# Patient Record
Sex: MALE | Race: WHITE | NOT HISPANIC OR LATINO
[De-identification: names, ages, dates, MRNs, and addresses within clinical notes are randomized per-mention and may not be internally consistent; named-entity substitution may affect disease eponyms.]

---

## 2017-06-02 ENCOUNTER — APPOINTMENT (OUTPATIENT)
Dept: GASTROENTEROLOGY | Facility: HOSPITAL | Age: 67
End: 2017-06-02

## 2017-06-02 ENCOUNTER — OUTPATIENT (OUTPATIENT)
Dept: OUTPATIENT SERVICES | Facility: HOSPITAL | Age: 67
LOS: 1 days | Discharge: HOME | End: 2017-06-02
Payer: MEDICARE

## 2017-06-02 ENCOUNTER — CHART COPY (OUTPATIENT)
Age: 67
End: 2017-06-02

## 2017-06-02 DIAGNOSIS — K31.89 OTHER DISEASES OF STOMACH AND DUODENUM: ICD-10-CM

## 2017-06-02 PROCEDURE — 43253 EGD US TRANSMURAL INJXN/MARK: CPT | Mod: 59,GC

## 2017-06-02 PROCEDURE — 43259 EGD US EXAM DUODENUM/JEJUNUM: CPT | Mod: GC

## 2017-06-05 ENCOUNTER — APPOINTMENT (OUTPATIENT)
Dept: SURGICAL ONCOLOGY | Facility: CLINIC | Age: 67
End: 2017-06-05

## 2017-06-05 VITALS
DIASTOLIC BLOOD PRESSURE: 82 MMHG | WEIGHT: 200 LBS | OXYGEN SATURATION: 99 % | HEIGHT: 66 IN | BODY MASS INDEX: 32.14 KG/M2 | SYSTOLIC BLOOD PRESSURE: 148 MMHG | HEART RATE: 117 BPM

## 2017-06-05 DIAGNOSIS — Z87.19 PERSONAL HISTORY OF OTHER DISEASES OF THE DIGESTIVE SYSTEM: ICD-10-CM

## 2017-06-05 DIAGNOSIS — Z86.79 PERSONAL HISTORY OF OTHER DISEASES OF THE CIRCULATORY SYSTEM: ICD-10-CM

## 2017-06-05 DIAGNOSIS — Z87.891 PERSONAL HISTORY OF NICOTINE DEPENDENCE: ICD-10-CM

## 2017-06-05 DIAGNOSIS — Z86.39 PERSONAL HISTORY OF OTHER ENDOCRINE, NUTRITIONAL AND METABOLIC DISEASE: ICD-10-CM

## 2017-06-05 DIAGNOSIS — Z87.09 PERSONAL HISTORY OF OTHER DISEASES OF THE RESPIRATORY SYSTEM: ICD-10-CM

## 2017-06-05 RX ORDER — WARFARIN SODIUM 5 MG/1
5 TABLET ORAL
Refills: 0 | Status: ACTIVE | COMMUNITY

## 2017-06-05 RX ORDER — REPAGLINIDE 1 MG/1
1 TABLET ORAL
Refills: 0 | Status: ACTIVE | COMMUNITY

## 2017-06-05 RX ORDER — METFORMIN ER 500 MG 500 MG/1
500 TABLET ORAL
Refills: 0 | Status: ACTIVE | COMMUNITY

## 2017-06-05 RX ORDER — PANCRELIPASE 120000; 24000; 76000 [USP'U]/1; [USP'U]/1; [USP'U]/1
24000-76000 CAPSULE, DELAYED RELEASE PELLETS ORAL
Refills: 0 | Status: ACTIVE | COMMUNITY

## 2017-06-05 RX ORDER — TIOTROPIUM BROMIDE 18 UG/1
18 CAPSULE ORAL; RESPIRATORY (INHALATION)
Refills: 0 | Status: ACTIVE | COMMUNITY

## 2017-06-05 RX ORDER — BUDESONIDE AND FORMOTEROL FUMARATE DIHYDRATE 160; 4.5 UG/1; UG/1
160-4.5 AEROSOL RESPIRATORY (INHALATION)
Refills: 0 | Status: ACTIVE | COMMUNITY

## 2017-06-05 RX ORDER — METOPROLOL SUCCINATE 100 MG/1
100 TABLET, EXTENDED RELEASE ORAL
Refills: 0 | Status: ACTIVE | COMMUNITY

## 2017-06-05 RX ORDER — BENAZEPRIL HYDROCHLORIDE 10 MG/1
10 TABLET, FILM COATED ORAL
Refills: 0 | Status: ACTIVE | COMMUNITY

## 2017-06-05 RX ORDER — DEXLANSOPRAZOLE 60 MG/1
60 CAPSULE, DELAYED RELEASE ORAL
Refills: 0 | Status: ACTIVE | COMMUNITY

## 2017-06-05 RX ORDER — SITAGLIPTIN 100 MG/1
100 TABLET, FILM COATED ORAL
Refills: 0 | Status: ACTIVE | COMMUNITY

## 2017-06-05 RX ORDER — ATORVASTATIN CALCIUM 20 MG/1
20 TABLET, FILM COATED ORAL
Refills: 0 | Status: ACTIVE | COMMUNITY

## 2017-06-05 RX ORDER — DILTIAZEM HYDROCHLORIDE 120 MG/1
120 TABLET ORAL
Refills: 0 | Status: ACTIVE | COMMUNITY

## 2017-06-05 RX ORDER — MEMANTINE HYDROCHLORIDE 10 MG/1
10 TABLET ORAL
Refills: 0 | Status: ACTIVE | COMMUNITY

## 2017-06-14 ENCOUNTER — OUTPATIENT (OUTPATIENT)
Dept: OUTPATIENT SERVICES | Facility: HOSPITAL | Age: 67
LOS: 1 days | Discharge: HOME | End: 2017-06-14

## 2017-06-14 DIAGNOSIS — C16.9 MALIGNANT NEOPLASM OF STOMACH, UNSPECIFIED: ICD-10-CM

## 2017-06-15 ENCOUNTER — APPOINTMENT (OUTPATIENT)
Dept: HEMATOLOGY ONCOLOGY | Facility: CLINIC | Age: 67
End: 2017-06-15

## 2017-06-15 VITALS
DIASTOLIC BLOOD PRESSURE: 71 MMHG | BODY MASS INDEX: 30.44 KG/M2 | RESPIRATION RATE: 16 BRPM | HEIGHT: 67.95 IN | HEART RATE: 92 BPM | OXYGEN SATURATION: 100 % | TEMPERATURE: 98.7 F | WEIGHT: 200.84 LBS | SYSTOLIC BLOOD PRESSURE: 106 MMHG

## 2017-06-21 ENCOUNTER — APPOINTMENT (OUTPATIENT)
Dept: INFUSION THERAPY | Facility: HOSPITAL | Age: 67
End: 2017-06-21

## 2017-06-28 ENCOUNTER — RESULT REVIEW (OUTPATIENT)
Age: 67
End: 2017-06-28

## 2017-06-28 ENCOUNTER — OUTPATIENT (OUTPATIENT)
Dept: OUTPATIENT SERVICES | Facility: HOSPITAL | Age: 67
LOS: 1 days | End: 2017-06-28
Payer: SELF-PAY

## 2017-06-28 ENCOUNTER — APPOINTMENT (OUTPATIENT)
Dept: INFUSION THERAPY | Facility: HOSPITAL | Age: 67
End: 2017-06-28

## 2017-06-28 DIAGNOSIS — K63.5 POLYP OF COLON: ICD-10-CM

## 2017-06-28 PROCEDURE — 88321 CONSLTJ&REPRT SLD PREP ELSWR: CPT

## 2017-06-30 ENCOUNTER — APPOINTMENT (OUTPATIENT)
Dept: INFUSION THERAPY | Facility: HOSPITAL | Age: 67
End: 2017-06-30

## 2017-06-30 LAB — SURGICAL PATHOLOGY STUDY: SIGNIFICANT CHANGE UP

## 2017-07-01 ENCOUNTER — EMERGENCY (EMERGENCY)
Facility: HOSPITAL | Age: 67
LOS: 1 days | Discharge: HOME | End: 2017-07-01
Attending: EMERGENCY MEDICINE | Admitting: EMERGENCY MEDICINE
Payer: MEDICARE

## 2017-07-01 VITALS
TEMPERATURE: 98 F | SYSTOLIC BLOOD PRESSURE: 104 MMHG | RESPIRATION RATE: 20 BRPM | DIASTOLIC BLOOD PRESSURE: 73 MMHG | HEART RATE: 114 BPM | OXYGEN SATURATION: 100 %

## 2017-07-01 VITALS
SYSTOLIC BLOOD PRESSURE: 101 MMHG | DIASTOLIC BLOOD PRESSURE: 55 MMHG | HEART RATE: 82 BPM | RESPIRATION RATE: 20 BRPM | TEMPERATURE: 98 F | OXYGEN SATURATION: 100 %

## 2017-07-01 LAB
ALBUMIN SERPL ELPH-MCNC: 3.7 G/DL — SIGNIFICANT CHANGE UP (ref 3.3–5)
ALP SERPL-CCNC: 107 U/L — SIGNIFICANT CHANGE UP (ref 40–120)
ALT FLD-CCNC: 16 U/L — SIGNIFICANT CHANGE UP (ref 4–41)
APPEARANCE UR: CLEAR — SIGNIFICANT CHANGE UP
AST SERPL-CCNC: 19 U/L — SIGNIFICANT CHANGE UP (ref 4–40)
BASE EXCESS BLDV CALC-SCNC: 0.9 MMOL/L — SIGNIFICANT CHANGE UP
BASOPHILS # BLD AUTO: 0.05 K/UL — SIGNIFICANT CHANGE UP (ref 0–0.2)
BASOPHILS NFR BLD AUTO: 0.4 % — SIGNIFICANT CHANGE UP (ref 0–2)
BASOPHILS NFR SPEC: 0 % — SIGNIFICANT CHANGE UP (ref 0–2)
BILIRUB SERPL-MCNC: 0.2 MG/DL — SIGNIFICANT CHANGE UP (ref 0.2–1.2)
BILIRUB UR-MCNC: NEGATIVE — SIGNIFICANT CHANGE UP
BLOOD GAS VENOUS - CREATININE: 1.2 MG/DL — SIGNIFICANT CHANGE UP (ref 0.5–1.3)
BLOOD UR QL VISUAL: NEGATIVE — SIGNIFICANT CHANGE UP
BUN SERPL-MCNC: 18 MG/DL — SIGNIFICANT CHANGE UP (ref 7–23)
CALCIUM SERPL-MCNC: 9 MG/DL — SIGNIFICANT CHANGE UP (ref 8.4–10.5)
CHLORIDE BLDV-SCNC: 108 MMOL/L — SIGNIFICANT CHANGE UP (ref 96–108)
CHLORIDE SERPL-SCNC: 103 MMOL/L — SIGNIFICANT CHANGE UP (ref 98–107)
CO2 SERPL-SCNC: 24 MMOL/L — SIGNIFICANT CHANGE UP (ref 22–31)
COLOR SPEC: SIGNIFICANT CHANGE UP
CREAT SERPL-MCNC: 1.13 MG/DL — SIGNIFICANT CHANGE UP (ref 0.5–1.3)
EOSINOPHIL # BLD AUTO: 0 K/UL — SIGNIFICANT CHANGE UP (ref 0–0.5)
EOSINOPHIL NFR BLD AUTO: 0 % — SIGNIFICANT CHANGE UP (ref 0–6)
EOSINOPHIL NFR FLD: 0 % — SIGNIFICANT CHANGE UP (ref 0–6)
GAS PNL BLDV: 136 MMOL/L — SIGNIFICANT CHANGE UP (ref 136–146)
GLUCOSE BLDV-MCNC: 118 — HIGH (ref 70–99)
GLUCOSE SERPL-MCNC: 122 MG/DL — HIGH (ref 70–99)
GLUCOSE UR-MCNC: NEGATIVE — SIGNIFICANT CHANGE UP
HCO3 BLDV-SCNC: 24 MMOL/L — SIGNIFICANT CHANGE UP (ref 20–27)
HCT VFR BLD CALC: 34 % — LOW (ref 39–50)
HCT VFR BLDV CALC: 33.4 % — LOW (ref 39–51)
HGB BLD-MCNC: 10.6 G/DL — LOW (ref 13–17)
HGB BLDV-MCNC: 10.8 G/DL — LOW (ref 13–17)
IMM GRANULOCYTES # BLD AUTO: 0.66 # — SIGNIFICANT CHANGE UP
IMM GRANULOCYTES NFR BLD AUTO: 5 % — HIGH (ref 0–1.5)
KETONES UR-MCNC: NEGATIVE — SIGNIFICANT CHANGE UP
LACTATE BLDV-MCNC: 2.7 MMOL/L — HIGH (ref 0.5–2)
LEUKOCYTE ESTERASE UR-ACNC: NEGATIVE — SIGNIFICANT CHANGE UP
LIDOCAIN IGE QN: 34.7 U/L — SIGNIFICANT CHANGE UP (ref 7–60)
LYMPHOCYTES # BLD AUTO: 0.65 K/UL — LOW (ref 1–3.3)
LYMPHOCYTES # BLD AUTO: 4.9 % — LOW (ref 13–44)
LYMPHOCYTES NFR SPEC AUTO: 3.5 % — LOW (ref 13–44)
MAGNESIUM SERPL-MCNC: 1.8 MG/DL — SIGNIFICANT CHANGE UP (ref 1.6–2.6)
MCHC RBC-ENTMCNC: 26.4 PG — LOW (ref 27–34)
MCHC RBC-ENTMCNC: 31.2 % — LOW (ref 32–36)
MCV RBC AUTO: 84.8 FL — SIGNIFICANT CHANGE UP (ref 80–100)
MONOCYTES # BLD AUTO: 0.66 K/UL — SIGNIFICANT CHANGE UP (ref 0–0.9)
MONOCYTES NFR BLD AUTO: 5 % — SIGNIFICANT CHANGE UP (ref 2–14)
MONOCYTES NFR BLD: 0.9 % — LOW (ref 2–9)
MUCOUS THREADS # UR AUTO: SIGNIFICANT CHANGE UP
MYELOCYTES NFR BLD: 1.7 % — HIGH (ref 0–0)
NEUTROPHIL AB SER-ACNC: 93.9 % — HIGH (ref 43–77)
NEUTROPHILS # BLD AUTO: 11.25 K/UL — HIGH (ref 1.8–7.4)
NEUTROPHILS NFR BLD AUTO: 84.7 % — HIGH (ref 43–77)
NITRITE UR-MCNC: NEGATIVE — SIGNIFICANT CHANGE UP
NRBC # FLD: 0 — SIGNIFICANT CHANGE UP
OVALOCYTES BLD QL SMEAR: SLIGHT — SIGNIFICANT CHANGE UP
PCO2 BLDV: 48 MMHG — SIGNIFICANT CHANGE UP (ref 41–51)
PH BLDV: 7.35 PH — SIGNIFICANT CHANGE UP (ref 7.32–7.43)
PH UR: 5.5 — SIGNIFICANT CHANGE UP (ref 4.6–8)
PHOSPHATE SERPL-MCNC: 3.7 MG/DL — SIGNIFICANT CHANGE UP (ref 2.5–4.5)
PLATELET # BLD AUTO: 165 K/UL — SIGNIFICANT CHANGE UP (ref 150–400)
PLATELET COUNT - ESTIMATE: NORMAL — SIGNIFICANT CHANGE UP
PMV BLD: 11.7 FL — SIGNIFICANT CHANGE UP (ref 7–13)
PO2 BLDV: 33 MMHG — LOW (ref 35–40)
POIKILOCYTOSIS BLD QL AUTO: SLIGHT — SIGNIFICANT CHANGE UP
POLYCHROMASIA BLD QL SMEAR: SLIGHT — SIGNIFICANT CHANGE UP
POTASSIUM BLDV-SCNC: 4.9 MMOL/L — HIGH (ref 3.4–4.5)
POTASSIUM SERPL-MCNC: 5.2 MMOL/L — SIGNIFICANT CHANGE UP (ref 3.5–5.3)
POTASSIUM SERPL-SCNC: 5.2 MMOL/L — SIGNIFICANT CHANGE UP (ref 3.5–5.3)
PROT SERPL-MCNC: 6.1 G/DL — SIGNIFICANT CHANGE UP (ref 6–8.3)
PROT UR-MCNC: NEGATIVE — SIGNIFICANT CHANGE UP
RBC # BLD: 4.01 M/UL — LOW (ref 4.2–5.8)
RBC # FLD: 17.7 % — HIGH (ref 10.3–14.5)
RBC CASTS # UR COMP ASSIST: SIGNIFICANT CHANGE UP (ref 0–?)
REVIEW TO FOLLOW: YES — SIGNIFICANT CHANGE UP
SAO2 % BLDV: 53.5 % — LOW (ref 60–85)
SODIUM SERPL-SCNC: 140 MMOL/L — SIGNIFICANT CHANGE UP (ref 135–145)
SP GR SPEC: 1.01 — SIGNIFICANT CHANGE UP (ref 1–1.03)
SQUAMOUS # UR AUTO: SIGNIFICANT CHANGE UP
UROBILINOGEN FLD QL: NORMAL E.U. — SIGNIFICANT CHANGE UP (ref 0.1–0.2)
WBC # BLD: 13.27 K/UL — HIGH (ref 3.8–10.5)
WBC # FLD AUTO: 13.27 K/UL — HIGH (ref 3.8–10.5)
WBC UR QL: SIGNIFICANT CHANGE UP (ref 0–?)

## 2017-07-01 PROCEDURE — 99285 EMERGENCY DEPT VISIT HI MDM: CPT | Mod: 25

## 2017-07-01 PROCEDURE — 74177 CT ABD & PELVIS W/CONTRAST: CPT | Mod: 26

## 2017-07-01 PROCEDURE — 93010 ELECTROCARDIOGRAM REPORT: CPT

## 2017-07-01 RX ORDER — ONDANSETRON 8 MG/1
4 TABLET, FILM COATED ORAL ONCE
Qty: 0 | Refills: 0 | Status: COMPLETED | OUTPATIENT
Start: 2017-07-01 | End: 2017-07-01

## 2017-07-01 RX ORDER — SODIUM CHLORIDE 9 MG/ML
2000 INJECTION INTRAMUSCULAR; INTRAVENOUS; SUBCUTANEOUS ONCE
Qty: 0 | Refills: 0 | Status: COMPLETED | OUTPATIENT
Start: 2017-07-01 | End: 2017-07-01

## 2017-07-01 RX ORDER — ONDANSETRON 8 MG/1
1 TABLET, FILM COATED ORAL
Qty: 15 | Refills: 0 | OUTPATIENT
Start: 2017-07-01 | End: 2017-07-06

## 2017-07-01 RX ADMIN — SODIUM CHLORIDE 2000 MILLILITER(S): 9 INJECTION INTRAMUSCULAR; INTRAVENOUS; SUBCUTANEOUS at 14:46

## 2017-07-01 RX ADMIN — ONDANSETRON 4 MILLIGRAM(S): 8 TABLET, FILM COATED ORAL at 14:46

## 2017-07-01 NOTE — ED PROVIDER NOTE - MEDICAL DECISION MAKING DETAILS
pt with hx gastric CA with vomiting, concern for obstruction, will give antiemetics + CTAP, IVF, reassess.

## 2017-07-01 NOTE — ED ADULT NURSE NOTE - OBJECTIVE STATEMENT
Pt presents to main ED room 20 with reports of diarrhea x1 week and vomiting since today, pt denies blood in stool or vomit. Pt primarily Ghanaian speaking, refusing  services, family at bedside to translate. Pt with stomach CA diagnosed last month per family at bedside, last IV chemo treatment was 2 weeks ago. R chest wall port visualized, dressing intact, port not accessed in main ED, 20g PIV placed in R AC. Pt received awake, A&Ox4, pt denies lightheadedness, dizziness, reporting slight headache, pt states 'because I haven't eaten anything all day'. Respirations appear even, unlabored, pt denies SOB or chest pain. Pt reporting nausea, no active vomiting observed at this time. Skin warm, dry, intact, pale for race. Pt reporting he is ambulatory at baseline, denies recent falls. VS documented per flow. Labs drawn and sent per orders, pt medicated per orders, safety maintained, family remains at bedside, will continue to monitor.

## 2017-07-01 NOTE — ED PROVIDER NOTE - PROGRESS NOTE DETAILS
tolerating PO, would like to f/u outpt with his oncologist. Patient informed of ED visit findings, understands plan.  Patient instructed to follow up with their primary care physician in 2-3 days and return for new, worsened, or persistent symptoms.

## 2017-07-01 NOTE — ED PROVIDER NOTE - OBJECTIVE STATEMENT
68yo m pmh HTN, Gastric CA (on chemo ) p/w vomiting. Several episodes non bloody emesis today.  + nausea. Last chemo two weeks ago with diarrhea following chemo.  No chest pain, abdominal pain, urinary symptoms. 66yo m pmh HTN, Gastric CA (on chemo ) p/w vomiting. Several episodes non bloody emesis today.  + nausea. Last chemo two weeks ago with diarrhea following chemo.  No chest pain, abdominal pain, urinary symptoms.  Attending - Agree with above.  I evaluated patient myself.  Mozambican speaking.  68 y/o M with daughter and niece at bedside who are assisting with history and translation.  Offered  phone but prefers family.  Noted hx DM, HTN, Afib s/p Watchman procedure now off coumadin.  Dx'ed with gastric adenoca on 5/21.  First dose chemo (unknown meds) at [facility] in [place] office, on 6/20.  right upper chest portacath in place.  Was doing well until 1 week ago when developed diarrhea that has persisted.  Today developed nausea and vomiting.  Denies abd pain.  No fever.  No cough, shortness of breathe or chest pain.  Spoke to Oncologist who referred to ED for eval.

## 2017-07-01 NOTE — ED ADULT TRIAGE NOTE - CHIEF COMPLAINT QUOTE
hx of stomach CA, on chemo last of which was 2 weeks ago. patient has been having diarrhea x 1 week, vomting today. sent by PMD to r/o SBO.

## 2017-07-01 NOTE — ED PROVIDER NOTE - PHYSICAL EXAMINATION
ATTENDING PHYSICAL EXAM DR. Reno ***GEN - Appears uncomfortable; A+O x3 ***HEAD - NC/AT ***EYES/NOSE - PERRL, EOMI, mucous membranes moist, no discharge ***THROAT: Oral cavity and pharynx normal. No inflammation, swelling, exudate, or lesions.  ***NECK: Neck supple, non-tender without lymphadenopathy, no masses, no JVD.   ***PULMONARY - CTA b/l, symmetric breath sounds. ***CARDIAC -irreg irreg, 2/6 sys murmur, noted right upper chest catheter SQ  ***ABDOMEN - +BS, mild distention with diffuse tenderness to palpation, soft, no guarding, no rebound, no masses   ***BACK - no CVA tenderness, Normal  spine ***EXTREMITIES - symmetric pulses, 2+ dp, capillary refill < 2 seconds, no clubbing, no cyanosis, no edema ***SKIN - no rash or bruising   ***NEUROLOGIC - alert, CN 2-12 intact

## 2017-07-03 LAB
BACTERIA UR CULT: SIGNIFICANT CHANGE UP
SPECIMEN SOURCE: SIGNIFICANT CHANGE UP

## 2017-07-23 ENCOUNTER — INPATIENT (INPATIENT)
Facility: HOSPITAL | Age: 67
LOS: 2 days | Discharge: HOME | End: 2017-07-26
Attending: HOSPITALIST | Admitting: HOSPITALIST
Payer: MEDICARE

## 2017-07-23 VITALS
OXYGEN SATURATION: 99 % | TEMPERATURE: 98 F | RESPIRATION RATE: 18 BRPM | DIASTOLIC BLOOD PRESSURE: 68 MMHG | SYSTOLIC BLOOD PRESSURE: 115 MMHG | HEART RATE: 140 BPM

## 2017-07-23 DIAGNOSIS — J18.9 PNEUMONIA, UNSPECIFIED ORGANISM: ICD-10-CM

## 2017-07-23 LAB
ALBUMIN SERPL ELPH-MCNC: 3.4 G/DL — SIGNIFICANT CHANGE UP (ref 3.3–5)
ALP SERPL-CCNC: 134 U/L — HIGH (ref 40–120)
ALT FLD-CCNC: 37 U/L — SIGNIFICANT CHANGE UP (ref 4–41)
ANISOCYTOSIS BLD QL: SLIGHT — SIGNIFICANT CHANGE UP
APPEARANCE UR: CLEAR — SIGNIFICANT CHANGE UP
APTT BLD: 27.3 SEC — LOW (ref 27.5–37.4)
AST SERPL-CCNC: 24 U/L — SIGNIFICANT CHANGE UP (ref 4–40)
B PERT DNA SPEC QL NAA+PROBE: SIGNIFICANT CHANGE UP
BASE EXCESS BLDV CALC-SCNC: 6.1 MMOL/L — SIGNIFICANT CHANGE UP
BASOPHILS # BLD AUTO: 0.04 K/UL — SIGNIFICANT CHANGE UP (ref 0–0.2)
BASOPHILS NFR BLD AUTO: 0.2 % — SIGNIFICANT CHANGE UP (ref 0–2)
BASOPHILS NFR SPEC: 0 % — SIGNIFICANT CHANGE UP (ref 0–2)
BILIRUB SERPL-MCNC: 0.3 MG/DL — SIGNIFICANT CHANGE UP (ref 0.2–1.2)
BILIRUB UR-MCNC: NEGATIVE — SIGNIFICANT CHANGE UP
BLOOD GAS VENOUS - CREATININE: 1.04 MG/DL — SIGNIFICANT CHANGE UP (ref 0.5–1.3)
BLOOD UR QL VISUAL: NEGATIVE — SIGNIFICANT CHANGE UP
BUN SERPL-MCNC: 22 MG/DL — SIGNIFICANT CHANGE UP (ref 7–23)
C PNEUM DNA SPEC QL NAA+PROBE: NOT DETECTED — SIGNIFICANT CHANGE UP
CALCIUM SERPL-MCNC: 8.2 MG/DL — LOW (ref 8.4–10.5)
CHLORIDE BLDV-SCNC: 102 MMOL/L — SIGNIFICANT CHANGE UP (ref 96–108)
CHLORIDE SERPL-SCNC: 101 MMOL/L — SIGNIFICANT CHANGE UP (ref 98–107)
CK MB BLD-MCNC: 1.71 NG/ML — SIGNIFICANT CHANGE UP (ref 1–6.6)
CK MB BLD-MCNC: SIGNIFICANT CHANGE UP (ref 0–2.5)
CK SERPL-CCNC: 58 U/L — SIGNIFICANT CHANGE UP (ref 30–200)
CO2 SERPL-SCNC: 28 MMOL/L — SIGNIFICANT CHANGE UP (ref 22–31)
COLOR SPEC: SIGNIFICANT CHANGE UP
CREAT SERPL-MCNC: 1.01 MG/DL — SIGNIFICANT CHANGE UP (ref 0.5–1.3)
EOSINOPHIL # BLD AUTO: 0.05 K/UL — SIGNIFICANT CHANGE UP (ref 0–0.5)
EOSINOPHIL NFR BLD AUTO: 0.2 % — SIGNIFICANT CHANGE UP (ref 0–6)
EOSINOPHIL NFR FLD: 0 % — SIGNIFICANT CHANGE UP (ref 0–6)
FLUAV H1 2009 PAND RNA SPEC QL NAA+PROBE: NOT DETECTED — SIGNIFICANT CHANGE UP
FLUAV H1 RNA SPEC QL NAA+PROBE: NOT DETECTED — SIGNIFICANT CHANGE UP
FLUAV H3 RNA SPEC QL NAA+PROBE: NOT DETECTED — SIGNIFICANT CHANGE UP
FLUAV SUBTYP SPEC NAA+PROBE: SIGNIFICANT CHANGE UP
FLUBV RNA SPEC QL NAA+PROBE: NOT DETECTED — SIGNIFICANT CHANGE UP
GAS PNL BLDV: 135 MMOL/L — LOW (ref 136–146)
GLUCOSE BLDV-MCNC: 127 — HIGH (ref 70–99)
GLUCOSE SERPL-MCNC: 125 MG/DL — HIGH (ref 70–99)
GLUCOSE UR-MCNC: 300 — SIGNIFICANT CHANGE UP
HADV DNA SPEC QL NAA+PROBE: NOT DETECTED — SIGNIFICANT CHANGE UP
HCO3 BLDV-SCNC: 29 MMOL/L — HIGH (ref 20–27)
HCOV 229E RNA SPEC QL NAA+PROBE: NOT DETECTED — SIGNIFICANT CHANGE UP
HCOV HKU1 RNA SPEC QL NAA+PROBE: NOT DETECTED — SIGNIFICANT CHANGE UP
HCOV NL63 RNA SPEC QL NAA+PROBE: NOT DETECTED — SIGNIFICANT CHANGE UP
HCOV OC43 RNA SPEC QL NAA+PROBE: NOT DETECTED — SIGNIFICANT CHANGE UP
HCT VFR BLD CALC: 32.6 % — LOW (ref 39–50)
HCT VFR BLDV CALC: 33.2 % — LOW (ref 39–51)
HGB BLD-MCNC: 10.5 G/DL — LOW (ref 13–17)
HGB BLDV-MCNC: 10.8 G/DL — LOW (ref 13–17)
HMPV RNA SPEC QL NAA+PROBE: NOT DETECTED — SIGNIFICANT CHANGE UP
HPIV1 RNA SPEC QL NAA+PROBE: NOT DETECTED — SIGNIFICANT CHANGE UP
HPIV2 RNA SPEC QL NAA+PROBE: NOT DETECTED — SIGNIFICANT CHANGE UP
HPIV3 RNA SPEC QL NAA+PROBE: NOT DETECTED — SIGNIFICANT CHANGE UP
HPIV4 RNA SPEC QL NAA+PROBE: NOT DETECTED — SIGNIFICANT CHANGE UP
IMM GRANULOCYTES # BLD AUTO: 7.17 # — SIGNIFICANT CHANGE UP
IMM GRANULOCYTES NFR BLD AUTO: 28.9 % — HIGH (ref 0–1.5)
INR BLD: 1.31 — HIGH (ref 0.88–1.17)
KETONES UR-MCNC: NEGATIVE — SIGNIFICANT CHANGE UP
LACTATE BLDV-MCNC: 1.6 MMOL/L — SIGNIFICANT CHANGE UP (ref 0.5–2)
LEUKOCYTE ESTERASE UR-ACNC: NEGATIVE — SIGNIFICANT CHANGE UP
LG PLATELETS BLD QL AUTO: SLIGHT — SIGNIFICANT CHANGE UP
LYMPHOCYTES # BLD AUTO: 0.8 K/UL — LOW (ref 1–3.3)
LYMPHOCYTES # BLD AUTO: 3.2 % — LOW (ref 13–44)
LYMPHOCYTES NFR SPEC AUTO: 1 % — LOW (ref 13–44)
M PNEUMO DNA SPEC QL NAA+PROBE: NOT DETECTED — SIGNIFICANT CHANGE UP
MANUAL SMEAR VERIFICATION: SIGNIFICANT CHANGE UP
MCHC RBC-ENTMCNC: 26.9 PG — LOW (ref 27–34)
MCHC RBC-ENTMCNC: 32.2 % — SIGNIFICANT CHANGE UP (ref 32–36)
MCV RBC AUTO: 83.6 FL — SIGNIFICANT CHANGE UP (ref 80–100)
MICROCYTES BLD QL: SLIGHT — SIGNIFICANT CHANGE UP
MONOCYTES # BLD AUTO: 0.13 K/UL — SIGNIFICANT CHANGE UP (ref 0–0.9)
MONOCYTES NFR BLD AUTO: 0.5 % — LOW (ref 2–14)
MONOCYTES NFR BLD: 0 % — LOW (ref 2–9)
MUCOUS THREADS # UR AUTO: SIGNIFICANT CHANGE UP
NEUTROPHIL AB SER-ACNC: 97 % — HIGH (ref 43–77)
NEUTROPHILS # BLD AUTO: 16.6 K/UL — HIGH (ref 1.8–7.4)
NEUTROPHILS NFR BLD AUTO: 67 % — SIGNIFICANT CHANGE UP (ref 43–77)
NEUTS BAND # BLD: 2 % — SIGNIFICANT CHANGE UP (ref 0–6)
NITRITE UR-MCNC: NEGATIVE — SIGNIFICANT CHANGE UP
NRBC # FLD: 0.02 — SIGNIFICANT CHANGE UP
PCO2 BLDV: 50 MMHG — SIGNIFICANT CHANGE UP (ref 41–51)
PH BLDV: 7.4 PH — SIGNIFICANT CHANGE UP (ref 7.32–7.43)
PH UR: 7.5 — SIGNIFICANT CHANGE UP (ref 4.6–8)
PLATELET # BLD AUTO: 99 K/UL — LOW (ref 150–400)
PLATELET COUNT - ESTIMATE: SIGNIFICANT CHANGE UP
PMV BLD: SIGNIFICANT CHANGE UP FL (ref 7–13)
PO2 BLDV: 32 MMHG — LOW (ref 35–40)
POTASSIUM BLDV-SCNC: 3.6 MMOL/L — SIGNIFICANT CHANGE UP (ref 3.4–4.5)
POTASSIUM SERPL-MCNC: 3.8 MMOL/L — SIGNIFICANT CHANGE UP (ref 3.5–5.3)
POTASSIUM SERPL-SCNC: 3.8 MMOL/L — SIGNIFICANT CHANGE UP (ref 3.5–5.3)
PROT SERPL-MCNC: 5.5 G/DL — LOW (ref 6–8.3)
PROT UR-MCNC: 20 — SIGNIFICANT CHANGE UP
PROTHROM AB SERPL-ACNC: 14.7 SEC — HIGH (ref 9.8–13.1)
RBC # BLD: 3.9 M/UL — LOW (ref 4.2–5.8)
RBC # FLD: 19.5 % — HIGH (ref 10.3–14.5)
RBC CASTS # UR COMP ASSIST: SIGNIFICANT CHANGE UP (ref 0–?)
RSV RNA SPEC QL NAA+PROBE: NOT DETECTED — SIGNIFICANT CHANGE UP
RV+EV RNA SPEC QL NAA+PROBE: POSITIVE — HIGH
SAO2 % BLDV: 53.2 % — LOW (ref 60–85)
SODIUM SERPL-SCNC: 139 MMOL/L — SIGNIFICANT CHANGE UP (ref 135–145)
SP GR SPEC: 1.01 — SIGNIFICANT CHANGE UP (ref 1–1.03)
TROPONIN T SERPL-MCNC: < 0.06 NG/ML — SIGNIFICANT CHANGE UP (ref 0–0.06)
UROBILINOGEN FLD QL: NORMAL E.U. — SIGNIFICANT CHANGE UP (ref 0.1–0.2)
WBC # BLD: 24.79 K/UL — HIGH (ref 3.8–10.5)
WBC # FLD AUTO: 24.79 K/UL — HIGH (ref 3.8–10.5)
WBC UR QL: SIGNIFICANT CHANGE UP (ref 0–?)

## 2017-07-23 PROCEDURE — 71020: CPT | Mod: 26

## 2017-07-23 PROCEDURE — 71275 CT ANGIOGRAPHY CHEST: CPT | Mod: 26

## 2017-07-23 RX ORDER — SODIUM CHLORIDE 9 MG/ML
500 INJECTION INTRAMUSCULAR; INTRAVENOUS; SUBCUTANEOUS ONCE
Qty: 0 | Refills: 0 | Status: COMPLETED | OUTPATIENT
Start: 2017-07-23 | End: 2017-07-23

## 2017-07-23 RX ORDER — ACETAMINOPHEN 500 MG
650 TABLET ORAL ONCE
Qty: 0 | Refills: 0 | Status: COMPLETED | OUTPATIENT
Start: 2017-07-23 | End: 2017-07-23

## 2017-07-23 RX ORDER — SERTRALINE 25 MG/1
25 TABLET, FILM COATED ORAL ONCE
Qty: 0 | Refills: 0 | Status: COMPLETED | OUTPATIENT
Start: 2017-07-23 | End: 2017-07-23

## 2017-07-23 RX ORDER — CEFEPIME 1 G/1
1000 INJECTION, POWDER, FOR SOLUTION INTRAMUSCULAR; INTRAVENOUS ONCE
Qty: 0 | Refills: 0 | Status: COMPLETED | OUTPATIENT
Start: 2017-07-23 | End: 2017-07-23

## 2017-07-23 RX ORDER — CIPROFLOXACIN LACTATE 400MG/40ML
400 VIAL (ML) INTRAVENOUS ONCE
Qty: 0 | Refills: 0 | Status: COMPLETED | OUTPATIENT
Start: 2017-07-23 | End: 2017-07-23

## 2017-07-23 RX ORDER — SOTALOL HCL 120 MG
80 TABLET ORAL ONCE
Qty: 0 | Refills: 0 | Status: COMPLETED | OUTPATIENT
Start: 2017-07-23 | End: 2017-07-23

## 2017-07-23 RX ORDER — VANCOMYCIN HCL 1 G
1000 VIAL (EA) INTRAVENOUS ONCE
Qty: 0 | Refills: 0 | Status: COMPLETED | OUTPATIENT
Start: 2017-07-23 | End: 2017-07-23

## 2017-07-23 RX ADMIN — CEFEPIME 100 MILLIGRAM(S): 1 INJECTION, POWDER, FOR SOLUTION INTRAMUSCULAR; INTRAVENOUS at 17:47

## 2017-07-23 RX ADMIN — Medication 650 MILLIGRAM(S): at 17:20

## 2017-07-23 RX ADMIN — SODIUM CHLORIDE 500 MILLILITER(S): 9 INJECTION INTRAMUSCULAR; INTRAVENOUS; SUBCUTANEOUS at 21:12

## 2017-07-23 RX ADMIN — Medication 200 MILLIGRAM(S): at 18:24

## 2017-07-23 RX ADMIN — SERTRALINE 25 MILLIGRAM(S): 25 TABLET, FILM COATED ORAL at 23:23

## 2017-07-23 RX ADMIN — Medication 250 MILLIGRAM(S): at 19:30

## 2017-07-23 RX ADMIN — SODIUM CHLORIDE 500 MILLILITER(S): 9 INJECTION INTRAMUSCULAR; INTRAVENOUS; SUBCUTANEOUS at 17:51

## 2017-07-23 RX ADMIN — SODIUM CHLORIDE 500 MILLILITER(S): 9 INJECTION INTRAMUSCULAR; INTRAVENOUS; SUBCUTANEOUS at 16:49

## 2017-07-23 RX ADMIN — Medication 80 MILLIGRAM(S): at 21:12

## 2017-07-23 NOTE — ED PROVIDER NOTE - OBJECTIVE STATEMENT
67M PMH gastric adenocarcinoma (just finished chemo 2 days ago and had port removed), HTN, DM, p/w cough, sharp chest pain a/w cough that radiates to back, nasal congestion x 2 days. Also notes diarrhea, but states this is normal for him after chemo. No abd pain, NV. No fevers. No SOB or GARRISON.     Pt speaks some English, primarily Panamanian speaking, prefers daughter and niece at bedside to translate.

## 2017-07-23 NOTE — ED ADULT NURSE NOTE - CHIEF COMPLAINT QUOTE
pt c/o chest pain radiating to back, with coughing, congestion, runny nose/bleeding since Friday after 3rd chemotherapy treatment.  pt reports taking aspirin. denies any other symptoms.  PMHX: adenocarcinoma with active chemo, dm, afib

## 2017-07-23 NOTE — ED PROVIDER NOTE - PROGRESS NOTE DETAILS
Delay in obtaining CT result; negative for PE, shows b/l patchy opacities consistent with pneumonia. Already given HCAP coverage. Accepted for admission to hospitalist. MAR textpage sent. Pt and family aware and amenable to plan. Admitted on tele for persistent tachycardia with Afib.

## 2017-07-23 NOTE — ED PROVIDER NOTE - ATTENDING CONTRIBUTION TO CARE
Attending Statement: I have personally seen and examined this patient. I have fully participated in the care of this patient. I have reviewed all pertinent clinical information, including history physical exam, plan and the Resident's note and agree except as noted   66yo M hx of gastric adenocarcinoma completed last round of chemo 2 days ago, HTN, DM hx of DVT off coumadin since June 13, 2017 from home w family co chest pain, productive cough x 2 days. Chest pain sharp radiate to the back. no sob. +productive cough, blood streak in sputum.  no nausea/vomit/abdominal pain. no leg swelling or calf tenderness. loose BM "normally happens after chemo"  Vital signs noted. Temp 100.2 rectal tachycardic, irregular, coarse BS bl. soft nt abdomen. +port ACW right  no pedal edema. no calf tenderness. normal pulses bilateral feet.   plan labs, ekg, ua, ct angio, IVF, IV abx, admission

## 2017-07-23 NOTE — ED ADULT NURSE NOTE - OBJECTIVE STATEMENT
pt presents to ED R#2 Aox4, in NAD, c/o midsternal chest pressure radiating to back with cough, runny nose, and intermittent epistaxis since Friday. States chest pressure worse when coughing. Denies fevers/ chills/ SOB/ urinary symptoms/ other acute complaints. Tachycardic; compliant with Afib meds, as per family pt is baseline tachy -140 after chemo treatments (last treatment Friday.) Denies palpitations/ dizziness. IV inserted, BW collected and sent to lab. RVP sent; droplet precautions in place. Awaiting test results. WIll continue to monitor.

## 2017-07-23 NOTE — ED PROVIDER NOTE - MEDICAL DECISION MAKING DETAILS
67M with chest pain, cough, infectious etiology vs PE as pt has hx of dvt and is not on any anticoagulation, recent chemo. Eval for neutropenia. Gentle hydration given tachycardia, eval for fever prior to treating tachycardia. Empiric abx if febrile, CTA for r/o PE. 67M with chest pain, cough, infectious etiology vs PE as pt has hx of dvt and is not on any anticoagulation, recent chemo. Eval for neutropenia. Gentle hydration given tachycardia, eval for fever prior to treating tachycardia. Empiric abx if febrile, CTA for r/o PE. CTA showing b/l pneumonia, elevated WBC. Admit.

## 2017-07-24 DIAGNOSIS — K21.9 GASTRO-ESOPHAGEAL REFLUX DISEASE WITHOUT ESOPHAGITIS: ICD-10-CM

## 2017-07-24 DIAGNOSIS — Z29.9 ENCOUNTER FOR PROPHYLACTIC MEASURES, UNSPECIFIED: ICD-10-CM

## 2017-07-24 DIAGNOSIS — B19.10 UNSPECIFIED VIRAL HEPATITIS B WITHOUT HEPATIC COMA: ICD-10-CM

## 2017-07-24 DIAGNOSIS — J18.9 PNEUMONIA, UNSPECIFIED ORGANISM: ICD-10-CM

## 2017-07-24 DIAGNOSIS — D69.6 THROMBOCYTOPENIA, UNSPECIFIED: ICD-10-CM

## 2017-07-24 DIAGNOSIS — C16.9 MALIGNANT NEOPLASM OF STOMACH, UNSPECIFIED: ICD-10-CM

## 2017-07-24 DIAGNOSIS — E11.9 TYPE 2 DIABETES MELLITUS WITHOUT COMPLICATIONS: ICD-10-CM

## 2017-07-24 DIAGNOSIS — J44.9 CHRONIC OBSTRUCTIVE PULMONARY DISEASE, UNSPECIFIED: ICD-10-CM

## 2017-07-24 DIAGNOSIS — I48.91 UNSPECIFIED ATRIAL FIBRILLATION: ICD-10-CM

## 2017-07-24 LAB
APTT BLD: 27.5 SEC — SIGNIFICANT CHANGE UP (ref 27.5–37.4)
BASOPHILS # BLD AUTO: 0.04 K/UL — SIGNIFICANT CHANGE UP (ref 0–0.2)
BASOPHILS NFR BLD AUTO: 0.3 % — SIGNIFICANT CHANGE UP (ref 0–2)
BUN SERPL-MCNC: 18 MG/DL — SIGNIFICANT CHANGE UP (ref 7–23)
CALCIUM SERPL-MCNC: 8.4 MG/DL — SIGNIFICANT CHANGE UP (ref 8.4–10.5)
CHLORIDE SERPL-SCNC: 105 MMOL/L — SIGNIFICANT CHANGE UP (ref 98–107)
CK MB BLD-MCNC: 1.82 NG/ML — SIGNIFICANT CHANGE UP (ref 1–6.6)
CK MB BLD-MCNC: 1.86 NG/ML — SIGNIFICANT CHANGE UP (ref 1–6.6)
CK MB BLD-MCNC: SIGNIFICANT CHANGE UP (ref 0–2.5)
CK SERPL-CCNC: 45 U/L — SIGNIFICANT CHANGE UP (ref 30–200)
CK SERPL-CCNC: 53 U/L — SIGNIFICANT CHANGE UP (ref 30–200)
CO2 SERPL-SCNC: 27 MMOL/L — SIGNIFICANT CHANGE UP (ref 22–31)
CREAT SERPL-MCNC: 0.89 MG/DL — SIGNIFICANT CHANGE UP (ref 0.5–1.3)
EOSINOPHIL # BLD AUTO: 0.02 K/UL — SIGNIFICANT CHANGE UP (ref 0–0.5)
EOSINOPHIL NFR BLD AUTO: 0.1 % — SIGNIFICANT CHANGE UP (ref 0–6)
GLUCOSE SERPL-MCNC: 99 MG/DL — SIGNIFICANT CHANGE UP (ref 70–99)
HBA1C BLD-MCNC: 6.2 % — HIGH (ref 4–5.6)
HCT VFR BLD CALC: 35 % — LOW (ref 39–50)
HGB BLD-MCNC: 11 G/DL — LOW (ref 13–17)
IMM GRANULOCYTES # BLD AUTO: 1.35 # — SIGNIFICANT CHANGE UP
IMM GRANULOCYTES NFR BLD AUTO: 8.6 % — HIGH (ref 0–1.5)
L PNEUMO AG UR QL: NEGATIVE — SIGNIFICANT CHANGE UP
LYMPHOCYTES # BLD AUTO: 0.71 K/UL — LOW (ref 1–3.3)
LYMPHOCYTES # BLD AUTO: 4.5 % — LOW (ref 13–44)
MAGNESIUM SERPL-MCNC: 2 MG/DL — SIGNIFICANT CHANGE UP (ref 1.6–2.6)
MANUAL SMEAR VERIFICATION: SIGNIFICANT CHANGE UP
MCHC RBC-ENTMCNC: 27.1 PG — SIGNIFICANT CHANGE UP (ref 27–34)
MCHC RBC-ENTMCNC: 31.4 % — LOW (ref 32–36)
MCV RBC AUTO: 86.2 FL — SIGNIFICANT CHANGE UP (ref 80–100)
MONOCYTES # BLD AUTO: 0.13 K/UL — SIGNIFICANT CHANGE UP (ref 0–0.9)
MONOCYTES NFR BLD AUTO: 0.8 % — LOW (ref 2–14)
NEUTROPHILS # BLD AUTO: 13.47 K/UL — HIGH (ref 1.8–7.4)
NEUTROPHILS NFR BLD AUTO: 85.7 % — HIGH (ref 43–77)
NRBC # FLD: 0 — SIGNIFICANT CHANGE UP
PHOSPHATE SERPL-MCNC: 2.4 MG/DL — LOW (ref 2.5–4.5)
PLATELET # BLD AUTO: 102 K/UL — LOW (ref 150–400)
PMV BLD: 13.6 FL — HIGH (ref 7–13)
POTASSIUM SERPL-MCNC: 4.5 MMOL/L — SIGNIFICANT CHANGE UP (ref 3.5–5.3)
POTASSIUM SERPL-SCNC: 4.5 MMOL/L — SIGNIFICANT CHANGE UP (ref 3.5–5.3)
RBC # BLD: 4.06 M/UL — LOW (ref 4.2–5.8)
RBC # FLD: 19.3 % — HIGH (ref 10.3–14.5)
REVIEW TO FOLLOW: YES — SIGNIFICANT CHANGE UP
SODIUM SERPL-SCNC: 142 MMOL/L — SIGNIFICANT CHANGE UP (ref 135–145)
SPECIMEN SOURCE: SIGNIFICANT CHANGE UP
SPECIMEN SOURCE: SIGNIFICANT CHANGE UP
TROPONIN T SERPL-MCNC: < 0.06 NG/ML — SIGNIFICANT CHANGE UP (ref 0–0.06)
TROPONIN T SERPL-MCNC: < 0.06 NG/ML — SIGNIFICANT CHANGE UP (ref 0–0.06)
WBC # BLD: 15.72 K/UL — HIGH (ref 3.8–10.5)
WBC # FLD AUTO: 15.72 K/UL — HIGH (ref 3.8–10.5)

## 2017-07-24 PROCEDURE — 99223 1ST HOSP IP/OBS HIGH 75: CPT | Mod: GC

## 2017-07-24 RX ORDER — ASPIRIN/CALCIUM CARB/MAGNESIUM 324 MG
81 TABLET ORAL DAILY
Qty: 0 | Refills: 0 | Status: DISCONTINUED | OUTPATIENT
Start: 2017-07-24 | End: 2017-07-26

## 2017-07-24 RX ORDER — TAMSULOSIN HYDROCHLORIDE 0.4 MG/1
0.4 CAPSULE ORAL AT BEDTIME
Qty: 0 | Refills: 0 | Status: DISCONTINUED | OUTPATIENT
Start: 2017-07-24 | End: 2017-07-26

## 2017-07-24 RX ORDER — DEXTROSE 50 % IN WATER 50 %
12.5 SYRINGE (ML) INTRAVENOUS ONCE
Qty: 0 | Refills: 0 | Status: DISCONTINUED | OUTPATIENT
Start: 2017-07-24 | End: 2017-07-26

## 2017-07-24 RX ORDER — LIPASE/PROTEASE/AMYLASE 16-48-48K
1 CAPSULE,DELAYED RELEASE (ENTERIC COATED) ORAL
Qty: 0 | Refills: 0 | Status: DISCONTINUED | OUTPATIENT
Start: 2017-07-24 | End: 2017-07-26

## 2017-07-24 RX ORDER — INSULIN LISPRO 100/ML
VIAL (ML) SUBCUTANEOUS
Qty: 0 | Refills: 0 | Status: DISCONTINUED | OUTPATIENT
Start: 2017-07-24 | End: 2017-07-26

## 2017-07-24 RX ORDER — DEXTROSE 50 % IN WATER 50 %
25 SYRINGE (ML) INTRAVENOUS ONCE
Qty: 0 | Refills: 0 | Status: DISCONTINUED | OUTPATIENT
Start: 2017-07-24 | End: 2017-07-26

## 2017-07-24 RX ORDER — DEXTROSE 50 % IN WATER 50 %
1 SYRINGE (ML) INTRAVENOUS ONCE
Qty: 0 | Refills: 0 | Status: DISCONTINUED | OUTPATIENT
Start: 2017-07-24 | End: 2017-07-26

## 2017-07-24 RX ORDER — BUDESONIDE AND FORMOTEROL FUMARATE DIHYDRATE 160; 4.5 UG/1; UG/1
2 AEROSOL RESPIRATORY (INHALATION)
Qty: 0 | Refills: 0 | Status: DISCONTINUED | OUTPATIENT
Start: 2017-07-24 | End: 2017-07-26

## 2017-07-24 RX ORDER — VANCOMYCIN HCL 1 G
1000 VIAL (EA) INTRAVENOUS EVERY 12 HOURS
Qty: 0 | Refills: 0 | Status: DISCONTINUED | OUTPATIENT
Start: 2017-07-24 | End: 2017-07-26

## 2017-07-24 RX ORDER — SODIUM CHLORIDE 9 MG/ML
1000 INJECTION, SOLUTION INTRAVENOUS
Qty: 0 | Refills: 0 | Status: DISCONTINUED | OUTPATIENT
Start: 2017-07-24 | End: 2017-07-26

## 2017-07-24 RX ORDER — ENTECAVIR 0.5 MG/1
0.5 TABLET ORAL DAILY
Qty: 0 | Refills: 0 | Status: DISCONTINUED | OUTPATIENT
Start: 2017-07-24 | End: 2017-07-26

## 2017-07-24 RX ORDER — GLUCAGON INJECTION, SOLUTION 0.5 MG/.1ML
1 INJECTION, SOLUTION SUBCUTANEOUS ONCE
Qty: 0 | Refills: 0 | Status: DISCONTINUED | OUTPATIENT
Start: 2017-07-24 | End: 2017-07-26

## 2017-07-24 RX ORDER — PANTOPRAZOLE SODIUM 20 MG/1
40 TABLET, DELAYED RELEASE ORAL
Qty: 0 | Refills: 0 | Status: DISCONTINUED | OUTPATIENT
Start: 2017-07-24 | End: 2017-07-26

## 2017-07-24 RX ORDER — HEPARIN SODIUM 5000 [USP'U]/ML
5000 INJECTION INTRAVENOUS; SUBCUTANEOUS EVERY 8 HOURS
Qty: 0 | Refills: 0 | Status: DISCONTINUED | OUTPATIENT
Start: 2017-07-24 | End: 2017-07-26

## 2017-07-24 RX ORDER — MEMANTINE HYDROCHLORIDE 10 MG/1
10 TABLET ORAL
Qty: 0 | Refills: 0 | Status: DISCONTINUED | OUTPATIENT
Start: 2017-07-24 | End: 2017-07-26

## 2017-07-24 RX ORDER — TIOTROPIUM BROMIDE 18 UG/1
1 CAPSULE ORAL; RESPIRATORY (INHALATION) DAILY
Qty: 0 | Refills: 0 | Status: DISCONTINUED | OUTPATIENT
Start: 2017-07-24 | End: 2017-07-26

## 2017-07-24 RX ORDER — INSULIN LISPRO 100/ML
VIAL (ML) SUBCUTANEOUS AT BEDTIME
Qty: 0 | Refills: 0 | Status: DISCONTINUED | OUTPATIENT
Start: 2017-07-24 | End: 2017-07-26

## 2017-07-24 RX ORDER — CEFEPIME 1 G/1
1000 INJECTION, POWDER, FOR SOLUTION INTRAMUSCULAR; INTRAVENOUS EVERY 12 HOURS
Qty: 0 | Refills: 0 | Status: DISCONTINUED | OUTPATIENT
Start: 2017-07-24 | End: 2017-07-26

## 2017-07-24 RX ORDER — SERTRALINE 25 MG/1
50 TABLET, FILM COATED ORAL DAILY
Qty: 0 | Refills: 0 | Status: DISCONTINUED | OUTPATIENT
Start: 2017-07-24 | End: 2017-07-26

## 2017-07-24 RX ORDER — SOTALOL HCL 120 MG
80 TABLET ORAL
Qty: 0 | Refills: 0 | Status: DISCONTINUED | OUTPATIENT
Start: 2017-07-24 | End: 2017-07-25

## 2017-07-24 RX ADMIN — MEMANTINE HYDROCHLORIDE 10 MILLIGRAM(S): 10 TABLET ORAL at 13:54

## 2017-07-24 RX ADMIN — SERTRALINE 50 MILLIGRAM(S): 25 TABLET, FILM COATED ORAL at 13:54

## 2017-07-24 RX ADMIN — HEPARIN SODIUM 5000 UNIT(S): 5000 INJECTION INTRAVENOUS; SUBCUTANEOUS at 13:53

## 2017-07-24 RX ADMIN — Medication 1 CAPSULE(S): at 17:51

## 2017-07-24 RX ADMIN — Medication 80 MILLIGRAM(S): at 17:50

## 2017-07-24 RX ADMIN — HEPARIN SODIUM 5000 UNIT(S): 5000 INJECTION INTRAVENOUS; SUBCUTANEOUS at 05:55

## 2017-07-24 RX ADMIN — CEFEPIME 100 MILLIGRAM(S): 1 INJECTION, POWDER, FOR SOLUTION INTRAMUSCULAR; INTRAVENOUS at 05:55

## 2017-07-24 RX ADMIN — PANTOPRAZOLE SODIUM 40 MILLIGRAM(S): 20 TABLET, DELAYED RELEASE ORAL at 06:20

## 2017-07-24 RX ADMIN — Medication 1 CAPSULE(S): at 14:14

## 2017-07-24 RX ADMIN — ENTECAVIR 0.5 MILLIGRAM(S): 0.5 TABLET ORAL at 11:04

## 2017-07-24 RX ADMIN — TIOTROPIUM BROMIDE 1 CAPSULE(S): 18 CAPSULE ORAL; RESPIRATORY (INHALATION) at 14:16

## 2017-07-24 RX ADMIN — Medication 250 MILLIGRAM(S): at 06:27

## 2017-07-24 RX ADMIN — TAMSULOSIN HYDROCHLORIDE 0.4 MILLIGRAM(S): 0.4 CAPSULE ORAL at 22:47

## 2017-07-24 RX ADMIN — Medication 1 CAPSULE(S): at 07:59

## 2017-07-24 RX ADMIN — CEFEPIME 100 MILLIGRAM(S): 1 INJECTION, POWDER, FOR SOLUTION INTRAMUSCULAR; INTRAVENOUS at 18:30

## 2017-07-24 RX ADMIN — Medication 80 MILLIGRAM(S): at 05:56

## 2017-07-24 RX ADMIN — Medication 250 MILLIGRAM(S): at 17:20

## 2017-07-24 RX ADMIN — BUDESONIDE AND FORMOTEROL FUMARATE DIHYDRATE 2 PUFF(S): 160; 4.5 AEROSOL RESPIRATORY (INHALATION) at 22:47

## 2017-07-24 RX ADMIN — MEMANTINE HYDROCHLORIDE 10 MILLIGRAM(S): 10 TABLET ORAL at 17:51

## 2017-07-24 RX ADMIN — Medication 81 MILLIGRAM(S): at 13:52

## 2017-07-24 NOTE — PROGRESS NOTE ADULT - ASSESSMENT
67 year-old Citizen of [place]-speaking man with afib s/p watchman not on anticoagulation, T2DM not on insulin, GERD, thyroid nodules, COPD, per documentation had hep B exposure on antiviral therapy and with poorly differentiated gastric cancer T3N2M0 on chemotherapy admitted with bibasilar pneumonia.

## 2017-07-24 NOTE — H&P ADULT - HISTORY OF PRESENT ILLNESS
Patient is a 67 year-old Afghan-speaking man with afib s/p watchman not on anticoagulation, T2DM not on insulin, GERD, thyroid nodules, COPD, per documentation had hep B exposure on antiviral therapy and poorly differentiated gastric cancer T3N2M0 diagnosed after complaint of 30 lb weight loss over 6 months on systemic chemotherapy (docetaxel, oxaliplatin and 5-FU) last given 2 days ago. He comes to ED complaining of cough productive of green-yellow sputum, "mild" fevers at home as well as pleuritic chest pain (pain in midchest and back exacerbated by coughing or deep breaths). He also endorses rhinorrhea and sore throat. He denies sick contacts or travel. He complained of non-bloody diarrhea however states this is normal for him post chemotherapy. He denies abdominal pain, nausea, vomiting, headache, melena, hemoptysis. Pt denies chest pain radiating to jaw or shoulder.     In ED: TMax 100.2F, , /74, RR 16, SaO2 100% on RA. Patient received vancomycin, cefepime, ciprofloxacin. Noted to be in aflutter with rate in 130s and received cardizem 10 mg x 2, sotalol 80 mg PO. Pt also received 1.5 L bolus NS, sertraline 25 mg x 1 and tylenol 1g IV.

## 2017-07-24 NOTE — PROGRESS NOTE ADULT - PROBLEM SELECTOR PLAN 1
- Chest xray showed clear lungs  - Chest CT was showed airspace opacity in the lower lobes concerning for pneumonia  - continue with vancomycin and cefepime  - check urine legionella  - f/u blood and urine cultures  - hold fluroquinolone now

## 2017-07-24 NOTE — H&P ADULT - PROBLEM SELECTOR PLAN 2
-patient with tachycardia and aflutter, s/p cardizem 10 mg x 2  -sotalol 80 mg PO  -c/w telemetry monitoring  -c/w cardizem; patient takes 120 mg ER daily; would do 30 mg QID with hold parameters inhospital during acute infection  -pt with stable BP -patient with tachycardia and A fib, s/p cardizem 10 mg x 2  -sotalol 80 mg PO  -c/w telemetry monitoring  -c/w cardizem; patient takes 120 mg ER daily; would do 30 mg QID with hold parameters inhospital during acute infection  -pt with stable BP

## 2017-07-24 NOTE — H&P ADULT - PROBLEM SELECTOR PLAN 4
-hold oral hypoglycemic medications  -c/w ISS, check HbA1C -c/w protonix as dexilant not available inpatient

## 2017-07-24 NOTE — H&P ADULT - PROBLEM SELECTOR PLAN 5
-patient on tenofovir alafenamide 25 mg daily which is not available inpatient; will ask patient to bring in home medication  -c/w entecavir 0.5 mg daily -hold oral hypoglycemic medications  -c/w ISS, check HbA1C

## 2017-07-24 NOTE — H&P ADULT - NSHPREVIEWOFSYSTEMS_GEN_ALL_CORE
General: no fever, no chills  Ophthalmologic: no change in vision  ENMT: no sore throat  Respiratory and Thorax: cough, pleuritic chest pain  Cardiovascular: no CP  Gastrointestinal: no abd pain, N/V/D  Genitourinary: no dysuria  Musculoskeletal: no joint pain  Neurological: no HA  Psychiatric: no anxiety/depression  Hematology/Lymphatics: no abnormal bleeding  Endocrine: no changes in weight

## 2017-07-24 NOTE — H&P ADULT - PROBLEM SELECTOR PLAN 3
-c/w protonix as dexilant not available inpatient -likely secondary to infection vs chemotherapy  -continue to monitor

## 2017-07-24 NOTE — PROGRESS NOTE ADULT - PROBLEM SELECTOR PLAN 2
- patient had tachycardia with an episode of aflutter that resolved with cardizem 10 mg x2  - sotalol 80 mg PO  - continue with telemetry monitoring  - continue with cardizem 30 mg

## 2017-07-24 NOTE — H&P ADULT - NSHPOUTPATIENTPROVIDERS_GEN_ALL_CORE
GI: Dr. Rodriguez/ Dr. Montoya  Surg Onc: Dr. Martinez  PCP: Dr. Jessica Fried  Cardiologist: Dr. Yusuf Rae ([facility])

## 2017-07-24 NOTE — ED ADULT NURSE REASSESSMENT NOTE - NS ED NURSE REASSESS COMMENT FT1
Pt remains A&Ox3, denies chest pain, SOB, weakness, chills, fever, n/v/d at this time. Pt appears comfortably in stretcher, respirations even and unlabored, nad noted at this time. AM labs drawn and sent and pt medicated with AM medications as ordered- Abx infusing. Remains sinus tachycardic on tele monitor. Awaiting bed assignment. Will monitor patient closely.
Pt remains A&Ox3, denies chest pain, dizziness, lightheadedness, fever, chills, n/v/d at this time. Pt appears comfortably in stretcher, respirations even and unlabored, nad noted at this time. Repeat CE sent, awaiting bed assignment. Will monitor patient closely.
no s/s of acute distress, pt's family present at bedside this am, updated to POC.

## 2017-07-24 NOTE — H&P ADULT - ASSESSMENT
67 year-old Sri Lankan-speaking man with afib s/p watchman not on anticoagulation, T2DM not on insulin, GERD, thyroid nodules, COPD, per documentation had hep B exposure on antiviral therapy and with poorly differentiated gastric cancer T3N2M0 on chemotherapy admitted with bibasilar pneumonia.

## 2017-07-24 NOTE — PROGRESS NOTE ADULT - SUBJECTIVE AND OBJECTIVE BOX
ANDRES CRAWFORD  67y  Male      Patient is a 67y old  Male who presents with a chief complaint of SOB and pleuritic chest pain (2017 01:17)      INTERVAL HPI/OVERNIGHT EVENTS:  Patient is a 67 year-old Ugandan-speaking man with afib s/p watchman not on anticoagulation, T2DM not on insulin, GERD, thyroid nodules, COPD, per documentation had hep B exposure on antiviral therapy and poorly differentiated gastric cancer T3N2M0 diagnosed after complaint of 30 lb weight loss over 6 months on systemic chemotherapy (docetaxel, oxaliplatin and 5-FU) last given 2 days ago.     Patient comes to the ED complaining of cough productive of green-yellow sputum, He is seen and examined at bedside.  He has been getting SOB associated with C/p for 2-3 days.  Denies sick contacts or travel.  At home, he has fevers and chills.  He complained of non-bloody diarrhea however states this is normal for him post chemotherapy. He denies abdominal pain, nausea, vomiting, headache, melena, hemoptysis.     Of note, patient was in the hospital 3 weeks ago for vomiting and was given fluids.      REVIEW OF SYSTEMS:  CONSTITUTIONAL: No fever, weight loss, or fatigue  RESPIRATORY: No cough, wheezing, chills or hemoptysis; + shortness of breath  CARDIOVASCULAR: + chest pain associated with SOB; no palpitations, dizziness, or leg swelling  GASTROINTESTINAL: No abdominal or epigastric pain. No nausea, vomiting, or hematemesis; No diarrhea or constipation.   MUSCULOSKELETAL: No joint pain or swelling; No muscle, back, or extremity pain  PSYCHIATRIC: No depression, anxiety, mood swings, or difficulty sleeping      T(C): 36.7 (17 @ 17:14), Max: 37.1 (17 @ 05:38)  HR: 126 (17 @ 17:14) (119 - 137)  BP: 142/87 (17 @ 17:14) (104/66 - 153/94)  RR: 17 (17 @ 17:14) (16 - 20)  SpO2: 98% (17 @ 17:14) (96% - 100%)  Wt(kg): --Vital Signs Last 24 Hrs  T(C): 36.7 (2017 17:14), Max: 37.1 (2017 05:38)  T(F): 98.1 (2017 17:14), Max: 98.7 (2017 05:38)  HR: 126 (2017 17:14) (119 - 137)  BP: 142/87 (2017 17:14) (104/66 - 153/94)  BP(mean): --  RR: 17 (2017 17:14) (16 - 20)  SpO2: 98% (2017 17:14) (96% - 100%)  Wt(kg): --    PHYSICAL EXAM:  GENERAL: NAD, well-groomed, well-developed  HEAD:  Atraumatic, Normocephalic  EYES: EOMI, PERRLA, conjunctiva and sclera clear  ENMT: No tonsillar erythema, exudates, or enlargement; Moist mucous membranes, No lesions  NECK: Supple, No JVD, Normal thyroid  NERVOUS SYSTEM:  Alert & Oriented X3, Good concentration;   CHEST/LUNG: crackles in b/l lower bases, no wheezes  HEART: tachycardia, No murmurs, rubs, or gallops  ABDOMEN: Soft, Nontender, Nondistended; Bowel sounds present  EXTREMITIES:   No cyanosis, or 2+edema    Consultant(s) Notes Reviewed:  [x ] YES  [ ] NO  Care Discussed with Consultants/Other Providers [ x] YES  [ ] NO    LABS:                        11.0   15.72 )-----------( 102      ( 2017 05:45 )             35.0     07-24    142  |  105  |  18  ----------------------------<  99  4.5   |  27  |  0.89    Ca    8.4      2017 05:45  Phos  2.4     07-24  Mg     2.0     07-24    TPro  5.5<L>  /  Alb  3.4  /  TBili  0.3  /  DBili  x   /  AST  24  /  ALT  37  /  AlkPhos  134<H>  07-23    PT/INR - ( 2017 16:15 )   PT: 14.7 SEC;   INR: 1.31          PTT - ( 2017 05:45 )  PTT:27.5 SEC  Urinalysis Basic - ( 2017 17:30 )    Color: PLYEL / Appearance: CLEAR / S.012 / pH: 7.5  Gluc: 300 / Ketone: NEGATIVE  / Bili: NEGATIVE / Urobili: NORMAL E.U.   Blood: NEGATIVE / Protein: 20 / Nitrite: NEGATIVE   Leuk Esterase: NEGATIVE / RBC: 0-2 / WBC 0-2   Sq Epi: x / Non Sq Epi: x / Bacteria: x      CAPILLARY BLOOD GLUCOSE  143 (2017 17:59)  120 (2017 08:30)            Urinalysis Basic - ( 2017 17:30 )    Color: PLYEL / Appearance: CLEAR / S.012 / pH: 7.5  Gluc: 300 / Ketone: NEGATIVE  / Bili: NEGATIVE / Urobili: NORMAL E.U.   Blood: NEGATIVE / Protein: 20 / Nitrite: NEGATIVE   Leuk Esterase: NEGATIVE / RBC: 0-2 / WBC 0-2   Sq Epi: x / Non Sq Epi: x / Bacteria: x        RADIOLOGY & ADDITIONAL TESTS:  < from: Xray Chest 2 Views PA/Lat (17 @ 16:36) >    INTERPRETATION:     There is no evidence of focal consolidations.  There is no evidence of pleural effusion.  There is a right chest wall Mediport noted.  The trachea is midline.  There is no evidence of acute bony pathology.  There is evidence of a valve replacement.    < end of copied text >  < from: Xray Chest 2 Views PA/Lat (17 @ 16:36) >    IMPRESSION:  Clear lungs.      < end of copied text >  < from: CT Angio Chest w/ IV Cont (17 @ 20:44) >  No pulmonary embolism in the main, lobar, and segmental pulmonary   arteries. Limited evaluation of subsegmental pulmonary arterial branches   due to motion artifact.    Patchy peribronchovascular airspace opacity in the lower lobes (left   greater than right) concerning for pneumonia.    Incompletely visualized hypodense nodule in the left thyroid lobe   measuring at least up to 3.4 cm in craniocaudal dimension. Thyroid biopsy   was conducted in  at this institution. Correlate with pathology.      < end of copied text >      Imaging Personally Reviewed:  [ ] YES  [ ] NO    HEALTH ISSUES - PROBLEM Dx:  Thrombocytopenia: Thrombocytopenia  Gastric cancer: Gastric cancer  Need for prophylactic measure: Need for prophylactic measure  COPD (chronic obstructive pulmonary disease): COPD (chronic obstructive pulmonary disease)  Hepatitis B: Hepatitis B  Type 2 diabetes mellitus: Type 2 diabetes mellitus  GERD (gastroesophageal reflux disease): GERD (gastroesophageal reflux disease)  Atrial fibrillation: Atrial fibrillation  Pneumonia of both lungs due to infectious organism, unspecified part of lung: Pneumonia of both lungs due to infectious organism, unspecified part of lung

## 2017-07-24 NOTE — H&P ADULT - NSHPLABSRESULTS_GEN_ALL_CORE
10.5   24.79 )-----------( 99       ( 2017 16:15 )             32.6         139  |  101  |  22  ----------------------------<  125<H>  3.8   |  28  |  1.01    Ca    8.2<L>      2017 16:15    TPro  5.5<L>  /  Alb  3.4  /  TBili  0.3  /  DBili  x   /  AST  24  /  ALT  37  /  AlkPhos  134<H>          CAPILLARY BLOOD GLUCOSE        PT/INR - ( 2017 16:15 )   PT: 14.7 SEC;   INR: 1.31          PTT - ( 2017 16:15 )  PTT:27.3 SEC  Urinalysis Basic - ( 2017 17:30 )    Color: PLYEL / Appearance: CLEAR / S.012 / pH: 7.5  Gluc: 300 / Ketone: NEGATIVE  / Bili: NEGATIVE / Urobili: NORMAL E.U.   Blood: NEGATIVE / Protein: 20 / Nitrite: NEGATIVE   Leuk Esterase: NEGATIVE / RBC: 0-2 / WBC 0-2   Sq Epi: x / Non Sq Epi: x / Bacteria: x      < from: CT Angio Chest w/ IV Cont (17 @ 20:44) >    IMPRESSION:    No pulmonary embolism in the main, lobar, and segmental pulmonary   arteries. Limited evaluation of subsegmental pulmonary arterial branches   due to motion artifact.    Patchy peribronchovascular airspace opacity in the lower lobes (left   greater than right) concerning for pneumonia.    Incompletely visualized hypodense nodule in the left thyroid lobe   measuring at least up to 3.4 cm in craniocaudal dimension. Thyroid biopsy   was conducted in  at this institution. Correlate with pathology.    < end of copied text >

## 2017-07-24 NOTE — H&P ADULT - NSHPPHYSICALEXAM_GEN_ALL_CORE
Vital Signs Last 24 Hrs  T(C): 36.5 (23 Jul 2017 21:12), Max: 37.9 (23 Jul 2017 17:20)  T(F): 97.7 (23 Jul 2017 21:12), Max: 100.2 (23 Jul 2017 17:20)  HR: 119 (24 Jul 2017 00:58) (119 - 140)  BP: 123/71 (24 Jul 2017 00:58) (115/68 - 136/74)  BP(mean): --  RR: 16 (24 Jul 2017 00:58) (16 - 18)  SpO2: 100% (24 Jul 2017 00:58) (99% - 100%)    Constitutional: no acute distress   Eyes: clear conjunctiva  ENMT: mild pharyngeal erythema  Respiratory: Bilateral expiratory wheezes and rhonchi   Cardiovascular: S1, S2, tachycardic   Gastrointestinal: soft, mild diffuse tenderness  Extremities: +1 bilateral pitting edema  Neurological: AAO x 3  Skin: warm, dry  Lymph Nodes: enlarged tonsils palpable   Musculoskeletal: no calf tenderness   Psychiatric: normal affect; pt reasonably upset he is not in a room despite waiting all day

## 2017-07-24 NOTE — H&P ADULT - PROBLEM SELECTOR PLAN 7
-HSQ for DVT ppx -notify oncologist in AM -c/w spiriva/symbicort  -would hold off on albuterol inhaler for now given tachycardia  -xopenex if needed

## 2017-07-24 NOTE — H&P ADULT - PROBLEM SELECTOR PLAN 1
-c/w vancomycin and cefepime  -check urine legionella -c/w vancomycin and cefepime  -check urine legionella  -f/u blood and urine cultures  -hold fluoroquinolone for now.

## 2017-07-25 LAB
BASOPHILS # BLD AUTO: 0.07 K/UL — SIGNIFICANT CHANGE UP (ref 0–0.2)
BASOPHILS NFR BLD AUTO: 2 % — SIGNIFICANT CHANGE UP (ref 0–2)
BUN SERPL-MCNC: 15 MG/DL — SIGNIFICANT CHANGE UP (ref 7–23)
CALCIUM SERPL-MCNC: 8.3 MG/DL — LOW (ref 8.4–10.5)
CHLORIDE SERPL-SCNC: 103 MMOL/L — SIGNIFICANT CHANGE UP (ref 98–107)
CO2 SERPL-SCNC: 24 MMOL/L — SIGNIFICANT CHANGE UP (ref 22–31)
CREAT SERPL-MCNC: 0.94 MG/DL — SIGNIFICANT CHANGE UP (ref 0.5–1.3)
EOSINOPHIL # BLD AUTO: 0.01 K/UL — SIGNIFICANT CHANGE UP (ref 0–0.5)
EOSINOPHIL NFR BLD AUTO: 0.3 % — SIGNIFICANT CHANGE UP (ref 0–6)
GLUCOSE SERPL-MCNC: 109 MG/DL — HIGH (ref 70–99)
HCT VFR BLD CALC: 31.3 % — LOW (ref 39–50)
HGB BLD-MCNC: 10.1 G/DL — LOW (ref 13–17)
IMM GRANULOCYTES # BLD AUTO: 0.29 # — SIGNIFICANT CHANGE UP
IMM GRANULOCYTES NFR BLD AUTO: 8.2 % — HIGH (ref 0–1.5)
LYMPHOCYTES # BLD AUTO: 0.54 K/UL — LOW (ref 1–3.3)
LYMPHOCYTES # BLD AUTO: 15.3 % — SIGNIFICANT CHANGE UP (ref 13–44)
MAGNESIUM SERPL-MCNC: 2 MG/DL — SIGNIFICANT CHANGE UP (ref 1.6–2.6)
MANUAL SMEAR VERIFICATION: SIGNIFICANT CHANGE UP
MCHC RBC-ENTMCNC: 26.7 PG — LOW (ref 27–34)
MCHC RBC-ENTMCNC: 32.3 % — SIGNIFICANT CHANGE UP (ref 32–36)
MCV RBC AUTO: 82.8 FL — SIGNIFICANT CHANGE UP (ref 80–100)
MONOCYTES # BLD AUTO: 0.21 K/UL — SIGNIFICANT CHANGE UP (ref 0–0.9)
MONOCYTES NFR BLD AUTO: 5.9 % — SIGNIFICANT CHANGE UP (ref 2–14)
NEUTROPHILS # BLD AUTO: 2.41 K/UL — SIGNIFICANT CHANGE UP (ref 1.8–7.4)
NEUTROPHILS NFR BLD AUTO: 68.3 % — SIGNIFICANT CHANGE UP (ref 43–77)
NRBC # FLD: 0 — SIGNIFICANT CHANGE UP
PHOSPHATE SERPL-MCNC: 2.5 MG/DL — SIGNIFICANT CHANGE UP (ref 2.5–4.5)
PLATELET # BLD AUTO: 107 K/UL — LOW (ref 150–400)
PMV BLD: 12.3 FL — SIGNIFICANT CHANGE UP (ref 7–13)
POTASSIUM SERPL-MCNC: 3.9 MMOL/L — SIGNIFICANT CHANGE UP (ref 3.5–5.3)
POTASSIUM SERPL-SCNC: 3.9 MMOL/L — SIGNIFICANT CHANGE UP (ref 3.5–5.3)
RBC # BLD: 3.78 M/UL — LOW (ref 4.2–5.8)
RBC # FLD: 19.4 % — HIGH (ref 10.3–14.5)
REVIEW TO FOLLOW: YES — SIGNIFICANT CHANGE UP
SODIUM SERPL-SCNC: 140 MMOL/L — SIGNIFICANT CHANGE UP (ref 135–145)
SPECIMEN SOURCE: SIGNIFICANT CHANGE UP
VANCOMYCIN TROUGH SERPL-MCNC: 9.2 UG/ML — LOW (ref 10–20)
WBC # BLD: 3.53 K/UL — LOW (ref 3.8–10.5)
WBC # FLD AUTO: 3.53 K/UL — LOW (ref 3.8–10.5)

## 2017-07-25 PROCEDURE — 99233 SBSQ HOSP IP/OBS HIGH 50: CPT | Mod: GC

## 2017-07-25 RX ORDER — SOTALOL HCL 120 MG
40 TABLET ORAL ONCE
Qty: 0 | Refills: 0 | Status: DISCONTINUED | OUTPATIENT
Start: 2017-07-25 | End: 2017-07-25

## 2017-07-25 RX ORDER — SOTALOL HCL 120 MG
80 TABLET ORAL THREE TIMES A DAY
Qty: 0 | Refills: 0 | Status: DISCONTINUED | OUTPATIENT
Start: 2017-07-25 | End: 2017-07-26

## 2017-07-25 RX ORDER — IPRATROPIUM/ALBUTEROL SULFATE 18-103MCG
3 AEROSOL WITH ADAPTER (GRAM) INHALATION EVERY 6 HOURS
Qty: 0 | Refills: 0 | Status: DISCONTINUED | OUTPATIENT
Start: 2017-07-25 | End: 2017-07-26

## 2017-07-25 RX ADMIN — PANTOPRAZOLE SODIUM 40 MILLIGRAM(S): 20 TABLET, DELAYED RELEASE ORAL at 05:01

## 2017-07-25 RX ADMIN — ENTECAVIR 0.5 MILLIGRAM(S): 0.5 TABLET ORAL at 12:09

## 2017-07-25 RX ADMIN — Medication 80 MILLIGRAM(S): at 05:01

## 2017-07-25 RX ADMIN — Medication 3 MILLILITER(S): at 21:42

## 2017-07-25 RX ADMIN — Medication 80 MILLIGRAM(S): at 23:29

## 2017-07-25 RX ADMIN — Medication 3 MILLILITER(S): at 16:38

## 2017-07-25 RX ADMIN — Medication 250 MILLIGRAM(S): at 18:25

## 2017-07-25 RX ADMIN — CEFEPIME 100 MILLIGRAM(S): 1 INJECTION, POWDER, FOR SOLUTION INTRAMUSCULAR; INTRAVENOUS at 17:36

## 2017-07-25 RX ADMIN — Medication 81 MILLIGRAM(S): at 12:09

## 2017-07-25 RX ADMIN — BUDESONIDE AND FORMOTEROL FUMARATE DIHYDRATE 2 PUFF(S): 160; 4.5 AEROSOL RESPIRATORY (INHALATION) at 12:10

## 2017-07-25 RX ADMIN — BUDESONIDE AND FORMOTEROL FUMARATE DIHYDRATE 2 PUFF(S): 160; 4.5 AEROSOL RESPIRATORY (INHALATION) at 23:19

## 2017-07-25 RX ADMIN — TIOTROPIUM BROMIDE 1 CAPSULE(S): 18 CAPSULE ORAL; RESPIRATORY (INHALATION) at 12:10

## 2017-07-25 RX ADMIN — CEFEPIME 100 MILLIGRAM(S): 1 INJECTION, POWDER, FOR SOLUTION INTRAMUSCULAR; INTRAVENOUS at 05:02

## 2017-07-25 RX ADMIN — Medication 250 MILLIGRAM(S): at 06:58

## 2017-07-25 RX ADMIN — Medication 1 CAPSULE(S): at 12:09

## 2017-07-25 NOTE — PROGRESS NOTE ADULT - PROBLEM SELECTOR PLAN 3
- likely secondary to infection vs chemotherapy   - platelets today 107 (102 previously)  - monitor platelets

## 2017-07-25 NOTE — PROGRESS NOTE ADULT - PROBLEM SELECTOR PLAN 2
- patient had tachycardia with an episode of aflutter that resolved with cardizem 10 mg x2  - sotalol 80 mg PO  - continue with telemetry monitoring; pt had no overnight events on tele, no tachycardia overnight.  - continue with cardizem 30 mg - patient had tachycardia with an episode of aflutter that resolved with cardizem 10 mg x2  - continue sotalol 80 mg PO  - continue with telemetry monitoring; pt had no overnight events on tele, no tachycardia overnight.  - patient continues to be tachycardic, increase cardizem to 60mg

## 2017-07-25 NOTE — PROGRESS NOTE ADULT - ASSESSMENT
67 year-old [place]-speaking man with afib s/p watchman not on anticoagulation, T2DM not on insulin, GERD, thyroid nodules, COPD, per documentation had hep B exposure on antiviral therapy and with poorly differentiated gastric cancer T3N2M0 on chemotherapy admitted with bibasilar pneumonia.

## 2017-07-25 NOTE — PROGRESS NOTE ADULT - PROBLEM SELECTOR PLAN 1
- Chest xray showed clear lungs  - Chest CT was showed airspace opacity in the lower lobes concerning for pneumonia  - continue with vancomycin and cefepime  - Blood cultures negative  - Urine negative for legionella

## 2017-07-25 NOTE — PROGRESS NOTE ADULT - SUBJECTIVE AND OBJECTIVE BOX
DAMIONNBANDRES JACKSON  67y  Male      Patient is a 67y old  Male who presents with a chief complaint of SOB and pleuritic chest pain (2017 01:17)      INTERVAL HPI/OVERNIGHT EVENTS:  Pt was seen and examined at bedside. Pt on tele monitoring. No overnight events. She continues to have SOB.  Denies cough, palpitations, nausea, vomiting, diarrhea, constipation.      REVIEW OF SYSTEMS:  CONSTITUTIONAL: No fever, weight loss, or fatigue  RESPIRATORY: No cough, wheezing; + shortness of breath  CARDIOVASCULAR: No chest pain, palpitations, dizziness  GASTROINTESTINAL: No abdominal or epigastric pain. No nausea, vomiting; No diarrhea or constipation.  NEUROLOGICAL: No headaches,   MUSCULOSKELETAL: No muscle, back, or extremity pain  PSYCHIATRIC: No difficulty sleeping      T(C): 36.6 (17 @ 22:03), Max: 37.1 (17 @ 09:01)  HR: 128 (17 @ 00:16) (120 - 128)  BP: 126/91 (17 @ 00:16) (104/66 - 153/94)  RR: 18 (17 @ 22:03) (17 - 20)  SpO2: 99% (17 @ 22:03) (97% - 100%)  Wt(kg): --Vital Signs Last 24 Hrs  T(C): 36.6 (2017 22:03), Max: 37.1 (2017 09:01)  T(F): 97.9 (2017 22:03), Max: 98.7 (2017 09:01)  HR: 128 (2017 00:16) (120 - 128)  BP: 126/91 (2017 00:16) (104/66 - 153/94)  BP(mean): --  RR: 18 (2017 22:03) (17 - 20)  SpO2: 99% (2017 22:03) (97% - 100%)  Wt(kg): --    PHYSICAL EXAM:  GENERAL: NAD, well-groomed, well-developed  HEAD:  Atraumatic, Normocephalic  NECK: Supple, No JVD, Normal thyroid  NERVOUS SYSTEM:  Alert & Oriented X3, Good concentration;  CHEST/LUNG: crackles in LLL > RLL, no wheezes  HEART: Regular rate and rhythm; No murmurs, rubs, or gallops  ABDOMEN: Soft, Nontender, Nondistended  EXTREMITIES:   +1 edema b/l lower extremity  LYMPH: No lymphadenopathy noted  SKIN: erythematous macular rash consistent with venous stasis lower extremities      Consultant(s) Notes Reviewed:  [x ] YES  [ ] NO  Care Discussed with Consultants/Other Providers [ x] YES  [ ] NO    LABS:                        10.1   3.53  )-----------( 107      ( 2017 06:40 )             31.3     07-    140  |  103  |  15  ----------------------------<  109<H>  3.9   |  24  |  0.94    Ca    8.3<L>      2017 06:40  Phos  2.5       Mg     2.0         TPro  5.5<L>  /  Alb  3.4  /  TBili  0.3  /  DBili  x   /  AST  24  /  ALT  37  /  AlkPhos  134<H>  23    PT/INR - ( 2017 16:15 )   PT: 14.7 SEC;   INR: 1.31          PTT - ( 2017 05:45 )  PTT:27.5 SEC  Urinalysis Basic - ( 2017 17:30 )    Color: PLYEL / Appearance: CLEAR / S.012 / pH: 7.5  Gluc: 300 / Ketone: NEGATIVE  / Bili: NEGATIVE / Urobili: NORMAL E.U.   Blood: NEGATIVE / Protein: 20 / Nitrite: NEGATIVE   Leuk Esterase: NEGATIVE / RBC: 0-2 / WBC 0-2   Sq Epi: x / Non Sq Epi: x / Bacteria: x      CAPILLARY BLOOD GLUCOSE  143 (2017 08:23)  145 (2017 22:09)  143 (2017 17:59)            Urinalysis Basic - ( 2017 17:30 )    Color: PLYEL / Appearance: CLEAR / S.012 / pH: 7.5  Gluc: 300 / Ketone: NEGATIVE  / Bili: NEGATIVE / Urobili: NORMAL E.U.   Blood: NEGATIVE / Protein: 20 / Nitrite: NEGATIVE   Leuk Esterase: NEGATIVE / RBC: 0-2 / WBC 0-2   Sq Epi: x / Non Sq Epi: x / Bacteria: x        RADIOLOGY & ADDITIONAL TESTS:    Imaging Personally Reviewed:  [ ] YES  [ ] NO    HEALTH ISSUES - PROBLEM Dx:  Thrombocytopenia: Thrombocytopenia  Gastric cancer: Gastric cancer  Need for prophylactic measure: Need for prophylactic measure  COPD (chronic obstructive pulmonary disease): COPD (chronic obstructive pulmonary disease)  Hepatitis B: Hepatitis B  Type 2 diabetes mellitus: Type 2 diabetes mellitus  GERD (gastroesophageal reflux disease): GERD (gastroesophageal reflux disease)  Atrial fibrillation: Atrial fibrillation  Pneumonia of both lungs due to infectious organism, unspecified part of lung: Pneumonia of both lungs due to infectious organism, unspecified part of lung HECOLT ANDRES  67y  Male      Patient is a 67y old  Male who presents with a chief complaint of SOB and pleuritic chest pain (2017 01:17)      INTERVAL HPI/OVERNIGHT EVENTS:  Pt was seen and examined at bedside. Pt on tele monitoring. No overnight events, however patient occasionally is tachycardic. He continues to have SOB.  Denies cough, palpitations, nausea, vomiting, diarrhea, constipation.      REVIEW OF SYSTEMS:  CONSTITUTIONAL: No fever, weight loss, or fatigue  RESPIRATORY: No cough, wheezing; + shortness of breath  CARDIOVASCULAR: No chest pain, palpitations, dizziness  GASTROINTESTINAL: No abdominal or epigastric pain. No nausea, vomiting; No diarrhea or constipation.  NEUROLOGICAL: No headaches,   MUSCULOSKELETAL: No muscle, back, or extremity pain  PSYCHIATRIC: No difficulty sleeping      T(C): 36.6 (17 @ 22:03), Max: 37.1 (17 @ 09:01)  HR: 128 (17 @ 00:16) (120 - 128)  BP: 126/91 (17 @ 00:16) (104/66 - 153/94)  RR: 18 (17 @ 22:03) (17 - 20)  SpO2: 99% (17 @ 22:03) (97% - 100%)  Wt(kg): --Vital Signs Last 24 Hrs  T(C): 36.6 (2017 22:03), Max: 37.1 (2017 09:01)  T(F): 97.9 (2017 22:03), Max: 98.7 (2017 09:01)  HR: 128 (2017 00:16) (120 - 128)  BP: 126/91 (2017 00:16) (104/66 - 153/94)  BP(mean): --  RR: 18 (2017 22:03) (17 - 20)  SpO2: 99% (2017 22:03) (97% - 100%)  Wt(kg): --    PHYSICAL EXAM:  GENERAL: NAD, well-groomed, well-developed  HEAD:  Atraumatic, Normocephalic  NECK: Supple, No JVD, Normal thyroid  NERVOUS SYSTEM:  Alert & Oriented X3, Good concentration;  CHEST/LUNG: crackles in LLL > RLL, no wheezes  HEART: Regular rate and rhythm; No murmurs, rubs, or gallops  ABDOMEN: Soft, Nontender, Nondistended  EXTREMITIES:   +1 edema b/l lower extremity  LYMPH: No lymphadenopathy noted  SKIN: erythematous macular rash consistent with venous stasis changes on lower extremities      Consultant(s) Notes Reviewed:  [x ] YES  [ ] NO  Care Discussed with Consultants/Other Providers [ x] YES  [ ] NO    LABS:                        10.1   3.53  )-----------( 107      ( 2017 06:40 )             31.3     07-25    140  |  103  |  15  ----------------------------<  109<H>  3.9   |  24  |  0.94    Ca    8.3<L>      2017 06:40  Phos  2.5       Mg     2.0         TPro  5.5<L>  /  Alb  3.4  /  TBili  0.3  /  DBili  x   /  AST  24  /  ALT  37  /  AlkPhos  134<H>  07-23    PT/INR - ( 2017 16:15 )   PT: 14.7 SEC;   INR: 1.31          PTT - ( 2017 05:45 )  PTT:27.5 SEC  Urinalysis Basic - ( 2017 17:30 )    Color: PLYEL / Appearance: CLEAR / S.012 / pH: 7.5  Gluc: 300 / Ketone: NEGATIVE  / Bili: NEGATIVE / Urobili: NORMAL E.U.   Blood: NEGATIVE / Protein: 20 / Nitrite: NEGATIVE   Leuk Esterase: NEGATIVE / RBC: 0-2 / WBC 0-2   Sq Epi: x / Non Sq Epi: x / Bacteria: x      CAPILLARY BLOOD GLUCOSE  143 (2017 08:23)  145 (2017 22:09)  143 (2017 17:59)            Urinalysis Basic - ( 2017 17:30 )    Color: PLYEL / Appearance: CLEAR / S.012 / pH: 7.5  Gluc: 300 / Ketone: NEGATIVE  / Bili: NEGATIVE / Urobili: NORMAL E.U.   Blood: NEGATIVE / Protein: 20 / Nitrite: NEGATIVE   Leuk Esterase: NEGATIVE / RBC: 0-2 / WBC 0-2   Sq Epi: x / Non Sq Epi: x / Bacteria: x        RADIOLOGY & ADDITIONAL TESTS:    Imaging Personally Reviewed:  [ ] YES  [ ] NO    HEALTH ISSUES - PROBLEM Dx:  Thrombocytopenia: Thrombocytopenia  Gastric cancer: Gastric cancer  Need for prophylactic measure: Need for prophylactic measure  COPD (chronic obstructive pulmonary disease): COPD (chronic obstructive pulmonary disease)  Hepatitis B: Hepatitis B  Type 2 diabetes mellitus: Type 2 diabetes mellitus  GERD (gastroesophageal reflux disease): GERD (gastroesophageal reflux disease)  Atrial fibrillation: Atrial fibrillation  Pneumonia of both lungs due to infectious organism, unspecified part of lung: Pneumonia of both lungs due to infectious organism, unspecified part of lung HECOLT ANDRES  67y  Male      Patient is a 67y old  Male who presents with a chief complaint of SOB and pleuritic chest pain (2017 01:17)      INTERVAL HPI/OVERNIGHT EVENTS:  Pt was seen and examined at bedside. Pt on tele monitoring. No overnight events, however patient occasionally is tachycardic. He continues to have SOB.  Denies cough, palpitations, nausea, vomiting, diarrhea, constipation.      REVIEW OF SYSTEMS:  CONSTITUTIONAL: No fever, weight loss, or fatigue  RESPIRATORY: No cough, wheezing; + shortness of breath  CARDIOVASCULAR: No chest pain, palpitations, dizziness  GASTROINTESTINAL: No abdominal or epigastric pain. No nausea, vomiting; No diarrhea or constipation.  NEUROLOGICAL: No headaches,   MUSCULOSKELETAL: No muscle, back, or extremity pain  PSYCHIATRIC: No difficulty sleeping      T(C): 36.6 (17 @ 22:03), Max: 37.1 (17 @ 09:01)  HR: 128 (17 @ 00:16) (120 - 128)  BP: 126/91 (17 @ 00:16) (104/66 - 153/94)  RR: 18 (17 @ 22:03) (17 - 20)  SpO2: 99% (17 @ 22:03) (97% - 100%)  Wt(kg): --Vital Signs Last 24 Hrs  T(C): 36.6 (2017 22:03), Max: 37.1 (2017 09:01)  T(F): 97.9 (2017 22:03), Max: 98.7 (2017 09:01)  HR: 128 (2017 00:16) (120 - 128)  BP: 126/91 (2017 00:16) (104/66 - 153/94)  BP(mean): --  RR: 18 (2017 22:03) (17 - 20)  SpO2: 99% (2017 22:03) (97% - 100%)  Wt(kg): --    PHYSICAL EXAM:  GENERAL: NAD, well-groomed, well-developed  HEAD:  Atraumatic, Normocephalic  NECK: Supple, No JVD, Normal thyroid  NERVOUS SYSTEM:  Alert & Oriented X3, Good concentration;  CHEST/LUNG: crackles in LLL > RLL, no wheezes  HEART: tachy; No murmurs, rubs, or gallops  ABDOMEN: Soft, Nontender, Nondistended  EXTREMITIES:   +1 edema b/l lower extremity  LYMPH: No lymphadenopathy noted  SKIN: erythematous macular rash consistent with venous stasis changes on lower extremities      Consultant(s) Notes Reviewed:  [x ] YES  [ ] NO  Care Discussed with Consultants/Other Providers [ x] YES  [ ] NO    LABS:                        10.1   3.53  )-----------( 107      ( 2017 06:40 )             31.3     07-25    140  |  103  |  15  ----------------------------<  109<H>  3.9   |  24  |  0.94    Ca    8.3<L>      2017 06:40  Phos  2.5       Mg     2.0         TPro  5.5<L>  /  Alb  3.4  /  TBili  0.3  /  DBili  x   /  AST  24  /  ALT  37  /  AlkPhos  134<H>  07-23    PT/INR - ( 2017 16:15 )   PT: 14.7 SEC;   INR: 1.31          PTT - ( 2017 05:45 )  PTT:27.5 SEC  Urinalysis Basic - ( 2017 17:30 )    Color: PLYEL / Appearance: CLEAR / S.012 / pH: 7.5  Gluc: 300 / Ketone: NEGATIVE  / Bili: NEGATIVE / Urobili: NORMAL E.U.   Blood: NEGATIVE / Protein: 20 / Nitrite: NEGATIVE   Leuk Esterase: NEGATIVE / RBC: 0-2 / WBC 0-2   Sq Epi: x / Non Sq Epi: x / Bacteria: x      CAPILLARY BLOOD GLUCOSE  143 (2017 08:23)  145 (2017 22:09)  143 (2017 17:59)            Urinalysis Basic - ( 2017 17:30 )    Color: PLYEL / Appearance: CLEAR / S.012 / pH: 7.5  Gluc: 300 / Ketone: NEGATIVE  / Bili: NEGATIVE / Urobili: NORMAL E.U.   Blood: NEGATIVE / Protein: 20 / Nitrite: NEGATIVE   Leuk Esterase: NEGATIVE / RBC: 0-2 / WBC 0-2   Sq Epi: x / Non Sq Epi: x / Bacteria: x        RADIOLOGY & ADDITIONAL TESTS:    Imaging Personally Reviewed:  [ ] YES  [ ] NO    HEALTH ISSUES - PROBLEM Dx:  Thrombocytopenia: Thrombocytopenia  Gastric cancer: Gastric cancer  Need for prophylactic measure: Need for prophylactic measure  COPD (chronic obstructive pulmonary disease): COPD (chronic obstructive pulmonary disease)  Hepatitis B: Hepatitis B  Type 2 diabetes mellitus: Type 2 diabetes mellitus  GERD (gastroesophageal reflux disease): GERD (gastroesophageal reflux disease)  Atrial fibrillation: Atrial fibrillation  Pneumonia of both lungs due to infectious organism, unspecified part of lung: Pneumonia of both lungs due to infectious organism, unspecified part of lung

## 2017-07-26 ENCOUNTER — TRANSCRIPTION ENCOUNTER (OUTPATIENT)
Age: 67
End: 2017-07-26

## 2017-07-26 VITALS
DIASTOLIC BLOOD PRESSURE: 70 MMHG | OXYGEN SATURATION: 100 % | SYSTOLIC BLOOD PRESSURE: 129 MMHG | HEART RATE: 125 BPM | RESPIRATION RATE: 18 BRPM

## 2017-07-26 LAB
HCT VFR BLD CALC: 32.1 % — LOW (ref 39–50)
HGB BLD-MCNC: 10.4 G/DL — LOW (ref 13–17)
MCHC RBC-ENTMCNC: 26.7 PG — LOW (ref 27–34)
MCHC RBC-ENTMCNC: 32.4 % — SIGNIFICANT CHANGE UP (ref 32–36)
MCV RBC AUTO: 82.3 FL — SIGNIFICANT CHANGE UP (ref 80–100)
NRBC # FLD: 0 — SIGNIFICANT CHANGE UP
PLATELET # BLD AUTO: 129 K/UL — LOW (ref 150–400)
PMV BLD: 12.1 FL — SIGNIFICANT CHANGE UP (ref 7–13)
RBC # BLD: 3.9 M/UL — LOW (ref 4.2–5.8)
RBC # FLD: 19.2 % — HIGH (ref 10.3–14.5)
WBC # BLD: 3.16 K/UL — LOW (ref 3.8–10.5)
WBC # FLD AUTO: 3.16 K/UL — LOW (ref 3.8–10.5)

## 2017-07-26 PROCEDURE — 99239 HOSP IP/OBS DSCHRG MGMT >30: CPT

## 2017-07-26 RX ORDER — SOTALOL HCL 120 MG
80 TABLET ORAL
Qty: 0 | Refills: 0 | Status: DISCONTINUED | OUTPATIENT
Start: 2017-07-26 | End: 2017-07-26

## 2017-07-26 RX ORDER — DILTIAZEM HCL 120 MG
1 CAPSULE, EXT RELEASE 24 HR ORAL
Qty: 30 | Refills: 0 | OUTPATIENT
Start: 2017-07-26 | End: 2017-08-25

## 2017-07-26 RX ORDER — SOTALOL HCL 120 MG
40 TABLET ORAL THREE TIMES A DAY
Qty: 0 | Refills: 0 | Status: DISCONTINUED | OUTPATIENT
Start: 2017-07-26 | End: 2017-07-26

## 2017-07-26 RX ORDER — SOTALOL HCL 120 MG
40 TABLET ORAL
Qty: 0 | Refills: 0 | COMMUNITY

## 2017-07-26 RX ORDER — SOTALOL HCL 120 MG
1 TABLET ORAL
Qty: 60 | Refills: 0 | OUTPATIENT
Start: 2017-07-26 | End: 2017-08-25

## 2017-07-26 RX ORDER — DILTIAZEM HCL 120 MG
1 CAPSULE, EXT RELEASE 24 HR ORAL
Qty: 0 | Refills: 0 | COMMUNITY

## 2017-07-26 RX ADMIN — Medication 250 MILLIGRAM(S): at 06:23

## 2017-07-26 RX ADMIN — Medication 40 MILLIGRAM(S): at 10:03

## 2017-07-26 RX ADMIN — Medication 81 MILLIGRAM(S): at 13:41

## 2017-07-26 RX ADMIN — CEFEPIME 100 MILLIGRAM(S): 1 INJECTION, POWDER, FOR SOLUTION INTRAMUSCULAR; INTRAVENOUS at 06:22

## 2017-07-26 RX ADMIN — TIOTROPIUM BROMIDE 1 CAPSULE(S): 18 CAPSULE ORAL; RESPIRATORY (INHALATION) at 10:04

## 2017-07-26 RX ADMIN — BUDESONIDE AND FORMOTEROL FUMARATE DIHYDRATE 2 PUFF(S): 160; 4.5 AEROSOL RESPIRATORY (INHALATION) at 10:04

## 2017-07-26 RX ADMIN — Medication 3 MILLILITER(S): at 04:17

## 2017-07-26 NOTE — DISCHARGE NOTE ADULT - HOSPITAL COURSE
67 year-old Bangladeshi-speaking man with afib s/p watchman not on anticoagulation, T2DM not on insulin, GERD, thyroid nodules, COPD, per documentation had hep B exposure on antiviral therapy and poorly differentiated gastric cancer T3N2M0 on systemic chemotherapy (docetaxel, oxaliplatin and 5-FU) last given 2 days prior to admission. He comes to ED complaining of cough productive of green-yellow sputum, "mild" fevers at home as well as pleuritic chest pain (pain in midchest and back exacerbated by coughing or deep breaths). He also endorses rhinorrhea and sore throat. He had diarrhea after his chemotherapy, which was normal for him and resolved.    Pt in ED noted to be in aflutter with rate in 130s and received cardizem 10 mg x 2 and restarted on his home dose sotalol medication. Patient also had an episode of vomiting. Given his tachycardia and vomiting a CTA was ordered which did not reveal PE or obstruction although revealed PNA at the lower bases. Patient was started on vancomycin and cefepime - he was septic with a white count greater than 30. He was also found to have enterovirus. Throughout admission patient had normalized temperature, afebrile, and his white count decreased significantly. Patient was ambulatory and wanted to go home on the first day of admission, denied any symptoms and no more vomiting. His heart rate remained in the 120s and his dilitazem was titrated up to 180 mg CD and continued on his home dose recommended sotalol dosing. His PMD was called (Dr. Nelson, not related), and confirmed the sotalol dosing and was OK that patient would be dc with elevated heart rate, as long as his BP is otherwise stable. Confirmed upon dc, patient sBP was >120.     He was switched over to levofloxacin for a course of 7 days. He was instructed to follow up with his PMD less than a week after discharge.

## 2017-07-26 NOTE — PROGRESS NOTE ADULT - PROBLEM SELECTOR PLAN 6
- patient is on tenofovir alafenamide 25 mg daily, but it is not available inpatient   - continue with entecavir 0.5mg daily

## 2017-07-26 NOTE — PROGRESS NOTE ADULT - PROBLEM SELECTOR PLAN 1
- Chest xray showed clear lungs  - Chest CT was showed airspace opacity in the lower lobes concerning for pneumonia  - continue with vancomycin and cefepime  - Blood cultures negative  - Urine negative for legionella - Chest CT on admission was showed airspace opacity in the lower lobes concerning for pneumonia  - Lungs sound better today, rhonchi appreciated on exam but less than yesterday; patient improving  - continue with vancomycin and cefepime  - Blood cultures negative  - Urine negative for legionella - Chest CT on admission was showed airspace opacity in the lower lobes concerning for pneumonia  - Lungs sound better today, rhonchi appreciated on exam but less than yesterday; patient improving  - Blood cultures negative; Urine negative for legionella  - discontinued IV vancomycin and cefepime  - Start levofloxacin 750 for 3 days - Chest CT on admission was showed airspace opacity in the lower lobes concerning for pneumonia  - Lungs sound better today, rhonchi appreciated on exam but less than yesterday; patient improving  - Blood cultures negative; Urine negative for legionella  - discontinued IV vancomycin and cefepime  - Start levofloxacin 750 for 5 days

## 2017-07-26 NOTE — PROGRESS NOTE ADULT - PROBLEM SELECTOR PROBLEM 1
Pneumonia of both lungs due to infectious organism, unspecified part of lung

## 2017-07-26 NOTE — PROGRESS NOTE ADULT - PROBLEM SELECTOR PLAN 3
- likely secondary to infection vs chemotherapy   - platelets today 107 (102 previously)  - monitor platelets - likely secondary to infection vs chemotherapy   - platelets today 129 (107 previously)  - monitor platelets

## 2017-07-26 NOTE — PROGRESS NOTE ADULT - PROBLEM SELECTOR PLAN 5
- continue with insulin sliding scale   - HbA1c is 6.2

## 2017-07-26 NOTE — PROGRESS NOTE ADULT - PROBLEM SELECTOR PLAN 7
- continue with spiriva and symbicort  - patient has tachycardia so hold off on albuterol  - xopenex if needed

## 2017-07-26 NOTE — PROGRESS NOTE ADULT - ATTENDING COMMENTS
Patient seen and examined by me. Case discussed with resident and agree with findings and plan as documented in the resident's note. He still has diffuse rhonci on exam ; tachy is likely secondary to underlying pna; continue IV abx until tomorrow, then may change to po Levaquin to complete course; needs neb treatments and Symbicort/Spiriva.  Continue rate controlloing meds (sotalol and cardizem).
Patient seen and examined by me. Case discussed with resident and agree with findings and plan as documented in the resident's note. He is clinically much better and can be transitioned to oral Levaquin to complete 7 day course.  He can be discharged home today with close outpt f/u with PMD and oncologist.  60 min spent.

## 2017-07-26 NOTE — PROGRESS NOTE ADULT - PROBLEM SELECTOR PLAN 2
- patient had tachycardia with an episode of aflutter that resolved with cardizem 10 mg x2  - continue sotalol 80 mg PO  - continue with telemetry monitoring; pt had no overnight events on tele, no tachycardia overnight.  - patient continues to be tachycardic, increase cardizem to 60mg - patient had tachycardia with an episode of aflutter that resolved with cardizem 10 mg x2  - continue sotalol 40 mg PO TID  - continue with telemetry monitoring; pt was tachycardia overnight.  - continue cardizem to 60mg

## 2017-07-26 NOTE — PROGRESS NOTE ADULT - SUBJECTIVE AND OBJECTIVE BOX
ANDRES CRAWFORD  67y  Male      Patient is a 67y old  Male who presents with a chief complaint of SOB and pleuritic chest pain (2017 01:17)      INTERVAL HPI/OVERNIGHT EVENTS:  Pt was seen and examined at bedside. Pt on tele monitoring.  Overnight sotolol was given at 40mg. Patient tachycardia 130s-140s overnight. He continues to have a nonproductive cough, no difficulty  breathing, no chest pain, no palpitations; no nausea, vomiting, diarrhea, constipation. No fevers or chills.    REVIEW OF SYSTEMS:  CONSTITUTIONAL: No fever, weight loss, or fatigue, no fevers, no chills  RESPIRATORY: No cough, wheezing; no SOB  CARDIOVASCULAR: No chest pain, palpitations, dizziness  GASTROINTESTINAL: No abdominal or epigastric pain. No nausea, vomiting; No diarrhea or constipation.  NEUROLOGICAL: No headaches,   MUSCULOSKELETAL: No muscle, back, or extremity pain  PSYCHIATRIC: No difficulty sleeping      T(C): 36.6 (17 @ 22:03), Max: 37.1 (17 @ 09:01)  HR: 128 (17 @ 00:16) (120 - 128)  BP: 126/91 (17 @ 00:16) (104/66 - 153/94)  RR: 18 (17 @ 22:03) (17 - 20)  SpO2: 99% (17 @ 22:03) (97% - 100%)  Wt(kg): --Vital Signs Last 24 Hrs  T(C): 36.6 (2017 22:03), Max: 37.1 (2017 09:01)  T(F): 97.9 (2017 22:03), Max: 98.7 (2017 09:01)  HR: 128 (2017 00:16) (120 - 128)  BP: 126/91 (2017 00:16) (104/66 - 153/94)  BP(mean): --  RR: 18 (2017 22:03) (17 - 20)  SpO2: 99% (2017 22:03) (97% - 100%)  Wt(kg): --    PHYSICAL EXAM:  GENERAL: NAD, well-groomed, well-developed, comfortable  HEAD:  Atraumatic, Normocephalic  NERVOUS SYSTEM:  Alert & Oriented X3, Good concentration  CHEST/LUNG: Rhonchi LLL  HEART: tachy; No murmurs, rubs, or gallops  ABDOMEN: Soft, Nontender, Nondistended  EXTREMITIES:   +1 edema b/l lower extremity  LYMPH: No lymphadenopathy noted  SKIN: erythematous macular rash consistent with venous stasis changes on lower extremities      Consultant(s) Notes Reviewed:  [x ] YES  [ ] NO  Care Discussed with Consultants/Other Providers [ x] YES  [ ] NO    LABS:                        10.1   3.53  )-----------( 107      ( 2017 06:40 )             31.3     07-25    140  |  103  |  15  ----------------------------<  109<H>  3.9   |  24  |  0.94    Ca    8.3<L>      2017 06:40  Phos  2.5       Mg     2.0         TPro  5.5<L>  /  Alb  3.4  /  TBili  0.3  /  DBili  x   /  AST  24  /  ALT  37  /  AlkPhos  134<H>  07-23    PT/INR - ( 2017 16:15 )   PT: 14.7 SEC;   INR: 1.31          PTT - ( 2017 05:45 )  PTT:27.5 SEC  Urinalysis Basic - ( 2017 17:30 )    Color: PLYEL / Appearance: CLEAR / S.012 / pH: 7.5  Gluc: 300 / Ketone: NEGATIVE  / Bili: NEGATIVE / Urobili: NORMAL E.U.   Blood: NEGATIVE / Protein: 20 / Nitrite: NEGATIVE   Leuk Esterase: NEGATIVE / RBC: 0-2 / WBC 0-2   Sq Epi: x / Non Sq Epi: x / Bacteria: x      CAPILLARY BLOOD GLUCOSE  143 (2017 08:23)  145 (2017 22:09)  143 (2017 17:59)            Urinalysis Basic - ( 2017 17:30 )    Color: PLYEL / Appearance: CLEAR / S.012 / pH: 7.5  Gluc: 300 / Ketone: NEGATIVE  / Bili: NEGATIVE / Urobili: NORMAL E.U.   Blood: NEGATIVE / Protein: 20 / Nitrite: NEGATIVE   Leuk Esterase: NEGATIVE / RBC: 0-2 / WBC 0-2   Sq Epi: x / Non Sq Epi: x / Bacteria: x        RADIOLOGY & ADDITIONAL TESTS:    Imaging Personally Reviewed:  [ ] YES  [ ] NO    HEALTH ISSUES - PROBLEM Dx:  Thrombocytopenia: Thrombocytopenia  Gastric cancer: Gastric cancer  Need for prophylactic measure: Need for prophylactic measure  COPD (chronic obstructive pulmonary disease): COPD (chronic obstructive pulmonary disease)  Hepatitis B: Hepatitis B  Type 2 diabetes mellitus: Type 2 diabetes mellitus  GERD (gastroesophageal reflux disease): GERD (gastroesophageal reflux disease)  Atrial fibrillation: Atrial fibrillation  Pneumonia of both lungs due to infectious organism, unspecified part of lung: Pneumonia of both lungs due to infectious organism, unspecified part of lung ANDRES CRAWFORD  67y  Male      Patient is a 67y old  Male who presents with a chief complaint of SOB and pleuritic chest pain (2017 01:17)      INTERVAL HPI/OVERNIGHT EVENTS:  Pt was seen and examined at bedside. Pt on tele monitoring.  Overnight sotolol was given at 40mg. Patient tachycardia 130s-140s overnight. He continues to have a nonproductive cough, no difficulty  breathing, no chest pain, no palpitations; no nausea, vomiting, diarrhea, constipation. No fevers or chills.    REVIEW OF SYSTEMS:  CONSTITUTIONAL: No fever, weight loss, or fatigue, no fevers, no chills  RESPIRATORY: No cough, wheezing; no SOB  CARDIOVASCULAR: No chest pain, palpitations, dizziness  GASTROINTESTINAL: No abdominal or epigastric pain. No nausea, vomiting; No diarrhea or constipation.  NEUROLOGICAL: No headaches,   MUSCULOSKELETAL: No muscle, back, or extremity pain  PSYCHIATRIC: No difficulty sleeping      T(C): 36.6 (17 @ 22:03), Max: 37.1 (17 @ 09:01)  HR: 128 (17 @ 00:16) (120 - 128)  BP: 126/91 (17 @ 00:16) (104/66 - 153/94)  RR: 18 (17 @ 22:03) (17 - 20)  SpO2: 99% (17 @ 22:03) (97% - 100%)  Wt(kg): --Vital Signs Last 24 Hrs  T(C): 36.6 (2017 22:03), Max: 37.1 (2017 09:01)  T(F): 97.9 (2017 22:03), Max: 98.7 (2017 09:01)  HR: 128 (2017 00:16) (120 - 128)  BP: 126/91 (2017 00:16) (104/66 - 153/94)  BP(mean): --  RR: 18 (2017 22:03) (17 - 20)  SpO2: 99% (2017 22:03) (97% - 100%) on RA  Wt(kg): --    PHYSICAL EXAM:  GENERAL: NAD, well-groomed, well-developed, comfortable  HEAD:  Atraumatic, Normocephalic  NERVOUS SYSTEM:  Alert & Oriented X3, Good concentration  CHEST/LUNG: Rhonchi LLL  HEART: tachy; No murmurs, rubs, or gallops  ABDOMEN: Soft, Nontender, Nondistended  EXTREMITIES:   +1 edema b/l lower extremity  LYMPH: No lymphadenopathy noted  SKIN: erythematous macular rash consistent with venous stasis changes on lower extremities      Consultant(s) Notes Reviewed:  [x ] YES  [ ] NO  Care Discussed with Consultants/Other Providers [ x] YES  [ ] NO    LABS:                        10.1   3.53  )-----------( 107      ( 2017 06:40 )             31.3     07-25    140  |  103  |  15  ----------------------------<  109<H>  3.9   |  24  |  0.94    Ca    8.3<L>      2017 06:40  Phos  2.5     07-25  Mg     2.0     -25    TPro  5.5<L>  /  Alb  3.4  /  TBili  0.3  /  DBili  x   /  AST  24  /  ALT  37  /  AlkPhos  134<H>  07-23    PT/INR - ( 2017 16:15 )   PT: 14.7 SEC;   INR: 1.31          PTT - ( 2017 05:45 )  PTT:27.5 SEC  Urinalysis Basic - ( 2017 17:30 )    Color: PLYEL / Appearance: CLEAR / S.012 / pH: 7.5  Gluc: 300 / Ketone: NEGATIVE  / Bili: NEGATIVE / Urobili: NORMAL E.U.   Blood: NEGATIVE / Protein: 20 / Nitrite: NEGATIVE   Leuk Esterase: NEGATIVE / RBC: 0-2 / WBC 0-2   Sq Epi: x / Non Sq Epi: x / Bacteria: x      CAPILLARY BLOOD GLUCOSE  143 (2017 08:23)  145 (2017 22:09)  143 (2017 17:59)            Urinalysis Basic - ( 2017 17:30 )    Color: PLYEL / Appearance: CLEAR / S.012 / pH: 7.5  Gluc: 300 / Ketone: NEGATIVE  / Bili: NEGATIVE / Urobili: NORMAL E.U.   Blood: NEGATIVE / Protein: 20 / Nitrite: NEGATIVE   Leuk Esterase: NEGATIVE / RBC: 0-2 / WBC 0-2   Sq Epi: x / Non Sq Epi: x / Bacteria: x        RADIOLOGY & ADDITIONAL TESTS:    Imaging Personally Reviewed:  [ ] YES  [ ] NO    HEALTH ISSUES - PROBLEM Dx:  Thrombocytopenia: Thrombocytopenia  Gastric cancer: Gastric cancer  Need for prophylactic measure: Need for prophylactic measure  COPD (chronic obstructive pulmonary disease): COPD (chronic obstructive pulmonary disease)  Hepatitis B: Hepatitis B  Type 2 diabetes mellitus: Type 2 diabetes mellitus  GERD (gastroesophageal reflux disease): GERD (gastroesophageal reflux disease)  Atrial fibrillation: Atrial fibrillation  Pneumonia of both lungs due to infectious organism, unspecified part of lung: Pneumonia of both lungs due to infectious organism, unspecified part of lung

## 2017-07-26 NOTE — DISCHARGE NOTE ADULT - PATIENT PORTAL LINK FT
“You can access the [facility] Patient Portal, offered by [facility], by registering with the following website: [email or web address]”

## 2017-07-26 NOTE — DISCHARGE NOTE ADULT - MEDICATION SUMMARY - MEDICATIONS TO TAKE
I will START or STAY ON the medications listed below when I get home from the hospital:    aspirin 81 mg oral delayed release capsule  -- 1 tab(s) by mouth once a day  -- Indication: For Atrial fibrillation    Flomax 0.4 mg oral capsule  -- 1 cap(s) by mouth once a day  -- Indication: For Atrial fibrillation    sotalol 80 mg oral tablet  -- 1 tab(s) by mouth 2 times a day  -- It is very important that you take or use this exactly as directed.  Do not skip doses or discontinue unless directed by your doctor.  May cause drowsiness.  Alcohol may intensify this effect.  Use care when operating dangerous machinery.  Some non-prescription drugs may aggravate your condition.  Read all labels carefully.  If a warning appears, check with your doctor before taking.    -- Indication: For Atrial fibrillation    Cardizem  mg/24 hours oral capsule, extended release  -- 1 cap(s) by mouth once a day  -- It is very important that you take or use this exactly as directed.  Do not skip doses or discontinue unless directed by your doctor.  Some non-prescription drugs may aggravate your condition.  Read all labels carefully.  If a warning appears, check with your doctor before taking.  Swallow whole.  Do not crush.    -- Indication: For Atrial fibrillation    sertraline 50 mg oral tablet  -- 1 tab(s) by mouth once a day  -- Indication: For Gastric cancer    Januvia 100 mg oral tablet  -- 1 tab(s) by mouth once a day  -- Indication: For Type 2 diabetes mellitus    repaglinide 1 mg oral tablet  -- 1 tab(s) by mouth 3 times a day (before meals)  -- Indication: For Type 2 diabetes mellitus    metFORMIN 500 mg oral tablet  -- 1 tab(s) by mouth 2 times a day  -- Indication: For Type 2 diabetes mellitus    Lipitor 20 mg oral tablet  -- 1 tab(s) by mouth once a day  -- Indication: For Hyperlipidemia    entecavir 0.5 mg oral tablet  -- 1 tab(s) by mouth once a day  -- Indication: For Hepatitis B    tenofovir 25 mg oral tablet  -- 1 tab(s) by mouth once a day  -- Indication: For Hepatitis B    Spiriva 18 mcg inhalation capsule  -- 1 cap(s) inhaled once a day  -- Indication: For COPD (chronic obstructive pulmonary disease)    Symbicort 160 mcg-4.5 mcg/inh inhalation aerosol  -- 2 puff(s) inhaled 2 times a day  -- Indication: For COPD (chronic obstructive pulmonary disease)    Creon 24,000 units oral delayed release capsule  -- 1 cap(s) by mouth 3 times a day  -- Indication: For Gastric cancer    Namenda XR 28 mg oral capsule, extended release  -- 1 cap(s) by mouth once a day  -- Indication: For Memory loss    Restasis 0.05% ophthalmic emulsion  -- 1 drop(s) to each affected eye every 12 hours  -- Indication: For dry eyes    dexlansoprazole 60 mg oral delayed release capsule  -- 1 cap(s) by mouth once a day  -- Indication: For Gastric cancer    Levaquin 750 mg oral tablet  -- 1 tab(s) by mouth once a day  -- Avoid prolonged or excessive exposure to direct and/or artificial sunlight while taking this medication.  Do not take dairy products, antacids, or iron preparations within one hour of this medication.  Finish all this medication unless otherwise directed by prescriber.  May cause drowsiness or dizziness.  Medication should be taken with plenty of water.    -- Indication: For Pneumonia

## 2017-07-26 NOTE — DISCHARGE NOTE ADULT - PLAN OF CARE
You should be feeling better after taking course of antibiotics. If you are experiencing any shortness of breath, fevers, chills, or cough after completing levofloxacin, please follow up with you primary medical doctor. If you are experiencing worsening shortness of breath, fevers, chills, or cough after completing levofloxacin, please follow up with you primary medical doctor. Continue your medications for atrial fibrillation. If you are experiencing any shortness of breath, palpitations, or dizziness, please follow up with you primary medical doctor. If you are experiencing any difficulty breathing, please follow up with your primary medical doctor. Please follow with your oncologist. Please follow up with you hepatologist. Your platelet count was low during your stay. If you feel weakness, dizziness, or are bleeding; please follow up with your primary medical doctor. You be breathing better after completing antiobiotic course. Continue to take your antiviral therapy. If you are experiencing worsening shortness of breath, fevers, chills, or cough after completing levofloxacin, please follow up with you primary medical doctor or go to the ED as soon as possible. If you are experiencing any shortness of breath, palpitations, or dizziness, please follow up with you primary medical doctor. Please make an appointment with Dr. Nelson less than a week upon discharge. If you are experiencing any difficulty breathing, please follow up with your primary medical doctor. Continue taking your inhaler therapy as indicated for you. Complete antibiotic course

## 2017-07-26 NOTE — DISCHARGE NOTE ADULT - CARE PLAN
Principal Discharge DX:	Pneumonia of both lungs due to infectious organism, unspecified part of lung  Goal:	You should be feeling better after taking course of antibiotics. If you are experiencing any shortness of breath, fevers, chills, or cough after completing levofloxacin, please follow up with you primary medical doctor.  Secondary Diagnosis:	Atrial fibrillation  Secondary Diagnosis:	COPD (chronic obstructive pulmonary disease)  Secondary Diagnosis:	Gastric cancer  Secondary Diagnosis:	Hepatitis B  Secondary Diagnosis:	Thrombocytopenia Principal Discharge DX:	Pneumonia of both lungs due to infectious organism, unspecified part of lung  Goal:	You be breathing better after completing antiobiotic course.  Instructions for follow-up, activity and diet:	If you are experiencing worsening shortness of breath, fevers, chills, or cough after completing levofloxacin, please follow up with you primary medical doctor.  Secondary Diagnosis:	Atrial fibrillation  Goal:	Continue your medications for atrial fibrillation.  Instructions for follow-up, activity and diet:	If you are experiencing any shortness of breath, palpitations, or dizziness, please follow up with you primary medical doctor.  Secondary Diagnosis:	COPD (chronic obstructive pulmonary disease)  Instructions for follow-up, activity and diet:	If you are experiencing any difficulty breathing, please follow up with your primary medical doctor.  Secondary Diagnosis:	Gastric cancer  Instructions for follow-up, activity and diet:	Please follow with your oncologist.  Secondary Diagnosis:	Hepatitis B  Goal:	Please follow up with you hepatologist.  Secondary Diagnosis:	Thrombocytopenia  Goal:	Your platelet count was low during your stay. If you feel weakness, dizziness, or are bleeding; please follow up with your primary medical doctor. Principal Discharge DX:	Pneumonia of both lungs due to infectious organism, unspecified part of lung  Goal:	Complete antibiotic course  Instructions for follow-up, activity and diet:	If you are experiencing worsening shortness of breath, fevers, chills, or cough after completing levofloxacin, please follow up with you primary medical doctor or go to the ED as soon as possible.  Secondary Diagnosis:	Atrial fibrillation  Goal:	Continue your medications for atrial fibrillation.  Instructions for follow-up, activity and diet:	If you are experiencing any shortness of breath, palpitations, or dizziness, please follow up with you primary medical doctor. Please make an appointment with Dr. Nelson less than a week upon discharge.  Secondary Diagnosis:	COPD (chronic obstructive pulmonary disease)  Instructions for follow-up, activity and diet:	If you are experiencing any difficulty breathing, please follow up with your primary medical doctor. Continue taking your inhaler therapy as indicated for you.  Secondary Diagnosis:	Gastric cancer  Instructions for follow-up, activity and diet:	Please follow with your oncologist.  Secondary Diagnosis:	Hepatitis B  Goal:	Please follow up with you hepatologist.  Instructions for follow-up, activity and diet:	Continue to take your antiviral therapy.  Secondary Diagnosis:	Thrombocytopenia  Goal:	Your platelet count was low during your stay. If you feel weakness, dizziness, or are bleeding; please follow up with your primary medical doctor.

## 2017-07-26 NOTE — PROGRESS NOTE ADULT - ASSESSMENT
67 year-old Slovak-speaking man with afib s/p watchman not on anticoagulation, T2DM not on insulin, GERD, thyroid nodules, COPD, per documentation had hep B exposure on antiviral therapy and with poorly differentiated gastric cancer T3N2M0 on chemotherapy admitted with bibasilar pneumonia. 67 year-old Swiss-speaking man with afib s/p watchman not on anticoagulation, T2DM not on insulin, GERD, thyroid nodules, COPD, per documentation had hep B exposure on antiviral therapy and with poorly differentiated gastric cancer T3N2M0 on chemotherapy admitted with bibasilar pneumonia that is clinically improving on antibiotics.

## 2017-07-26 NOTE — DISCHARGE NOTE ADULT - MEDICATION SUMMARY - MEDICATIONS TO CHANGE
I will SWITCH the dose or number of times a day I take the medications listed below when I get home from the hospital:    dilTIAZem 120 mg/24 hours oral capsule, extended release  -- 1 cap(s) by mouth once a day    sotalol 80 mg oral tablet  -- 40 milligram(s) by mouth 3 times a day

## 2017-07-26 NOTE — DISCHARGE NOTE ADULT - NS AS ACTIVITY OBS
Driving allowed/Walking-Outdoors allowed/Stairs allowed/Return to Work/School allowed/Showering allowed/Sex allowed/Walking-Indoors allowed/Bathing allowed

## 2017-07-26 NOTE — DISCHARGE NOTE ADULT - SECONDARY DIAGNOSIS.
Atrial fibrillation COPD (chronic obstructive pulmonary disease) Gastric cancer Hepatitis B Thrombocytopenia

## 2017-07-28 LAB
-  AMPICILLIN: SIGNIFICANT CHANGE UP
-  CIPROFLOXACIN: SIGNIFICANT CHANGE UP
-  NITROFURANTOIN: SIGNIFICANT CHANGE UP
-  TETRACYCLINE: SIGNIFICANT CHANGE UP
-  VANCOMYCIN: SIGNIFICANT CHANGE UP
BACTERIA BLD CULT: SIGNIFICANT CHANGE UP
BACTERIA BLD CULT: SIGNIFICANT CHANGE UP
BACTERIA UR CULT: SIGNIFICANT CHANGE UP
METHOD TYPE: SIGNIFICANT CHANGE UP
ORGANISM # SPEC MICROSCOPIC CNT: SIGNIFICANT CHANGE UP
ORGANISM # SPEC MICROSCOPIC CNT: SIGNIFICANT CHANGE UP

## 2017-07-31 ENCOUNTER — APPOINTMENT (OUTPATIENT)
Dept: SURGICAL ONCOLOGY | Facility: CLINIC | Age: 67
End: 2017-07-31
Payer: MEDICARE

## 2017-07-31 VITALS
BODY MASS INDEX: 31.39 KG/M2 | SYSTOLIC BLOOD PRESSURE: 112 MMHG | DIASTOLIC BLOOD PRESSURE: 75 MMHG | WEIGHT: 200 LBS | HEIGHT: 67 IN | HEART RATE: 99 BPM | OXYGEN SATURATION: 95 %

## 2017-07-31 PROCEDURE — 99215 OFFICE O/P EST HI 40 MIN: CPT

## 2017-08-21 ENCOUNTER — RESULT REVIEW (OUTPATIENT)
Age: 67
End: 2017-08-21

## 2017-08-21 ENCOUNTER — OUTPATIENT (OUTPATIENT)
Dept: OUTPATIENT SERVICES | Facility: HOSPITAL | Age: 67
LOS: 1 days | End: 2017-08-21
Payer: MEDICARE

## 2017-08-21 ENCOUNTER — APPOINTMENT (OUTPATIENT)
Dept: GASTROENTEROLOGY | Facility: HOSPITAL | Age: 67
End: 2017-08-21

## 2017-08-21 DIAGNOSIS — C16.9 MALIGNANT NEOPLASM OF STOMACH, UNSPECIFIED: ICD-10-CM

## 2017-08-21 PROCEDURE — 88305 TISSUE EXAM BY PATHOLOGIST: CPT | Mod: 26

## 2017-08-21 PROCEDURE — 43239 EGD BIOPSY SINGLE/MULTIPLE: CPT | Mod: GC

## 2017-08-21 PROCEDURE — 88312 SPECIAL STAINS GROUP 1: CPT

## 2017-08-21 PROCEDURE — 88312 SPECIAL STAINS GROUP 1: CPT | Mod: 26

## 2017-08-21 PROCEDURE — 43239 EGD BIOPSY SINGLE/MULTIPLE: CPT

## 2017-08-21 PROCEDURE — 88305 TISSUE EXAM BY PATHOLOGIST: CPT

## 2017-08-22 ENCOUNTER — OUTPATIENT (OUTPATIENT)
Dept: OUTPATIENT SERVICES | Facility: HOSPITAL | Age: 67
LOS: 1 days | End: 2017-08-22
Payer: MEDICARE

## 2017-08-22 VITALS
HEART RATE: 56 BPM | TEMPERATURE: 98 F | RESPIRATION RATE: 16 BRPM | WEIGHT: 197.98 LBS | SYSTOLIC BLOOD PRESSURE: 104 MMHG | DIASTOLIC BLOOD PRESSURE: 66 MMHG | OXYGEN SATURATION: 98 % | HEIGHT: 67 IN

## 2017-08-22 DIAGNOSIS — C16.9 MALIGNANT NEOPLASM OF STOMACH, UNSPECIFIED: ICD-10-CM

## 2017-08-22 DIAGNOSIS — I48.91 UNSPECIFIED ATRIAL FIBRILLATION: ICD-10-CM

## 2017-08-22 DIAGNOSIS — Z90.89 ACQUIRED ABSENCE OF OTHER ORGANS: Chronic | ICD-10-CM

## 2017-08-22 DIAGNOSIS — Z98.890 OTHER SPECIFIED POSTPROCEDURAL STATES: Chronic | ICD-10-CM

## 2017-08-22 DIAGNOSIS — E11.9 TYPE 2 DIABETES MELLITUS WITHOUT COMPLICATIONS: ICD-10-CM

## 2017-08-22 LAB
ALBUMIN SERPL ELPH-MCNC: 4.1 G/DL — SIGNIFICANT CHANGE UP (ref 3.3–5)
ALP SERPL-CCNC: 105 U/L — SIGNIFICANT CHANGE UP (ref 40–120)
ALT FLD-CCNC: 27 U/L — SIGNIFICANT CHANGE UP (ref 4–41)
AST SERPL-CCNC: 19 U/L — SIGNIFICANT CHANGE UP (ref 4–40)
BILIRUB SERPL-MCNC: 0.3 MG/DL — SIGNIFICANT CHANGE UP (ref 0.2–1.2)
BLD GP AB SCN SERPL QL: NEGATIVE — SIGNIFICANT CHANGE UP
BUN SERPL-MCNC: 18 MG/DL — SIGNIFICANT CHANGE UP (ref 7–23)
CALCIUM SERPL-MCNC: 9.4 MG/DL — SIGNIFICANT CHANGE UP (ref 8.4–10.5)
CEA SERPL-MCNC: 2 NG/ML — SIGNIFICANT CHANGE UP (ref 0.2–3.8)
CHLORIDE SERPL-SCNC: 106 MMOL/L — SIGNIFICANT CHANGE UP (ref 98–107)
CO2 SERPL-SCNC: 25 MMOL/L — SIGNIFICANT CHANGE UP (ref 22–31)
CREAT SERPL-MCNC: 1.04 MG/DL — SIGNIFICANT CHANGE UP (ref 0.5–1.3)
GLUCOSE SERPL-MCNC: 78 MG/DL — SIGNIFICANT CHANGE UP (ref 70–99)
HBA1C BLD-MCNC: 6.3 % — HIGH (ref 4–5.6)
HCT VFR BLD CALC: 36.9 % — LOW (ref 39–50)
HGB BLD-MCNC: 11.4 G/DL — LOW (ref 13–17)
MCHC RBC-ENTMCNC: 27.2 PG — SIGNIFICANT CHANGE UP (ref 27–34)
MCHC RBC-ENTMCNC: 30.9 % — LOW (ref 32–36)
MCV RBC AUTO: 88.1 FL — SIGNIFICANT CHANGE UP (ref 80–100)
NRBC # FLD: 0 — SIGNIFICANT CHANGE UP
PLATELET # BLD AUTO: 185 K/UL — SIGNIFICANT CHANGE UP (ref 150–400)
PMV BLD: 12.4 FL — SIGNIFICANT CHANGE UP (ref 7–13)
POTASSIUM SERPL-MCNC: 4.7 MMOL/L — SIGNIFICANT CHANGE UP (ref 3.5–5.3)
POTASSIUM SERPL-SCNC: 4.7 MMOL/L — SIGNIFICANT CHANGE UP (ref 3.5–5.3)
PROT SERPL-MCNC: 6.5 G/DL — SIGNIFICANT CHANGE UP (ref 6–8.3)
RBC # BLD: 4.19 M/UL — LOW (ref 4.2–5.8)
RBC # FLD: 20.4 % — HIGH (ref 10.3–14.5)
RH IG SCN BLD-IMP: POSITIVE — SIGNIFICANT CHANGE UP
SODIUM SERPL-SCNC: 144 MMOL/L — SIGNIFICANT CHANGE UP (ref 135–145)
WBC # BLD: 5.31 K/UL — SIGNIFICANT CHANGE UP (ref 3.8–10.5)
WBC # FLD AUTO: 5.31 K/UL — SIGNIFICANT CHANGE UP (ref 3.8–10.5)

## 2017-08-22 PROCEDURE — 93010 ELECTROCARDIOGRAM REPORT: CPT

## 2017-08-22 RX ORDER — REPAGLINIDE 1 MG/1
1 TABLET ORAL
Qty: 0 | Refills: 0 | COMMUNITY

## 2017-08-22 RX ORDER — SODIUM CHLORIDE 9 MG/ML
1000 INJECTION, SOLUTION INTRAVENOUS
Qty: 0 | Refills: 0 | Status: DISCONTINUED | OUTPATIENT
Start: 2017-09-05 | End: 2017-09-07

## 2017-08-22 RX ORDER — SERTRALINE 25 MG/1
1 TABLET, FILM COATED ORAL
Qty: 0 | Refills: 0 | COMMUNITY

## 2017-08-22 RX ORDER — ENTECAVIR 0.5 MG/1
1 TABLET ORAL
Qty: 0 | Refills: 0 | COMMUNITY

## 2017-08-22 RX ORDER — TIOTROPIUM BROMIDE 18 UG/1
1 CAPSULE ORAL; RESPIRATORY (INHALATION)
Qty: 0 | Refills: 0 | COMMUNITY

## 2017-08-22 RX ORDER — TAMSULOSIN HYDROCHLORIDE 0.4 MG/1
1 CAPSULE ORAL
Qty: 0 | Refills: 0 | COMMUNITY

## 2017-08-22 RX ORDER — ATORVASTATIN CALCIUM 80 MG/1
1 TABLET, FILM COATED ORAL
Qty: 0 | Refills: 0 | COMMUNITY

## 2017-08-22 RX ORDER — DEXLANSOPRAZOLE 30 MG/1
1 CAPSULE, DELAYED RELEASE ORAL
Qty: 0 | Refills: 0 | COMMUNITY

## 2017-08-22 RX ORDER — MEMANTINE HYDROCHLORIDE 10 MG/1
1 TABLET ORAL
Qty: 0 | Refills: 0 | COMMUNITY

## 2017-08-22 RX ORDER — BUDESONIDE AND FORMOTEROL FUMARATE DIHYDRATE 160; 4.5 UG/1; UG/1
2 AEROSOL RESPIRATORY (INHALATION)
Qty: 0 | Refills: 0 | COMMUNITY

## 2017-08-22 RX ORDER — ASPIRIN/CALCIUM CARB/MAGNESIUM 324 MG
1 TABLET ORAL
Qty: 0 | Refills: 0 | COMMUNITY

## 2017-08-22 RX ORDER — SITAGLIPTIN 50 MG/1
1 TABLET, FILM COATED ORAL
Qty: 0 | Refills: 0 | COMMUNITY

## 2017-08-22 RX ORDER — LIPASE/PROTEASE/AMYLASE 16-48-48K
1 CAPSULE,DELAYED RELEASE (ENTERIC COATED) ORAL
Qty: 0 | Refills: 0 | COMMUNITY

## 2017-08-22 RX ORDER — TENOFOVIR DISOPROXIL FUMARATE 300 MG/1
1 TABLET, FILM COATED ORAL
Qty: 0 | Refills: 0 | COMMUNITY

## 2017-08-22 NOTE — H&P PST ADULT - ASSESSMENT
[age] year old male presents with gastric cancer scheduled for an endoscopy, robotic assisted gastrectomy, possible total gastrectomy and jejunostomy tube placement on 9/5/17.

## 2017-08-22 NOTE — H&P PST ADULT - HISTORY OF PRESENT ILLNESS
67 year old male with a history of hypertension, diabetes, A-fib, 67 year old male with a history of hypertension, diabetes, A-fib, COPD, Hep B, dyslipidemia, and BPH presents with gastric cancer diagnosed 3 months ago. Mediport placed and chemo was started. Patient is now scheduled for an upper endoscopy, robotic assisted gastrectomy, possible total gastrectomy and feeding jejunostomy on 9/5/17.

## 2017-08-22 NOTE — H&P PST ADULT - PMH
Diabetes    DVT (deep venous thrombosis)    Dyslipidemia    Gastric cancer    History of cholecystectomy    History of hypertension Afib    BPH (benign prostatic hyperplasia)    COPD (chronic obstructive pulmonary disease)    Diabetes    DVT (deep venous thrombosis)    Dyslipidemia    Gastric cancer    Hepatitis B    History of cholecystectomy    History of hypertension    Memory change

## 2017-08-22 NOTE — H&P PST ADULT - NSANTHOSAYNRD_GEN_A_CORE
No. OLAMIDE screening performed.  STOP BANG Legend: 0-2 = LOW Risk; 3-4 = INTERMEDIATE Risk; 5-8 = HIGH Risk

## 2017-08-22 NOTE — H&P PST ADULT - VENOUS THROMBOEMBOLISM HISTORY
previous malignancy serious lung disease including pneumonia (less than 1 mo ago)/previous malignancy

## 2017-08-22 NOTE — H&P PST ADULT - GASTROINTESTINAL DETAILS
no rigidity/nontender/no masses palpable/no rebound tenderness/no distention/+ hernia/bowel sounds normal/soft/no guarding

## 2017-08-22 NOTE — H&P PST ADULT - PROBLEM SELECTOR PLAN 1
CBC, CMP, T&S, CEA pending.  CHG Wash given to patient with instructions.  Medical and cardiology clearance done as per patient's daughter-in-law-will request a copy for the chart.

## 2017-08-22 NOTE — H&P PST ADULT - SOURCE OF INFORMATION, PROFILE
patient patient/family/patient accompanied by his daughter in law and requested her to interpret. Patient understands English but has difficulty responding.

## 2017-08-22 NOTE — H&P PST ADULT - ATTENDING COMMENTS
Planned procedure including risks and benefits discussed with patient.    Past Medical History:  Afib    BPH (benign prostatic hyperplasia)    COPD (chronic obstructive pulmonary disease)    Diabetes    DVT (deep venous thrombosis)    Dyslipidemia    Gastric cancer    Hepatitis B    History of cholecystectomy    History of hypertension    Memory change.    Past Surgical History:  History of embolic filter insertion  June 2017  History of tonsillectomy.

## 2017-08-24 ENCOUNTER — APPOINTMENT (OUTPATIENT)
Dept: GASTROENTEROLOGY | Facility: HOSPITAL | Age: 67
End: 2017-08-24

## 2017-08-31 ENCOUNTER — CHART COPY (OUTPATIENT)
Age: 67
End: 2017-08-31

## 2017-09-01 NOTE — ASU PATIENT PROFILE, ADULT - PMH
Afib    BPH (benign prostatic hyperplasia)    COPD (chronic obstructive pulmonary disease)    Diabetes    DVT (deep venous thrombosis)    Dyslipidemia    Gastric cancer    Hepatitis B    History of cholecystectomy    History of hypertension    Memory change Afib    BPH (benign prostatic hyperplasia)    COPD (chronic obstructive pulmonary disease)    Diabetes    Dyslipidemia    Gastric cancer  chemo, has infuspaort  Hepatitis B    History of hypertension    Memory change

## 2017-09-05 ENCOUNTER — INPATIENT (INPATIENT)
Facility: HOSPITAL | Age: 67
LOS: 8 days | Discharge: HOME HEALTH | End: 2017-09-14
Attending: SPECIALIST | Admitting: SPECIALIST
Payer: MEDICARE

## 2017-09-05 ENCOUNTER — RESULT REVIEW (OUTPATIENT)
Age: 67
End: 2017-09-05

## 2017-09-05 ENCOUNTER — APPOINTMENT (OUTPATIENT)
Dept: SURGICAL ONCOLOGY | Facility: HOSPITAL | Age: 67
End: 2017-09-05

## 2017-09-05 VITALS
SYSTOLIC BLOOD PRESSURE: 133 MMHG | DIASTOLIC BLOOD PRESSURE: 86 MMHG | HEART RATE: 71 BPM | OXYGEN SATURATION: 98 % | RESPIRATION RATE: 16 BRPM | HEIGHT: 67 IN | TEMPERATURE: 99 F | WEIGHT: 197.98 LBS

## 2017-09-05 DIAGNOSIS — Z98.890 OTHER SPECIFIED POSTPROCEDURAL STATES: Chronic | ICD-10-CM

## 2017-09-05 DIAGNOSIS — C16.9 MALIGNANT NEOPLASM OF STOMACH, UNSPECIFIED: ICD-10-CM

## 2017-09-05 DIAGNOSIS — Z90.89 ACQUIRED ABSENCE OF OTHER ORGANS: Chronic | ICD-10-CM

## 2017-09-05 LAB
BASE EXCESS BLDA CALC-SCNC: 1.1 MMOL/L — SIGNIFICANT CHANGE UP
BUN SERPL-MCNC: 15 MG/DL — SIGNIFICANT CHANGE UP (ref 7–23)
CA-I BLDA-SCNC: 1.19 MMOL/L — SIGNIFICANT CHANGE UP (ref 1.15–1.29)
CALCIUM SERPL-MCNC: 8.6 MG/DL — SIGNIFICANT CHANGE UP (ref 8.4–10.5)
CHLORIDE SERPL-SCNC: 102 MMOL/L — SIGNIFICANT CHANGE UP (ref 98–107)
CO2 SERPL-SCNC: 25 MMOL/L — SIGNIFICANT CHANGE UP (ref 22–31)
CREAT SERPL-MCNC: 0.91 MG/DL — SIGNIFICANT CHANGE UP (ref 0.5–1.3)
GLUCOSE BLDA-MCNC: 84 MG/DL — SIGNIFICANT CHANGE UP (ref 70–99)
GLUCOSE SERPL-MCNC: 125 MG/DL — HIGH (ref 70–99)
HCO3 BLDA-SCNC: 26 MMOL/L — SIGNIFICANT CHANGE UP (ref 22–26)
HCT VFR BLD CALC: 39.2 % — SIGNIFICANT CHANGE UP (ref 39–50)
HCT VFR BLDA CALC: 34.7 % — LOW (ref 39–51)
HGB BLD-MCNC: 12.2 G/DL — LOW (ref 13–17)
HGB BLDA-MCNC: 11.2 G/DL — LOW (ref 13–17)
LACTATE SERPL-SCNC: 2 MMOL/L — SIGNIFICANT CHANGE UP (ref 0.5–2)
MAGNESIUM SERPL-MCNC: 1.3 MG/DL — LOW (ref 1.6–2.6)
MCHC RBC-ENTMCNC: 26.8 PG — LOW (ref 27–34)
MCHC RBC-ENTMCNC: 31.1 % — LOW (ref 32–36)
MCV RBC AUTO: 86.2 FL — SIGNIFICANT CHANGE UP (ref 80–100)
NRBC # FLD: 0 — SIGNIFICANT CHANGE UP
PCO2 BLDA: 37 MMHG — SIGNIFICANT CHANGE UP (ref 35–48)
PH BLDA: 7.44 PH — SIGNIFICANT CHANGE UP (ref 7.35–7.45)
PHOSPHATE SERPL-MCNC: 3.2 MG/DL — SIGNIFICANT CHANGE UP (ref 2.5–4.5)
PLATELET # BLD AUTO: 154 K/UL — SIGNIFICANT CHANGE UP (ref 150–400)
PMV BLD: 10.4 FL — SIGNIFICANT CHANGE UP (ref 7–13)
PO2 BLDA: 500 MMHG — HIGH (ref 83–108)
POTASSIUM BLDA-SCNC: 4 MMOL/L — SIGNIFICANT CHANGE UP (ref 3.4–4.5)
POTASSIUM SERPL-MCNC: 4.1 MMOL/L — SIGNIFICANT CHANGE UP (ref 3.5–5.3)
POTASSIUM SERPL-SCNC: 4.1 MMOL/L — SIGNIFICANT CHANGE UP (ref 3.5–5.3)
RBC # BLD: 4.55 M/UL — SIGNIFICANT CHANGE UP (ref 4.2–5.8)
RBC # FLD: 17.8 % — HIGH (ref 10.3–14.5)
RH IG SCN BLD-IMP: POSITIVE — SIGNIFICANT CHANGE UP
SAO2 % BLDA: 100 % — HIGH (ref 95–99)
SODIUM BLDA-SCNC: 139 MMOL/L — SIGNIFICANT CHANGE UP (ref 136–146)
SODIUM SERPL-SCNC: 140 MMOL/L — SIGNIFICANT CHANGE UP (ref 135–145)
WBC # BLD: 7.82 K/UL — SIGNIFICANT CHANGE UP (ref 3.8–10.5)
WBC # FLD AUTO: 7.82 K/UL — SIGNIFICANT CHANGE UP (ref 3.8–10.5)

## 2017-09-05 PROCEDURE — S2900 ROBOTIC SURGICAL SYSTEM: CPT | Mod: NC

## 2017-09-05 PROCEDURE — 43236 UPPR GI SCOPE W/SUBMUC INJ: CPT

## 2017-09-05 PROCEDURE — 88360 TUMOR IMMUNOHISTOCHEM/MANUAL: CPT | Mod: 26

## 2017-09-05 PROCEDURE — 43122 PARTIAL REMOVAL OF ESOPHAGUS: CPT

## 2017-09-05 PROCEDURE — 88302 TISSUE EXAM BY PATHOLOGIST: CPT | Mod: 26

## 2017-09-05 PROCEDURE — 88305 TISSUE EXAM BY PATHOLOGIST: CPT | Mod: 26

## 2017-09-05 PROCEDURE — 88331 PATH CONSLTJ SURG 1 BLK 1SPC: CPT | Mod: 26

## 2017-09-05 PROCEDURE — 88332 PATH CONSLTJ SURG EA ADD BLK: CPT | Mod: 26

## 2017-09-05 PROCEDURE — 74000: CPT | Mod: 26

## 2017-09-05 PROCEDURE — 88307 TISSUE EXAM BY PATHOLOGIST: CPT | Mod: 26

## 2017-09-05 PROCEDURE — 43122 PARTIAL REMOVAL OF ESOPHAGUS: CPT | Mod: AS

## 2017-09-05 PROCEDURE — 44015 INSERT NEEDLE CATH BOWEL: CPT | Mod: AS

## 2017-09-05 PROCEDURE — 44015 INSERT NEEDLE CATH BOWEL: CPT | Mod: 59

## 2017-09-05 PROCEDURE — 88309 TISSUE EXAM BY PATHOLOGIST: CPT | Mod: 26

## 2017-09-05 PROCEDURE — 38747 REMOVE ABDOMINAL LYMPH NODES: CPT | Mod: 59

## 2017-09-05 RX ORDER — ENOXAPARIN SODIUM 100 MG/ML
40 INJECTION SUBCUTANEOUS EVERY 24 HOURS
Qty: 0 | Refills: 0 | Status: DISCONTINUED | OUTPATIENT
Start: 2017-09-06 | End: 2017-09-14

## 2017-09-05 RX ORDER — TIOTROPIUM BROMIDE 18 UG/1
1 CAPSULE ORAL; RESPIRATORY (INHALATION) DAILY
Qty: 0 | Refills: 0 | Status: DISCONTINUED | OUTPATIENT
Start: 2017-09-05 | End: 2017-09-05

## 2017-09-05 RX ORDER — ONDANSETRON 8 MG/1
4 TABLET, FILM COATED ORAL EVERY 6 HOURS
Qty: 0 | Refills: 0 | Status: DISCONTINUED | OUTPATIENT
Start: 2017-09-05 | End: 2017-09-07

## 2017-09-05 RX ORDER — HYDROMORPHONE HYDROCHLORIDE 2 MG/ML
0.5 INJECTION INTRAMUSCULAR; INTRAVENOUS; SUBCUTANEOUS
Qty: 0 | Refills: 0 | Status: DISCONTINUED | OUTPATIENT
Start: 2017-09-05 | End: 2017-09-07

## 2017-09-05 RX ORDER — PANTOPRAZOLE SODIUM 20 MG/1
40 TABLET, DELAYED RELEASE ORAL DAILY
Qty: 0 | Refills: 0 | Status: DISCONTINUED | OUTPATIENT
Start: 2017-09-05 | End: 2017-09-12

## 2017-09-05 RX ORDER — METOPROLOL TARTRATE 50 MG
5 TABLET ORAL EVERY 6 HOURS
Qty: 0 | Refills: 0 | Status: DISCONTINUED | OUTPATIENT
Start: 2017-09-05 | End: 2017-09-06

## 2017-09-05 RX ORDER — TIOTROPIUM BROMIDE 18 UG/1
1 CAPSULE ORAL; RESPIRATORY (INHALATION)
Qty: 0 | Refills: 0 | Status: DISCONTINUED | OUTPATIENT
Start: 2017-09-05 | End: 2017-09-14

## 2017-09-05 RX ORDER — BUDESONIDE AND FORMOTEROL FUMARATE DIHYDRATE 160; 4.5 UG/1; UG/1
2 AEROSOL RESPIRATORY (INHALATION)
Qty: 0 | Refills: 0 | Status: DISCONTINUED | OUTPATIENT
Start: 2017-09-05 | End: 2017-09-05

## 2017-09-05 RX ORDER — HYDROMORPHONE HYDROCHLORIDE 2 MG/ML
0.5 INJECTION INTRAMUSCULAR; INTRAVENOUS; SUBCUTANEOUS
Qty: 0 | Refills: 0 | Status: DISCONTINUED | OUTPATIENT
Start: 2017-09-05 | End: 2017-09-05

## 2017-09-05 RX ORDER — CEFOTETAN DISODIUM 1 G
1 VIAL (EA) INJECTION EVERY 12 HOURS
Qty: 0 | Refills: 0 | Status: COMPLETED | OUTPATIENT
Start: 2017-09-05 | End: 2017-09-05

## 2017-09-05 RX ORDER — BUDESONIDE AND FORMOTEROL FUMARATE DIHYDRATE 160; 4.5 UG/1; UG/1
2 AEROSOL RESPIRATORY (INHALATION)
Qty: 0 | Refills: 0 | Status: DISCONTINUED | OUTPATIENT
Start: 2017-09-05 | End: 2017-09-14

## 2017-09-05 RX ORDER — NALOXONE HYDROCHLORIDE 4 MG/.1ML
0.1 SPRAY NASAL
Qty: 0 | Refills: 0 | Status: DISCONTINUED | OUTPATIENT
Start: 2017-09-05 | End: 2017-09-07

## 2017-09-05 RX ORDER — HYDROMORPHONE HYDROCHLORIDE 2 MG/ML
30 INJECTION INTRAMUSCULAR; INTRAVENOUS; SUBCUTANEOUS
Qty: 0 | Refills: 0 | Status: DISCONTINUED | OUTPATIENT
Start: 2017-09-05 | End: 2017-09-07

## 2017-09-05 RX ORDER — MAGNESIUM SULFATE 500 MG/ML
2 VIAL (ML) INJECTION ONCE
Qty: 0 | Refills: 0 | Status: COMPLETED | OUTPATIENT
Start: 2017-09-05 | End: 2017-09-05

## 2017-09-05 RX ORDER — HYDROMORPHONE HYDROCHLORIDE 2 MG/ML
1 INJECTION INTRAMUSCULAR; INTRAVENOUS; SUBCUTANEOUS
Qty: 0 | Refills: 0 | Status: DISCONTINUED | OUTPATIENT
Start: 2017-09-05 | End: 2017-09-05

## 2017-09-05 RX ORDER — HEPARIN SODIUM 5000 [USP'U]/ML
5000 INJECTION INTRAVENOUS; SUBCUTANEOUS ONCE
Qty: 0 | Refills: 0 | Status: COMPLETED | OUTPATIENT
Start: 2017-09-05 | End: 2017-09-05

## 2017-09-05 RX ORDER — INSULIN LISPRO 100/ML
VIAL (ML) SUBCUTANEOUS EVERY 6 HOURS
Qty: 0 | Refills: 0 | Status: DISCONTINUED | OUTPATIENT
Start: 2017-09-05 | End: 2017-09-11

## 2017-09-05 RX ADMIN — HEPARIN SODIUM 5000 UNIT(S): 5000 INJECTION INTRAVENOUS; SUBCUTANEOUS at 07:38

## 2017-09-05 RX ADMIN — SODIUM CHLORIDE 30 MILLILITER(S): 9 INJECTION, SOLUTION INTRAVENOUS at 07:38

## 2017-09-05 RX ADMIN — HYDROMORPHONE HYDROCHLORIDE 30 MILLILITER(S): 2 INJECTION INTRAMUSCULAR; INTRAVENOUS; SUBCUTANEOUS at 13:20

## 2017-09-05 RX ADMIN — HYDROMORPHONE HYDROCHLORIDE 30 MILLILITER(S): 2 INJECTION INTRAMUSCULAR; INTRAVENOUS; SUBCUTANEOUS at 17:15

## 2017-09-05 RX ADMIN — HYDROMORPHONE HYDROCHLORIDE 0.5 MILLIGRAM(S): 2 INJECTION INTRAMUSCULAR; INTRAVENOUS; SUBCUTANEOUS at 12:55

## 2017-09-05 RX ADMIN — PANTOPRAZOLE SODIUM 40 MILLIGRAM(S): 20 TABLET, DELAYED RELEASE ORAL at 18:05

## 2017-09-05 RX ADMIN — SODIUM CHLORIDE 125 MILLILITER(S): 9 INJECTION, SOLUTION INTRAVENOUS at 12:45

## 2017-09-05 RX ADMIN — Medication 120 GRAM(S): at 22:06

## 2017-09-05 RX ADMIN — SODIUM CHLORIDE 125 MILLILITER(S): 9 INJECTION, SOLUTION INTRAVENOUS at 23:25

## 2017-09-05 RX ADMIN — HYDROMORPHONE HYDROCHLORIDE 1 MILLIGRAM(S): 2 INJECTION INTRAMUSCULAR; INTRAVENOUS; SUBCUTANEOUS at 12:57

## 2017-09-05 RX ADMIN — Medication 1 DROP(S): at 23:21

## 2017-09-05 RX ADMIN — ONDANSETRON 4 MILLIGRAM(S): 8 TABLET, FILM COATED ORAL at 23:19

## 2017-09-05 RX ADMIN — Medication 50 GRAM(S): at 14:25

## 2017-09-05 RX ADMIN — HYDROMORPHONE HYDROCHLORIDE 0.5 MILLIGRAM(S): 2 INJECTION INTRAMUSCULAR; INTRAVENOUS; SUBCUTANEOUS at 12:45

## 2017-09-05 RX ADMIN — HYDROMORPHONE HYDROCHLORIDE 1 MILLIGRAM(S): 2 INJECTION INTRAMUSCULAR; INTRAVENOUS; SUBCUTANEOUS at 13:10

## 2017-09-05 RX ADMIN — HYDROMORPHONE HYDROCHLORIDE 30 MILLILITER(S): 2 INJECTION INTRAMUSCULAR; INTRAVENOUS; SUBCUTANEOUS at 19:31

## 2017-09-05 NOTE — PATIENT PROFILE ADULT. - PMH
Afib    BPH (benign prostatic hyperplasia)    COPD (chronic obstructive pulmonary disease)    Diabetes    Dyslipidemia    Gastric cancer  chemo, has infuspaort  Hepatitis B    History of hypertension    Memory change

## 2017-09-05 NOTE — PROGRESS NOTE ADULT - SUBJECTIVE AND OBJECTIVE BOX
Morning Surgical Progress Note  Patient is a 67y old  Male who presents with a chief complaint of "I have stomach cancer, having surgery" (22 Aug 2017 10:38)    SUBJECTIVE: Patient seen and examined at bedside with surgical resident, patient daughter at bedside. Patient complains of abdominal pain with movement, he denies nausea and vomiting, denies dizziness, flatus and bm.    Vital Signs Last 24 Hrs  T(C): 36.8 (05 Sep 2017 16:00), Max: 37.2 (05 Sep 2017 06:24)  T(F): 98.2 (05 Sep 2017 16:00), Max: 99 (05 Sep 2017 06:24)  HR: 89 (05 Sep 2017 16:00) (71 - 100)  BP: 151/79 (05 Sep 2017 16:00) (133/86 - 165/91)  BP(mean): --  RR: 12 (05 Sep 2017 16:00) (10 - 16)  SpO2: 97% (05 Sep 2017 16:00) (95% - 100%)  I&O's Detail    05 Sep 2017 07:01  -  05 Sep 2017 17:17  --------------------------------------------------------  IN:    lactated ringers.: 440 mL  Total IN: 440 mL    OUT:    Bulb: 59 mL    Indwelling Catheter - Urethral: 385 mL  Total OUT: 444 mL    Total NET: -4 mL    MEDICATIONS  (STANDING):  lactated ringers. 1000 milliLiter(s) (125 mL/Hr) IV Continuous <Continuous>  HYDROmorphone PCA (1 mG/mL) 30 milliLiter(s) PCA Continuous PCA Continuous  cefoTEtan  IVPB 1 Gram(s) IV Intermittent every 12 hours  buDESOnide 160 MICROgram(s)/formoterol 4.5 MICROgram(s) Inhaler 2 Puff(s) Inhalation two times a day  artificial  tears Solution 1 Drop(s) Both EYES two times a day  pantoprazole  Injectable 40 milliGRAM(s) IV Push daily  metoprolol Injectable 5 milliGRAM(s) IV Push every 6 hours  insulin lispro (HumaLOG) corrective regimen sliding scale   SubCutaneous every 6 hours  tiotropium 18 MICROgram(s) Capsule 1 Capsule(s) Inhalation <User Schedule>    MEDICATIONS  (PRN):  HYDROmorphone PCA (1 mG/mL) Rescue Clinician Bolus 0.5 milliGRAM(s) IV Push every 15 minutes PRN for Pain Scale GREATER THAN 6  naloxone Injectable 0.1 milliGRAM(s) IV Push every 3 minutes PRN For ANY of the following changes in patient status:  A. RR LESS THAN 10 breaths per minute, B. Oxygen saturation LESS THAN 90%, C. Sedation score of 6  ondansetron Injectable 4 milliGRAM(s) IV Push every 6 hours PRN Nausea  HYDROmorphone  Injectable 1 milliGRAM(s) IV Push every 10 minutes PRN Severe Pain (7 - 10)  HYDROmorphone  Injectable 0.5 milliGRAM(s) IV Push every 10 minutes PRN Moderate Pain (4 - 6)      Physical Exam  General: A&Ox3, NAD, + NGT minimal bloody output in tubing, canister empty  Abdominal: soft, incision c/d/i, + j tube + right sided malou with minimal sanguineous drainage    LABS:                        12.2   7.82  )-----------( 154      ( 05 Sep 2017 12:45 )             39.2     09-05    140  |  102  |  15  ----------------------------<  125<H>  4.1   |  25  |  0.91    Ca    8.6      05 Sep 2017 12:45  Phos  3.2     09-05  Mg     1.3     09-05        ABO Interpretation: B (09-05-17 @ 06:23)      Patient is a 67y Male s/p partial gastrectomy and j tube placement  -NPO, NGT, IVF  -Replete Mag  -DVT ppx  -Ingram to gravity  -NGT to suction  -COPD medications  -Lopressor for HTN

## 2017-09-05 NOTE — BRIEF OPERATIVE NOTE - POST-OP DX
Malignant neoplasm of stomach, unspecified location  09/05/2017    Ilene Javier  Umbilical hernia  09/05/2017    Ilene Javier

## 2017-09-05 NOTE — BRIEF OPERATIVE NOTE - PROCEDURE
Esophagogastroduodenoscopy (EDG)  09/05/2017    Active  ADONNATI  Exploratory laparotomy  09/05/2017    Active  ADONNATI  Proximal gastrectomy  09/05/2017    Active  ADONNATI  Jejunostomy feeding tube placement  09/05/2017    Active  ADONNATI  Umbilical hernia repair  09/05/2017    Active  ADONNATI  Liver biopsy, open, done in OR  09/05/2017    Active  ADONNATI

## 2017-09-06 LAB
BASOPHILS # BLD AUTO: 0.01 K/UL — SIGNIFICANT CHANGE UP (ref 0–0.2)
BASOPHILS NFR BLD AUTO: 0.1 % — SIGNIFICANT CHANGE UP (ref 0–2)
BUN SERPL-MCNC: 16 MG/DL — SIGNIFICANT CHANGE UP (ref 7–23)
CALCIUM SERPL-MCNC: 9 MG/DL — SIGNIFICANT CHANGE UP (ref 8.4–10.5)
CHLORIDE SERPL-SCNC: 102 MMOL/L — SIGNIFICANT CHANGE UP (ref 98–107)
CO2 SERPL-SCNC: 24 MMOL/L — SIGNIFICANT CHANGE UP (ref 22–31)
CREAT SERPL-MCNC: 1.08 MG/DL — SIGNIFICANT CHANGE UP (ref 0.5–1.3)
EOSINOPHIL # BLD AUTO: 0 K/UL — SIGNIFICANT CHANGE UP (ref 0–0.5)
EOSINOPHIL NFR BLD AUTO: 0 % — SIGNIFICANT CHANGE UP (ref 0–6)
GLUCOSE SERPL-MCNC: 123 MG/DL — HIGH (ref 70–99)
HCT VFR BLD CALC: 38 % — LOW (ref 39–50)
HGB BLD-MCNC: 12.4 G/DL — LOW (ref 13–17)
IMM GRANULOCYTES # BLD AUTO: 0.07 # — SIGNIFICANT CHANGE UP
IMM GRANULOCYTES NFR BLD AUTO: 0.5 % — SIGNIFICANT CHANGE UP (ref 0–1.5)
LYMPHOCYTES # BLD AUTO: 0.69 K/UL — LOW (ref 1–3.3)
LYMPHOCYTES # BLD AUTO: 5.2 % — LOW (ref 13–44)
MAGNESIUM SERPL-MCNC: 2 MG/DL — SIGNIFICANT CHANGE UP (ref 1.6–2.6)
MCHC RBC-ENTMCNC: 28.6 PG — SIGNIFICANT CHANGE UP (ref 27–34)
MCHC RBC-ENTMCNC: 32.6 % — SIGNIFICANT CHANGE UP (ref 32–36)
MCV RBC AUTO: 87.6 FL — SIGNIFICANT CHANGE UP (ref 80–100)
MONOCYTES # BLD AUTO: 0.69 K/UL — SIGNIFICANT CHANGE UP (ref 0–0.9)
MONOCYTES NFR BLD AUTO: 5.2 % — SIGNIFICANT CHANGE UP (ref 2–14)
NEUTROPHILS # BLD AUTO: 11.9 K/UL — HIGH (ref 1.8–7.4)
NEUTROPHILS NFR BLD AUTO: 89 % — HIGH (ref 43–77)
NRBC # FLD: 0 — SIGNIFICANT CHANGE UP
PHOSPHATE SERPL-MCNC: 3.4 MG/DL — SIGNIFICANT CHANGE UP (ref 2.5–4.5)
PLATELET # BLD AUTO: 167 K/UL — SIGNIFICANT CHANGE UP (ref 150–400)
PMV BLD: 11.2 FL — SIGNIFICANT CHANGE UP (ref 7–13)
POTASSIUM SERPL-MCNC: 4.9 MMOL/L — SIGNIFICANT CHANGE UP (ref 3.5–5.3)
POTASSIUM SERPL-SCNC: 4.9 MMOL/L — SIGNIFICANT CHANGE UP (ref 3.5–5.3)
RBC # BLD: 4.34 M/UL — SIGNIFICANT CHANGE UP (ref 4.2–5.8)
RBC # FLD: 17.9 % — HIGH (ref 10.3–14.5)
SODIUM SERPL-SCNC: 142 MMOL/L — SIGNIFICANT CHANGE UP (ref 135–145)
WBC # BLD: 13.36 K/UL — HIGH (ref 3.8–10.5)
WBC # FLD AUTO: 13.36 K/UL — HIGH (ref 3.8–10.5)

## 2017-09-06 PROCEDURE — 74000: CPT | Mod: 26

## 2017-09-06 RX ORDER — LIDOCAINE HCL 20 MG/ML
10 VIAL (ML) INJECTION
Qty: 0 | Refills: 0 | Status: DISCONTINUED | OUTPATIENT
Start: 2017-09-06 | End: 2017-09-13

## 2017-09-06 RX ORDER — TETRACAINE/BENZOCAINE/BUTAMBEN 2%-14%-2%
1 OINTMENT (GRAM) TOPICAL EVERY 6 HOURS
Qty: 0 | Refills: 0 | Status: DISCONTINUED | OUTPATIENT
Start: 2017-09-06 | End: 2017-09-14

## 2017-09-06 RX ORDER — BENZOCAINE AND MENTHOL 5; 1 G/100ML; G/100ML
1 LIQUID ORAL ONCE
Qty: 0 | Refills: 0 | Status: COMPLETED | OUTPATIENT
Start: 2017-09-06 | End: 2017-09-06

## 2017-09-06 RX ORDER — SODIUM CHLORIDE 9 MG/ML
1000 INJECTION, SOLUTION INTRAVENOUS ONCE
Qty: 0 | Refills: 0 | Status: COMPLETED | OUTPATIENT
Start: 2017-09-06 | End: 2017-09-06

## 2017-09-06 RX ORDER — METOPROLOL TARTRATE 50 MG
5 TABLET ORAL EVERY 6 HOURS
Qty: 0 | Refills: 0 | Status: DISCONTINUED | OUTPATIENT
Start: 2017-09-06 | End: 2017-09-07

## 2017-09-06 RX ORDER — SOTALOL HCL 120 MG
80 TABLET ORAL
Qty: 0 | Refills: 0 | Status: DISCONTINUED | OUTPATIENT
Start: 2017-09-06 | End: 2017-09-06

## 2017-09-06 RX ADMIN — SODIUM CHLORIDE 125 MILLILITER(S): 9 INJECTION, SOLUTION INTRAVENOUS at 07:12

## 2017-09-06 RX ADMIN — Medication 1 DROP(S): at 19:02

## 2017-09-06 RX ADMIN — ENOXAPARIN SODIUM 40 MILLIGRAM(S): 100 INJECTION SUBCUTANEOUS at 13:34

## 2017-09-06 RX ADMIN — SODIUM CHLORIDE 125 MILLILITER(S): 9 INJECTION, SOLUTION INTRAVENOUS at 23:28

## 2017-09-06 RX ADMIN — PANTOPRAZOLE SODIUM 40 MILLIGRAM(S): 20 TABLET, DELAYED RELEASE ORAL at 13:34

## 2017-09-06 RX ADMIN — BENZOCAINE AND MENTHOL 1 LOZENGE: 5; 1 LIQUID ORAL at 19:06

## 2017-09-06 RX ADMIN — Medication 5 MILLIGRAM(S): at 05:56

## 2017-09-06 RX ADMIN — SODIUM CHLORIDE 500 MILLILITER(S): 9 INJECTION, SOLUTION INTRAVENOUS at 23:29

## 2017-09-06 RX ADMIN — Medication 10 MILLILITER(S): at 21:17

## 2017-09-06 RX ADMIN — HYDROMORPHONE HYDROCHLORIDE 30 MILLILITER(S): 2 INJECTION INTRAMUSCULAR; INTRAVENOUS; SUBCUTANEOUS at 19:19

## 2017-09-06 RX ADMIN — Medication 5 MILLIGRAM(S): at 19:12

## 2017-09-06 RX ADMIN — Medication 1 DROP(S): at 05:56

## 2017-09-06 RX ADMIN — Medication 5 MILLIGRAM(S): at 00:43

## 2017-09-06 RX ADMIN — Medication 5 MILLIGRAM(S): at 13:39

## 2017-09-06 RX ADMIN — BUDESONIDE AND FORMOTEROL FUMARATE DIHYDRATE 2 PUFF(S): 160; 4.5 AEROSOL RESPIRATORY (INHALATION) at 09:38

## 2017-09-06 RX ADMIN — HYDROMORPHONE HYDROCHLORIDE 30 MILLILITER(S): 2 INJECTION INTRAMUSCULAR; INTRAVENOUS; SUBCUTANEOUS at 07:12

## 2017-09-06 RX ADMIN — TIOTROPIUM BROMIDE 1 CAPSULE(S): 18 CAPSULE ORAL; RESPIRATORY (INHALATION) at 13:51

## 2017-09-06 RX ADMIN — BUDESONIDE AND FORMOTEROL FUMARATE DIHYDRATE 2 PUFF(S): 160; 4.5 AEROSOL RESPIRATORY (INHALATION) at 21:11

## 2017-09-06 NOTE — PROGRESS NOTE ADULT - SUBJECTIVE AND OBJECTIVE BOX
ANESTHESIA POSTOP CHECK    67y Male POSTOP DAY 1 S/P partial gastrectomy    Vital Signs Last 24 Hrs  T(C): 36.9 (06 Sep 2017 11:33), Max: 37.1 (06 Sep 2017 05:51)  T(F): 98.4 (06 Sep 2017 11:33), Max: 98.7 (06 Sep 2017 05:51)  HR: 91 (06 Sep 2017 11:33) (87 - 108)  BP: 113/68 (06 Sep 2017 11:33) (111/68 - 159/87)  BP(mean): --  RR: 17 (06 Sep 2017 11:33) (10 - 18)  SpO2: 95% (06 Sep 2017 11:33) (95% - 100%)  I&O's Summary    05 Sep 2017 07:01  -  06 Sep 2017 07:00  --------------------------------------------------------  IN: 1940 mL / OUT: 2114 mL / NET: -174 mL    06 Sep 2017 07:01  -  06 Sep 2017 13:17  --------------------------------------------------------  IN: 0 mL / OUT: 135 mL / NET: -135 mL        [X ] NO APPARENT ANESTHESIA COMPLICATIONS      Comments:

## 2017-09-06 NOTE — PROGRESS NOTE ADULT - SUBJECTIVE AND OBJECTIVE BOX
Anesthesia Pain Management Service    SUBJECTIVE: Patient is doing well with IV PCA and no significant problems reported.    Pain Scale Score	At rest: ___ 	With Activity: ___ 	[X ] Refer to charted pain scores    THERAPY:    [ ] IV PCA Morphine		[ ] 5 mg/mL	[ ] 1 mg/mL  [X ] IV PCA Hydromorphone	[ ] 5 mg/mL	[X ] 1 mg/mL  [ ] IV PCA Fentanyl		[ ] 50 micrograms/mL    Demand dose __0.2_ lockout __6_ (minutes) Continuous Rate _0__ Total: _6.2 mg__  Daily      MEDICATIONS  (STANDING):  lactated ringers. 1000 milliLiter(s) (125 mL/Hr) IV Continuous <Continuous>  HYDROmorphone PCA (1 mG/mL) 30 milliLiter(s) PCA Continuous PCA Continuous  enoxaparin Injectable 40 milliGRAM(s) SubCutaneous every 24 hours  buDESOnide 160 MICROgram(s)/formoterol 4.5 MICROgram(s) Inhaler 2 Puff(s) Inhalation two times a day  artificial  tears Solution 1 Drop(s) Both EYES two times a day  pantoprazole  Injectable 40 milliGRAM(s) IV Push daily  metoprolol Injectable 5 milliGRAM(s) IV Push every 6 hours  insulin lispro (HumaLOG) corrective regimen sliding scale   SubCutaneous every 6 hours  tiotropium 18 MICROgram(s) Capsule 1 Capsule(s) Inhalation <User Schedule>    MEDICATIONS  (PRN):  HYDROmorphone PCA (1 mG/mL) Rescue Clinician Bolus 0.5 milliGRAM(s) IV Push every 15 minutes PRN for Pain Scale GREATER THAN 6  naloxone Injectable 0.1 milliGRAM(s) IV Push every 3 minutes PRN For ANY of the following changes in patient status:  A. RR LESS THAN 10 breaths per minute, B. Oxygen saturation LESS THAN 90%, C. Sedation score of 6  ondansetron Injectable 4 milliGRAM(s) IV Push every 6 hours PRN Nausea      OBJECTIVE:    Sedation Score:	[ X] Alert	[ ] Drowsy 	[ ] Arousable	[ ] Asleep	[ ] Unresponsive    Side Effects:	[X ] None	[ ] Nausea	[ ] Vomiting	[ ] Pruritus  		[ ] Other:    Vital Signs Last 24 Hrs  T(C): 36.9 (06 Sep 2017 11:33), Max: 37.1 (06 Sep 2017 05:51)  T(F): 98.4 (06 Sep 2017 11:33), Max: 98.7 (06 Sep 2017 05:51)  HR: 91 (06 Sep 2017 11:33) (87 - 108)  BP: 113/68 (06 Sep 2017 11:33) (111/68 - 159/87)  BP(mean): --  RR: 17 (06 Sep 2017 11:33) (10 - 18)  SpO2: 95% (06 Sep 2017 11:33) (95% - 100%)    ASSESSMENT/ PLAN    Therapy to  be:	[ X] Continue   [ ] Discontinued   [ ] Change to prn Analgesics    Documentation and Verification of current medications:   [X] Done	[ ] Not done, not elligible    Comments:

## 2017-09-06 NOTE — CONSULT NOTE ADULT - ASSESSMENT
66 yo M w/hypertension, diabetes, A-fib, COPD, Hep B, dyslipidemia, and BPH, gastric CA s/p gastrectomy:  1. Gastric CA s/p gastrectomy + jejunostomy - management per Surgery, IVF while NPO, pain control, bowel regimen, incentive spirometry, DVT prophylaxis, PT/OOB, PPI  2. DM2 - off PO meds, c/w ISS  3. A fib - rate controlled, c/w IV Lopressor while NPO, reports AC stopped 2/2 Watchman procedure, restart ASA when cleared by Surgery  4. COPD  -no wheezing, c/w Symbicart + Spiriva  5. HLD - restart statin when eating  6. Dizziness - likely postop, will monitor, check orthostatics

## 2017-09-06 NOTE — PROGRESS NOTE ADULT - ASSESSMENT
67y Male POD1 s/p partial gastrectomy and j tube placement  -NPO/NGT  -F/u labs  -DVT ppx  -F/u UOP  - Pain control

## 2017-09-06 NOTE — PROGRESS NOTE ADULT - SUBJECTIVE AND OBJECTIVE BOX
DX:  s/p proximal gastrectomy, feeding jejunostomy, biopsy of liver nodule  POD#:1          SUBJECTIVE    Pt underwent proximal gastrectomy, feeding jejunostomy, biopsy of liver nodule for proximal gastric cancer yesterday. Pt tolerated procedure well. NAEO. This am pt states he feels well overall, pain well controlled. Patient denies nausea.     10-point review of systems completed and negative except as noted above.    lactated ringers. 1000 milliLiter(s) IV Continuous <Continuous>  HYDROmorphone PCA (1 mG/mL) 30 milliLiter(s) PCA Continuous PCA Continuous  HYDROmorphone PCA (1 mG/mL) Rescue Clinician Bolus 0.5 milliGRAM(s) IV Push every 15 minutes PRN  naloxone Injectable 0.1 milliGRAM(s) IV Push every 3 minutes PRN  ondansetron Injectable 4 milliGRAM(s) IV Push every 6 hours PRN  enoxaparin Injectable 40 milliGRAM(s) SubCutaneous every 24 hours  buDESOnide 160 MICROgram(s)/formoterol 4.5 MICROgram(s) Inhaler 2 Puff(s) Inhalation two times a day  artificial  tears Solution 1 Drop(s) Both EYES two times a day  pantoprazole  Injectable 40 milliGRAM(s) IV Push daily  metoprolol Injectable 5 milliGRAM(s) IV Push every 6 hours  insulin lispro (HumaLOG) corrective regimen sliding scale   SubCutaneous every 6 hours  tiotropium 18 MICROgram(s) Capsule 1 Capsule(s) Inhalation <User Schedule>            OBJECTIVE    T(C): 36.9 (09-06-17 @ 11:33), Max: 37.1 (09-06-17 @ 05:51)  HR: 112 (09-06-17 @ 13:28) (87 - 112)  BP: 137/80 (09-06-17 @ 13:28) (111/68 - 159/87)  RR: 17 (09-06-17 @ 11:33) (10 - 18)  SpO2: 95% (09-06-17 @ 11:33) (95% - 98%)    09-05-17 @ 07:01  -  09-06-17 @ 07:00  --------------------------------------------------------  IN: 1940 mL / OUT: 2114 mL / NET: -174 mL    09-06-17 @ 07:01  -  09-06-17 @ 14:42  --------------------------------------------------------  IN: 0 mL / OUT: 880 mL / NET: -880 mL        EXAM  General: Laying in bed, in NAD  Pulm: CTAB, respirations unlabored, no increased WOB  CV: Regular rate and rhythm  Abdomen: Softly distended, appropriately tender. Midline dressing c/d/i. J tube in place.  Extremities: WWPx4, Grossly symmetric    LABS                        12.4   13.36 )-----------( 167      ( 06 Sep 2017 06:16 )             38.0     09-06    142  |  102  |  16  ----------------------------<  123<H>  4.9   |  24  |  1.08    Ca    9.0      06 Sep 2017 06:16  Phos  3.4     09-06  Mg     2.0     09-06

## 2017-09-07 LAB
BUN SERPL-MCNC: 19 MG/DL — SIGNIFICANT CHANGE UP (ref 7–23)
CALCIUM SERPL-MCNC: 8.9 MG/DL — SIGNIFICANT CHANGE UP (ref 8.4–10.5)
CHLORIDE SERPL-SCNC: 103 MMOL/L — SIGNIFICANT CHANGE UP (ref 98–107)
CO2 SERPL-SCNC: 25 MMOL/L — SIGNIFICANT CHANGE UP (ref 22–31)
CREAT SERPL-MCNC: 1.08 MG/DL — SIGNIFICANT CHANGE UP (ref 0.5–1.3)
GLUCOSE SERPL-MCNC: 95 MG/DL — SIGNIFICANT CHANGE UP (ref 70–99)
HCT VFR BLD CALC: 37.4 % — LOW (ref 39–50)
HGB BLD-MCNC: 11.8 G/DL — LOW (ref 13–17)
MAGNESIUM SERPL-MCNC: 1.6 MG/DL — SIGNIFICANT CHANGE UP (ref 1.6–2.6)
MCHC RBC-ENTMCNC: 28 PG — SIGNIFICANT CHANGE UP (ref 27–34)
MCHC RBC-ENTMCNC: 31.6 % — LOW (ref 32–36)
MCV RBC AUTO: 88.8 FL — SIGNIFICANT CHANGE UP (ref 80–100)
NRBC # FLD: 0 — SIGNIFICANT CHANGE UP
PHOSPHATE SERPL-MCNC: 2.5 MG/DL — SIGNIFICANT CHANGE UP (ref 2.5–4.5)
PLATELET # BLD AUTO: 147 K/UL — LOW (ref 150–400)
PMV BLD: 11.3 FL — SIGNIFICANT CHANGE UP (ref 7–13)
POTASSIUM SERPL-MCNC: 4.4 MMOL/L — SIGNIFICANT CHANGE UP (ref 3.5–5.3)
POTASSIUM SERPL-SCNC: 4.4 MMOL/L — SIGNIFICANT CHANGE UP (ref 3.5–5.3)
RBC # BLD: 4.21 M/UL — SIGNIFICANT CHANGE UP (ref 4.2–5.8)
RBC # FLD: 18.1 % — HIGH (ref 10.3–14.5)
SODIUM SERPL-SCNC: 142 MMOL/L — SIGNIFICANT CHANGE UP (ref 135–145)
WBC # BLD: 10.08 K/UL — SIGNIFICANT CHANGE UP (ref 3.8–10.5)
WBC # FLD AUTO: 10.08 K/UL — SIGNIFICANT CHANGE UP (ref 3.8–10.5)

## 2017-09-07 PROCEDURE — 74000: CPT | Mod: 26,76

## 2017-09-07 RX ORDER — MORPHINE SULFATE 50 MG/1
30 CAPSULE, EXTENDED RELEASE ORAL
Qty: 0 | Refills: 0 | Status: DISCONTINUED | OUTPATIENT
Start: 2017-09-07 | End: 2017-09-10

## 2017-09-07 RX ORDER — DIPHENHYDRAMINE HCL 50 MG
25 CAPSULE ORAL EVERY 4 HOURS
Qty: 0 | Refills: 0 | Status: DISCONTINUED | OUTPATIENT
Start: 2017-09-07 | End: 2017-09-12

## 2017-09-07 RX ORDER — MAGNESIUM SULFATE 500 MG/ML
2 VIAL (ML) INJECTION ONCE
Qty: 0 | Refills: 0 | Status: COMPLETED | OUTPATIENT
Start: 2017-09-07 | End: 2017-09-07

## 2017-09-07 RX ORDER — DEXTROSE MONOHYDRATE, SODIUM CHLORIDE, AND POTASSIUM CHLORIDE 50; .745; 4.5 G/1000ML; G/1000ML; G/1000ML
1000 INJECTION, SOLUTION INTRAVENOUS
Qty: 0 | Refills: 0 | Status: DISCONTINUED | OUTPATIENT
Start: 2017-09-07 | End: 2017-09-12

## 2017-09-07 RX ORDER — MORPHINE SULFATE 50 MG/1
3 CAPSULE, EXTENDED RELEASE ORAL
Qty: 0 | Refills: 0 | Status: DISCONTINUED | OUTPATIENT
Start: 2017-09-07 | End: 2017-09-10

## 2017-09-07 RX ORDER — NALOXONE HYDROCHLORIDE 4 MG/.1ML
0.1 SPRAY NASAL
Qty: 0 | Refills: 0 | Status: DISCONTINUED | OUTPATIENT
Start: 2017-09-07 | End: 2017-09-10

## 2017-09-07 RX ORDER — SOTALOL HCL 120 MG
80 TABLET ORAL EVERY 12 HOURS
Qty: 0 | Refills: 0 | Status: DISCONTINUED | OUTPATIENT
Start: 2017-09-07 | End: 2017-09-08

## 2017-09-07 RX ORDER — ONDANSETRON 8 MG/1
4 TABLET, FILM COATED ORAL EVERY 6 HOURS
Qty: 0 | Refills: 0 | Status: DISCONTINUED | OUTPATIENT
Start: 2017-09-07 | End: 2017-09-12

## 2017-09-07 RX ORDER — SODIUM CHLORIDE 9 MG/ML
1000 INJECTION, SOLUTION INTRAVENOUS
Qty: 0 | Refills: 0 | Status: DISCONTINUED | OUTPATIENT
Start: 2017-09-07 | End: 2017-09-11

## 2017-09-07 RX ADMIN — Medication 5 MILLIGRAM(S): at 06:20

## 2017-09-07 RX ADMIN — DEXTROSE MONOHYDRATE, SODIUM CHLORIDE, AND POTASSIUM CHLORIDE 100 MILLILITER(S): 50; .745; 4.5 INJECTION, SOLUTION INTRAVENOUS at 12:53

## 2017-09-07 RX ADMIN — Medication 50 GRAM(S): at 16:28

## 2017-09-07 RX ADMIN — Medication 80 MILLIGRAM(S): at 22:07

## 2017-09-07 RX ADMIN — Medication 5 MILLIGRAM(S): at 12:48

## 2017-09-07 RX ADMIN — Medication 1 DROP(S): at 18:14

## 2017-09-07 RX ADMIN — BUDESONIDE AND FORMOTEROL FUMARATE DIHYDRATE 2 PUFF(S): 160; 4.5 AEROSOL RESPIRATORY (INHALATION) at 09:14

## 2017-09-07 RX ADMIN — Medication 5 MILLIGRAM(S): at 00:21

## 2017-09-07 RX ADMIN — HYDROMORPHONE HYDROCHLORIDE 30 MILLILITER(S): 2 INJECTION INTRAMUSCULAR; INTRAVENOUS; SUBCUTANEOUS at 07:20

## 2017-09-07 RX ADMIN — ENOXAPARIN SODIUM 40 MILLIGRAM(S): 100 INJECTION SUBCUTANEOUS at 12:48

## 2017-09-07 RX ADMIN — PANTOPRAZOLE SODIUM 40 MILLIGRAM(S): 20 TABLET, DELAYED RELEASE ORAL at 12:48

## 2017-09-07 RX ADMIN — Medication 10 MILLILITER(S): at 06:29

## 2017-09-07 RX ADMIN — BUDESONIDE AND FORMOTEROL FUMARATE DIHYDRATE 2 PUFF(S): 160; 4.5 AEROSOL RESPIRATORY (INHALATION) at 22:18

## 2017-09-07 RX ADMIN — SODIUM CHLORIDE 100 MILLILITER(S): 9 INJECTION, SOLUTION INTRAVENOUS at 16:28

## 2017-09-07 RX ADMIN — Medication 1 SPRAY(S): at 03:37

## 2017-09-07 RX ADMIN — Medication 1 SPRAY(S): at 20:00

## 2017-09-07 RX ADMIN — MORPHINE SULFATE 30 MILLILITER(S): 50 CAPSULE, EXTENDED RELEASE ORAL at 20:57

## 2017-09-07 NOTE — PROGRESS NOTE ADULT - SUBJECTIVE AND OBJECTIVE BOX
D-Team (Surgical Oncology) Daily Progress Note    SUBJECTIVE:  Pt seen and examined, and is resting comfortably in bed. Overnight, became tachycardic and had decreased urine output so he was given a 1L bolus. Afterwards, his urine output picked up, but he remained tachycardic. Pain is adequately controlled on current regimen. Pt has no complaints at this time.    OBJECTIVE:  Vital Signs Last 24 Hrs  T(C): 37 (07 Sep 2017 16:38), Max: 37.2 (07 Sep 2017 00:16)  T(F): 98.6 (07 Sep 2017 16:38), Max: 99 (07 Sep 2017 12:46)  HR: 137 (07 Sep 2017 16:38) (116 - 147)  BP: 148/87 (07 Sep 2017 16:38) (124/86 - 154/92)  BP(mean): --  RR: 20 (07 Sep 2017 16:38) (16 - 20)  SpO2: 94% (07 Sep 2017 16:38) (93% - 98%)    I&O's Detail    06 Sep 2017 07:01  -  07 Sep 2017 07:00  --------------------------------------------------------  IN:    Lactated Ringers IV Bolus: 1000 mL    lactated ringers.: 2625 mL  Total IN: 3625 mL    OUT:    Bulb: 80 mL    Indwelling Catheter - Urethral: 1250 mL    Nasoenteral Tube: 800 mL  Total OUT: 2130 mL    Total NET: 1495 mL      07 Sep 2017 07:01  -  07 Sep 2017 17:20  --------------------------------------------------------  IN:    dextrose 5% + sodium chloride 0.45% with potassium chloride 20 mEq/L: 700 mL    lactated ringers.: 375 mL  Total IN: 1075 mL    OUT:    Bulb: 30 mL    Indwelling Catheter - Urethral: 725 mL  Total OUT: 755 mL    Total NET: 320 mL      Exam:  GEN: A&Ox3, NAD  HEENT: atraumatic, normocephalic  CV: no JVD  RESP: no increased work of breathing, no use of accessory muscles  GI/ABD: Soft, appropriately tender, mildly distended, J-tube in place, NGT w/ bilious output  : deferred  EXTREMITIES: warm, pink, well-perfused                          11.8   10.08 )-----------( 147      ( 07 Sep 2017 06:24 )             37.4       09-07    142  |  103  |  19  ----------------------------<  95  4.4   |  25  |  1.08    Ca    8.9      07 Sep 2017 06:24  Phos  2.5     09-07  Mg     1.6     09-07            ASSESSMENT:  67y Male POD1 s/p partial gastrectomy and j tube placement    PLAN:    - NPO/NGT  - F/u labs  - DVT ppx  - F/u UOP  - Pain control  - J-tube study today  - No manipulation of NGT tube      Nathalie Lee MD PGY-1  pager: [phone]

## 2017-09-07 NOTE — PROGRESS NOTE ADULT - ASSESSMENT
68 yo M w/hypertension, diabetes, A-fib, COPD, Hep B, dyslipidemia, and BPH, gastric CA s/p gastrectomy:  1. Gastric CA s/p gastrectomy + jejunostomy - management per Surgery, IVF while NPO, pain control, bowel regimen, incentive spirometry, DVT prophylaxis, PT/OOB, PPI  2. DM2 - off PO meds, c/w ISS  3. A fib - rate controlled, c/w IV Lopressor while NPO, reports AC stopped 2/2 Watchman procedure, restart ASA when cleared by Surgery  4. COPD  -no wheezing, c/w Symbicart + Spiriva  5. HLD - restart statin when eating  6. Dizziness - resolved

## 2017-09-07 NOTE — PROGRESS NOTE ADULT - SUBJECTIVE AND OBJECTIVE BOX
CHIEF COMPLAINT:Patient is a 67y old  Male who presents with a chief complaint of "I have stomach cancer, having surgery" (22 Aug 2017 10:38)    	  Interval history: no events      Allergies:  sulfa drugs (Unknown)      PAST MEDICAL & SURGICAL HISTORY:  Memory change  Afib  BPH (benign prostatic hyperplasia)  Hepatitis B  COPD (chronic obstructive pulmonary disease)  Diabetes  Gastric cancer: chemo, has infuspaort  Dyslipidemia  History of hypertension  History of embolic filter insertion: June 2017  History of tonsillectomy      FAMILY HISTORY:  No pertinent family history in first degree relatives      REVIEW OF SYSTEMS:  CONSTITUTIONAL: No fever, weight loss, or fatigue  EYES: No eye pain, visual disturbances, or discharge  NECK: No pain or stiffness  RESPIRATORY: No cough or wheezing, no shortness of breath  CARDIOVASCULAR: No chest pain, palpitations, dizziness, or leg swelling  GASTROINTESTINAL: postop abdominal pain. No nausea, vomiting, diarrhea or constipation  GENITOURINARY: No dysuria, urinary frequency or urgency, no hematuria  NEUROLOGICAL: No headaches, memory loss, loss of strength, numbness, or tremors  SKIN: No itching, burning, rashes, or lesions   MUSCULOSKELETAL: No joint pain or swelling; No muscle, back, or extremity pain    Medications:  MEDICATIONS  (STANDING):  HYDROmorphone PCA (1 mG/mL) 30 milliLiter(s) PCA Continuous PCA Continuous  enoxaparin Injectable 40 milliGRAM(s) SubCutaneous every 24 hours  buDESOnide 160 MICROgram(s)/formoterol 4.5 MICROgram(s) Inhaler 2 Puff(s) Inhalation two times a day  artificial  tears Solution 1 Drop(s) Both EYES two times a day  pantoprazole  Injectable 40 milliGRAM(s) IV Push daily  insulin lispro (HumaLOG) corrective regimen sliding scale   SubCutaneous every 6 hours  tiotropium 18 MICROgram(s) Capsule 1 Capsule(s) Inhalation <User Schedule>  metoprolol Injectable 5 milliGRAM(s) IV Push every 6 hours  lidocaine 2% Jelly 10 milliLiter(s) IntraUrethral two times a day  dextrose 5% + sodium chloride 0.45% with potassium chloride 20 mEq/L 1000 milliLiter(s) (100 mL/Hr) IV Continuous <Continuous>  sodium chloride 0.9% 1000 milliLiter(s) (100 mL/Hr) IV Continuous <Continuous>  magnesium sulfate  IVPB 2 Gram(s) IV Intermittent once    MEDICATIONS  (PRN):  HYDROmorphone PCA (1 mG/mL) Rescue Clinician Bolus 0.5 milliGRAM(s) IV Push every 15 minutes PRN for Pain Scale GREATER THAN 6  naloxone Injectable 0.1 milliGRAM(s) IV Push every 3 minutes PRN For ANY of the following changes in patient status:  A. RR LESS THAN 10 breaths per minute, B. Oxygen saturation LESS THAN 90%, C. Sedation score of 6  ondansetron Injectable 4 milliGRAM(s) IV Push every 6 hours PRN Nausea  tetracaine/benzocaine/butamben Spray 1 Spray(s) Topical every 6 hours PRN pain  LORazepam   Injectable 0.5 milliGRAM(s) IV Push every 6 hours PRN Agitation    	    PHYSICAL EXAM:  T(C): 37.2 (09-07-17 @ 12:46), Max: 37.2 (09-07-17 @ 00:16)  HR: 147 (09-07-17 @ 12:46) (116 - 147)  BP: 147/88 (09-07-17 @ 12:46) (124/86 - 154/92)  RR: 20 (09-07-17 @ 12:46) (16 - 20)  SpO2: 93% (09-07-17 @ 12:46) (93% - 98%)  Wt(kg): --  I&O's Summary    06 Sep 2017 07:01  -  07 Sep 2017 07:00  --------------------------------------------------------  IN: 3625 mL / OUT: 2130 mL / NET: 1495 mL    07 Sep 2017 07:01  -  07 Sep 2017 15:29  --------------------------------------------------------  IN: 775 mL / OUT: 755 mL / NET: 20 mL        Appearance: Normal	  HEENT:   NCAT, PERRL, EOMI, +NGT  Lymphatic: No lymphadenopathy  Cardiovascular: Normal S1 S2, irregular  Respiratory: Lungs clear to auscultation BL  Psychiatry: A & O x 3, Mood & affect appropriate  Gastrointestinal:  Soft, expected tenderness, + BS, + tube  Skin: No rashes, No ecchymoses, No cyanosis	  Neurologic: Non-focal  Extremities: Normal range of motion, No clubbing, cyanosis or edema      	  LABS:	 	    CARDIAC MARKERS:                                11.8   10.08 )-----------( 147      ( 07 Sep 2017 06:24 )             37.4     09-07    142  |  103  |  19  ----------------------------<  95  4.4   |  25  |  1.08    Ca    8.9      07 Sep 2017 06:24  Phos  2.5     09-07  Mg     1.6     09-07      proBNP:   Lipid Profile:   HgA1c:   TSH:

## 2017-09-07 NOTE — PROGRESS NOTE ADULT - SUBJECTIVE AND OBJECTIVE BOX
Anesthesia Pain Management Service    SUBJECTIVE: Patient is doing ok but confused slightly per family with IV PCA.    Pain Scale Score	At rest: ___ 	With Activity: ___ 	[X ] Refer to charted pain scores    THERAPY:    [ ] IV PCA Morphine		[ ] 5 mg/mL	[ ] 1 mg/mL  [X ] IV PCA Hydromorphone	[ ] 5 mg/mL	[X ] 1 mg/mL  [ ] IV PCA Fentanyl		[ ] 50 micrograms/mL    Demand dose __0.2_ lockout __6_ (minutes) Continuous Rate _0__ Total: _5.6__  mg used (in past 24 hours)      MEDICATIONS  (STANDING):  enoxaparin Injectable 40 milliGRAM(s) SubCutaneous every 24 hours  buDESOnide 160 MICROgram(s)/formoterol 4.5 MICROgram(s) Inhaler 2 Puff(s) Inhalation two times a day  artificial  tears Solution 1 Drop(s) Both EYES two times a day  pantoprazole  Injectable 40 milliGRAM(s) IV Push daily  insulin lispro (HumaLOG) corrective regimen sliding scale   SubCutaneous every 6 hours  tiotropium 18 MICROgram(s) Capsule 1 Capsule(s) Inhalation <User Schedule>  lidocaine 2% Jelly 10 milliLiter(s) IntraUrethral two times a day  dextrose 5% + sodium chloride 0.45% with potassium chloride 20 mEq/L 1000 milliLiter(s) (100 mL/Hr) IV Continuous <Continuous>  sodium chloride 0.9% 1000 milliLiter(s) (100 mL/Hr) IV Continuous <Continuous>  sotalol 80 milliGRAM(s) Oral every 12 hours  morphine PCA (5 mG/mL) 30 milliLiter(s) PCA Continuous PCA Continuous    MEDICATIONS  (PRN):  tetracaine/benzocaine/butamben Spray 1 Spray(s) Topical every 6 hours PRN pain  LORazepam   Injectable 0.5 milliGRAM(s) IV Push every 6 hours PRN Agitation  morphine PCA (5 mG/mL) Rescue Clinician Bolus 3 milliGRAM(s) IV Push every 15 minutes PRN for Pain Scale GREATER THAN 6  naloxone Injectable 0.1 milliGRAM(s) IV Push every 3 minutes PRN For ANY of the following changes in patient status:  A. RR LESS THAN 10 breaths per minute, B. Oxygen saturation LESS THAN 90%, C. Sedation score of 6  ondansetron Injectable 4 milliGRAM(s) IV Push every 6 hours PRN Nausea  diphenhydrAMINE   Injectable 25 milliGRAM(s) IV Push every 4 hours PRN Pruritus      OBJECTIVE:    Sedation Score:	[ X] Alert	[ ] Drowsy 	[ ] Arousable	[ ] Asleep	[ ] Unresponsive    Side Effects:	[X ] None	[ ] Nausea	[ ] Vomiting	[ ] Pruritus  		[ ] Other:    Vital Signs Last 24 Hrs  T(C): 37 (07 Sep 2017 16:38), Max: 37.2 (07 Sep 2017 00:16)  T(F): 98.6 (07 Sep 2017 16:38), Max: 99 (07 Sep 2017 12:46)  HR: 137 (07 Sep 2017 16:38) (116 - 147)  BP: 148/87 (07 Sep 2017 16:38) (124/86 - 154/92)  BP(mean): --  RR: 20 (07 Sep 2017 16:38) (16 - 20)  SpO2: 94% (07 Sep 2017 16:38) (93% - 98%)    ASSESSMENT/ PLAN    Therapy to  be:	[ X] Continue   [ ] Discontinued   [ ] Change to prn Analgesics    Documentation and Verification of current medications:   [X] Done	[ ] Not done, not elligible    Comments: Change to Morphine IV PCA low dose done & family ok with plan.    Patient was seen 09-07-17 @ 18:36

## 2017-09-08 ENCOUNTER — TRANSCRIPTION ENCOUNTER (OUTPATIENT)
Age: 67
End: 2017-09-08

## 2017-09-08 LAB
AMYLASE FLD-CCNC: 18 U/L — SIGNIFICANT CHANGE UP
BUN SERPL-MCNC: 13 MG/DL — SIGNIFICANT CHANGE UP (ref 7–23)
CALCIUM SERPL-MCNC: 8.7 MG/DL — SIGNIFICANT CHANGE UP (ref 8.4–10.5)
CHLORIDE SERPL-SCNC: 105 MMOL/L — SIGNIFICANT CHANGE UP (ref 98–107)
CO2 SERPL-SCNC: 26 MMOL/L — SIGNIFICANT CHANGE UP (ref 22–31)
CREAT SERPL-MCNC: 0.83 MG/DL — SIGNIFICANT CHANGE UP (ref 0.5–1.3)
GLUCOSE SERPL-MCNC: 120 MG/DL — HIGH (ref 70–99)
HCT VFR BLD CALC: 37.1 % — LOW (ref 39–50)
HGB BLD-MCNC: 11.9 G/DL — LOW (ref 13–17)
MAGNESIUM SERPL-MCNC: 2 MG/DL — SIGNIFICANT CHANGE UP (ref 1.6–2.6)
MCHC RBC-ENTMCNC: 28.6 PG — SIGNIFICANT CHANGE UP (ref 27–34)
MCHC RBC-ENTMCNC: 32.1 % — SIGNIFICANT CHANGE UP (ref 32–36)
MCV RBC AUTO: 89.2 FL — SIGNIFICANT CHANGE UP (ref 80–100)
NRBC # FLD: 0 — SIGNIFICANT CHANGE UP
PHOSPHATE SERPL-MCNC: 2.3 MG/DL — LOW (ref 2.5–4.5)
PLATELET # BLD AUTO: 146 K/UL — LOW (ref 150–400)
PMV BLD: 11.3 FL — SIGNIFICANT CHANGE UP (ref 7–13)
POTASSIUM SERPL-MCNC: 4.2 MMOL/L — SIGNIFICANT CHANGE UP (ref 3.5–5.3)
POTASSIUM SERPL-SCNC: 4.2 MMOL/L — SIGNIFICANT CHANGE UP (ref 3.5–5.3)
RBC # BLD: 4.16 M/UL — LOW (ref 4.2–5.8)
RBC # FLD: 17.6 % — HIGH (ref 10.3–14.5)
SODIUM SERPL-SCNC: 143 MMOL/L — SIGNIFICANT CHANGE UP (ref 135–145)
WBC # BLD: 8.14 K/UL — SIGNIFICANT CHANGE UP (ref 3.8–10.5)
WBC # FLD AUTO: 8.14 K/UL — SIGNIFICANT CHANGE UP (ref 3.8–10.5)

## 2017-09-08 PROCEDURE — 71010: CPT | Mod: 26

## 2017-09-08 PROCEDURE — 71010: CPT | Mod: 26,77

## 2017-09-08 RX ORDER — IPRATROPIUM/ALBUTEROL SULFATE 18-103MCG
3 AEROSOL WITH ADAPTER (GRAM) INHALATION EVERY 6 HOURS
Qty: 0 | Refills: 0 | Status: DISCONTINUED | OUTPATIENT
Start: 2017-09-08 | End: 2017-09-09

## 2017-09-08 RX ORDER — SOTALOL HCL 120 MG
120 TABLET ORAL EVERY 12 HOURS
Qty: 0 | Refills: 0 | Status: DISCONTINUED | OUTPATIENT
Start: 2017-09-08 | End: 2017-09-08

## 2017-09-08 RX ORDER — SOTALOL HCL 120 MG
120 TABLET ORAL EVERY 12 HOURS
Qty: 0 | Refills: 0 | Status: DISCONTINUED | OUTPATIENT
Start: 2017-09-08 | End: 2017-09-11

## 2017-09-08 RX ADMIN — Medication 1 DROP(S): at 18:06

## 2017-09-08 RX ADMIN — MORPHINE SULFATE 30 MILLILITER(S): 50 CAPSULE, EXTENDED RELEASE ORAL at 07:20

## 2017-09-08 RX ADMIN — Medication 1: at 06:48

## 2017-09-08 RX ADMIN — SODIUM CHLORIDE 100 MILLILITER(S): 9 INJECTION, SOLUTION INTRAVENOUS at 18:07

## 2017-09-08 RX ADMIN — TIOTROPIUM BROMIDE 1 CAPSULE(S): 18 CAPSULE ORAL; RESPIRATORY (INHALATION) at 14:21

## 2017-09-08 RX ADMIN — BUDESONIDE AND FORMOTEROL FUMARATE DIHYDRATE 2 PUFF(S): 160; 4.5 AEROSOL RESPIRATORY (INHALATION) at 09:23

## 2017-09-08 RX ADMIN — Medication 1 DROP(S): at 06:51

## 2017-09-08 RX ADMIN — Medication 80 MILLIGRAM(S): at 11:41

## 2017-09-08 RX ADMIN — PANTOPRAZOLE SODIUM 40 MILLIGRAM(S): 20 TABLET, DELAYED RELEASE ORAL at 11:41

## 2017-09-08 RX ADMIN — DEXTROSE MONOHYDRATE, SODIUM CHLORIDE, AND POTASSIUM CHLORIDE 100 MILLILITER(S): 50; .745; 4.5 INJECTION, SOLUTION INTRAVENOUS at 02:30

## 2017-09-08 RX ADMIN — ENOXAPARIN SODIUM 40 MILLIGRAM(S): 100 INJECTION SUBCUTANEOUS at 11:41

## 2017-09-08 RX ADMIN — DEXTROSE MONOHYDRATE, SODIUM CHLORIDE, AND POTASSIUM CHLORIDE 100 MILLILITER(S): 50; .745; 4.5 INJECTION, SOLUTION INTRAVENOUS at 09:27

## 2017-09-08 RX ADMIN — MORPHINE SULFATE 30 MILLILITER(S): 50 CAPSULE, EXTENDED RELEASE ORAL at 19:20

## 2017-09-08 RX ADMIN — BUDESONIDE AND FORMOTEROL FUMARATE DIHYDRATE 2 PUFF(S): 160; 4.5 AEROSOL RESPIRATORY (INHALATION) at 21:26

## 2017-09-08 NOTE — PROGRESS NOTE ADULT - SUBJECTIVE AND OBJECTIVE BOX
D-Team (Surgical Oncology) Daily Progress Note    SUBJECTIVE:  Pt seen and examined, and is resting comfortably in bed. No acute events overnight. Pain is adequately controlled on current regimen. Pt has no complaints at this time. Negative for flatus and BM.     OBJECTIVE:  Vital Signs Last 24 Hrs  T(C): 36.7 (08 Sep 2017 11:39), Max: 37.3 (07 Sep 2017 20:11)  T(F): 98 (08 Sep 2017 11:39), Max: 99.1 (07 Sep 2017 20:11)  HR: 108 (08 Sep 2017 11:39) (95 - 141)  BP: 135/92 (08 Sep 2017 11:39) (135/92 - 158/82)  BP(mean): --  RR: 20 (08 Sep 2017 11:39) (18 - 20)  SpO2: 100% (08 Sep 2017 11:39) (94% - 100%)    I&O's Detail    07 Sep 2017 07:01  -  08 Sep 2017 07:00  --------------------------------------------------------  IN:    dextrose 5% + sodium chloride 0.45% with potassium chloride 20 mEq/L: 1400 mL    Glucerna: 240 mL    lactated ringers.: 375 mL    sodium chloride 0.9%: 700 mL  Total IN: 2715 mL    OUT:    Bulb: 120 mL    Indwelling Catheter - Urethral: 725 mL    Nasoenteral Tube: 200 mL    Voided: 1150 mL  Total OUT: 2195 mL    Total NET: 520 mL      08 Sep 2017 07:01  -  08 Sep 2017 16:11  --------------------------------------------------------  IN:  Total IN: 0 mL    OUT:    Bulb: 80 mL    Nasoenteral Tube: 100 mL    Voided: 475 mL  Total OUT: 655 mL    Total NET: -655 mL          Exam:  GEN: A&Ox3, NAD  HEENT: atraumatic, normocephalic  CV: no JVD  RESP: no increased work of breathing, no use of accessory muscles  GI/ABD: soft, appropriately tender, moderately distended, wound c/d/i, some mild skin irritation from tape  : deferred  EXTREMITIES: warm, pink, well-perfused                          11.9   8.14  )-----------( 146      ( 08 Sep 2017 06:16 )             37.1       09-08    143  |  105  |  13  ----------------------------<  120<H>  4.2   |  26  |  0.83    Ca    8.7      08 Sep 2017 06:16  Phos  2.3     09-08  Mg     2.0     09-08            ASSESSMENT:  67y Male POD#3 s/p partial gastrectomy and j tube placement    PLAN:    - NPO w/ tube feeds  - NGT to LWCS  - F/u labs  - DVT ppx  - F/u UOP  - Pain control  - No manipulation of NGT tube        Nathalie Lee MD PGY-1  pager: [phone]

## 2017-09-08 NOTE — PROGRESS NOTE ADULT - SUBJECTIVE AND OBJECTIVE BOX
Anesthesia Pain Management Service    SUBJECTIVE: Patient is doing well with IV PCA and no significant problems reported.    Pain Scale Score	At rest: ___ 	With Activity: ___ 	[X ] Refer to charted pain scores    THERAPY:    [ ] IV PCA Morphine		[ ] 5 mg/mL	[ ] 1 mg/mL  [X ] IV PCA Hydromorphone	[ ] 5 mg/mL	[X ] 1 mg/mL  [ ] IV PCA Fentanyl		[ ] 50 micrograms/mL    Demand dose __0.2_ lockout __6_ (minutes) Continuous Rate _0__ Total: ___  Daily      MEDICATIONS  (STANDING):  enoxaparin Injectable 40 milliGRAM(s) SubCutaneous every 24 hours  buDESOnide 160 MICROgram(s)/formoterol 4.5 MICROgram(s) Inhaler 2 Puff(s) Inhalation two times a day  artificial  tears Solution 1 Drop(s) Both EYES two times a day  pantoprazole  Injectable 40 milliGRAM(s) IV Push daily  insulin lispro (HumaLOG) corrective regimen sliding scale   SubCutaneous every 6 hours  tiotropium 18 MICROgram(s) Capsule 1 Capsule(s) Inhalation <User Schedule>  lidocaine 2% Jelly 10 milliLiter(s) IntraUrethral two times a day  dextrose 5% + sodium chloride 0.45% with potassium chloride 20 mEq/L 1000 milliLiter(s) (100 mL/Hr) IV Continuous <Continuous>  sodium chloride 0.9% 1000 milliLiter(s) (100 mL/Hr) IV Continuous <Continuous>  sotalol 80 milliGRAM(s) Oral every 12 hours  morphine PCA (5 mG/mL) 30 milliLiter(s) PCA Continuous PCA Continuous    MEDICATIONS  (PRN):  tetracaine/benzocaine/butamben Spray 1 Spray(s) Topical every 6 hours PRN pain  LORazepam   Injectable 0.5 milliGRAM(s) IV Push every 6 hours PRN Agitation  morphine PCA (5 mG/mL) Rescue Clinician Bolus 3 milliGRAM(s) IV Push every 15 minutes PRN for Pain Scale GREATER THAN 6  naloxone Injectable 0.1 milliGRAM(s) IV Push every 3 minutes PRN For ANY of the following changes in patient status:  A. RR LESS THAN 10 breaths per minute, B. Oxygen saturation LESS THAN 90%, C. Sedation score of 6  ondansetron Injectable 4 milliGRAM(s) IV Push every 6 hours PRN Nausea  diphenhydrAMINE   Injectable 25 milliGRAM(s) IV Push every 4 hours PRN Pruritus      OBJECTIVE:    Sedation Score:	[ X] Alert	[ ] Drowsy 	[ ] Arousable	[ ] Asleep	[ ] Unresponsive    Side Effects:	[X ] None	[ ] Nausea	[ ] Vomiting	[ ] Pruritus  		[ ] Other:    Vital Signs Last 24 Hrs  T(C): 36.7 (08 Sep 2017 11:39), Max: 37.3 (07 Sep 2017 20:11)  T(F): 98 (08 Sep 2017 11:39), Max: 99.1 (07 Sep 2017 20:11)  HR: 108 (08 Sep 2017 11:39) (95 - 141)  BP: 135/92 (08 Sep 2017 11:39) (135/92 - 158/82)  BP(mean): --  RR: 20 (08 Sep 2017 11:39) (18 - 20)  SpO2: 100% (08 Sep 2017 11:39) (94% - 100%)    ASSESSMENT/ PLAN    Therapy to  be:	[ X] Continue   [ ] Discontinued   [ ] Change to prn Analgesics    Documentation and Verification of current medications:   [X] Done	[ ] Not done, not elligible    Comments: Anesthesia Pain Management Service    SUBJECTIVE: Patient is doing well with IV PCA and no significant problems reported.    Pain Scale Score	At rest: ___ 	With Activity: ___ 	[X ] Refer to charted pain scores    THERAPY:    [ ] IV PCA Morphine		[ ] 5 mg/mL	[ ] 1 mg/mL  [X ] IV PCA Hydromorphone	[ ] 5 mg/mL	[X ] 1 mg/mL  [ ] IV PCA Fentanyl		[ ] 50 micrograms/mL    Demand dose __0.2_ lockout __6_ (minutes) Continuous Rate _0__ Total: _6.4__  Daily      MEDICATIONS  (STANDING):  enoxaparin Injectable 40 milliGRAM(s) SubCutaneous every 24 hours  buDESOnide 160 MICROgram(s)/formoterol 4.5 MICROgram(s) Inhaler 2 Puff(s) Inhalation two times a day  artificial  tears Solution 1 Drop(s) Both EYES two times a day  pantoprazole  Injectable 40 milliGRAM(s) IV Push daily  insulin lispro (HumaLOG) corrective regimen sliding scale   SubCutaneous every 6 hours  tiotropium 18 MICROgram(s) Capsule 1 Capsule(s) Inhalation <User Schedule>  lidocaine 2% Jelly 10 milliLiter(s) IntraUrethral two times a day  dextrose 5% + sodium chloride 0.45% with potassium chloride 20 mEq/L 1000 milliLiter(s) (100 mL/Hr) IV Continuous <Continuous>  sodium chloride 0.9% 1000 milliLiter(s) (100 mL/Hr) IV Continuous <Continuous>  sotalol 80 milliGRAM(s) Oral every 12 hours  morphine PCA (5 mG/mL) 30 milliLiter(s) PCA Continuous PCA Continuous    MEDICATIONS  (PRN):  tetracaine/benzocaine/butamben Spray 1 Spray(s) Topical every 6 hours PRN pain  LORazepam   Injectable 0.5 milliGRAM(s) IV Push every 6 hours PRN Agitation  morphine PCA (5 mG/mL) Rescue Clinician Bolus 3 milliGRAM(s) IV Push every 15 minutes PRN for Pain Scale GREATER THAN 6  naloxone Injectable 0.1 milliGRAM(s) IV Push every 3 minutes PRN For ANY of the following changes in patient status:  A. RR LESS THAN 10 breaths per minute, B. Oxygen saturation LESS THAN 90%, C. Sedation score of 6  ondansetron Injectable 4 milliGRAM(s) IV Push every 6 hours PRN Nausea  diphenhydrAMINE   Injectable 25 milliGRAM(s) IV Push every 4 hours PRN Pruritus      OBJECTIVE:    Sedation Score:	[ X] Alert	[ ] Drowsy 	[ ] Arousable	[ ] Asleep	[ ] Unresponsive    Side Effects:	[X ] None	[ ] Nausea	[ ] Vomiting	[ ] Pruritus  		[ ] Other:    Vital Signs Last 24 Hrs  T(C): 36.7 (08 Sep 2017 11:39), Max: 37.3 (07 Sep 2017 20:11)  T(F): 98 (08 Sep 2017 11:39), Max: 99.1 (07 Sep 2017 20:11)  HR: 108 (08 Sep 2017 11:39) (95 - 141)  BP: 135/92 (08 Sep 2017 11:39) (135/92 - 158/82)  BP(mean): --  RR: 20 (08 Sep 2017 11:39) (18 - 20)  SpO2: 100% (08 Sep 2017 11:39) (94% - 100%)    ASSESSMENT/ PLAN    Therapy to  be:	[ X] Continue   [ ] Discontinued   [ ] Change to prn Analgesics    Documentation and Verification of current medications:   [X] Done	[ ] Not done, not elligible    Comments:

## 2017-09-08 NOTE — PROGRESS NOTE ADULT - SUBJECTIVE AND OBJECTIVE BOX
CHIEF COMPLAINT:Patient is a 67y old  Male who presents with a chief complaint of "I have stomach cancer, having surgery" (22 Aug 2017 10:38)    	  Interval history: no events      Allergies:  sulfa drugs (Unknown)      PAST MEDICAL & SURGICAL HISTORY:  Memory change  Afib  BPH (benign prostatic hyperplasia)  Hepatitis B  COPD (chronic obstructive pulmonary disease)  Diabetes  Gastric cancer: chemo, has infuspaort  Dyslipidemia  History of hypertension  History of embolic filter insertion: June 2017  History of tonsillectomy      FAMILY HISTORY:  No pertinent family history in first degree relatives      REVIEW OF SYSTEMS:  CONSTITUTIONAL: No fever, weight loss, or fatigue  EYES: No eye pain, visual disturbances, or discharge  NECK: No pain or stiffness  RESPIRATORY: No cough or wheezing, no shortness of breath  CARDIOVASCULAR: No chest pain, palpitations, dizziness, or leg swelling  GASTROINTESTINAL: postop abdominal pain. No nausea, vomiting, diarrhea or constipation  GENITOURINARY: No dysuria, urinary frequency or urgency, no hematuria  NEUROLOGICAL: No headaches, memory loss, loss of strength, numbness, or tremors  SKIN: No itching, burning, rashes, or lesions   MUSCULOSKELETAL: No joint pain or swelling; No muscle, back, or extremity pain      Medications:  MEDICATIONS  (STANDING):  enoxaparin Injectable 40 milliGRAM(s) SubCutaneous every 24 hours  buDESOnide 160 MICROgram(s)/formoterol 4.5 MICROgram(s) Inhaler 2 Puff(s) Inhalation two times a day  artificial  tears Solution 1 Drop(s) Both EYES two times a day  pantoprazole  Injectable 40 milliGRAM(s) IV Push daily  insulin lispro (HumaLOG) corrective regimen sliding scale   SubCutaneous every 6 hours  tiotropium 18 MICROgram(s) Capsule 1 Capsule(s) Inhalation <User Schedule>  lidocaine 2% Jelly 10 milliLiter(s) IntraUrethral two times a day  dextrose 5% + sodium chloride 0.45% with potassium chloride 20 mEq/L 1000 milliLiter(s) (100 mL/Hr) IV Continuous <Continuous>  sodium chloride 0.9% 1000 milliLiter(s) (100 mL/Hr) IV Continuous <Continuous>  sotalol 80 milliGRAM(s) Oral every 12 hours  morphine PCA (5 mG/mL) 30 milliLiter(s) PCA Continuous PCA Continuous    MEDICATIONS  (PRN):  tetracaine/benzocaine/butamben Spray 1 Spray(s) Topical every 6 hours PRN pain  LORazepam   Injectable 0.5 milliGRAM(s) IV Push every 6 hours PRN Agitation  morphine PCA (5 mG/mL) Rescue Clinician Bolus 3 milliGRAM(s) IV Push every 15 minutes PRN for Pain Scale GREATER THAN 6  naloxone Injectable 0.1 milliGRAM(s) IV Push every 3 minutes PRN For ANY of the following changes in patient status:  A. RR LESS THAN 10 breaths per minute, B. Oxygen saturation LESS THAN 90%, C. Sedation score of 6  ondansetron Injectable 4 milliGRAM(s) IV Push every 6 hours PRN Nausea  diphenhydrAMINE   Injectable 25 milliGRAM(s) IV Push every 4 hours PRN Pruritus    	    PHYSICAL EXAM:  T(C): 36.7 (09-08-17 @ 11:39), Max: 37.3 (09-07-17 @ 20:11)  HR: 108 (09-08-17 @ 11:39) (95 - 141)  BP: 135/92 (09-08-17 @ 11:39) (135/92 - 158/82)  RR: 20 (09-08-17 @ 11:39) (18 - 20)  SpO2: 100% (09-08-17 @ 11:39) (94% - 100%)  Wt(kg): --  I&O's Summary    07 Sep 2017 07:01  -  08 Sep 2017 07:00  --------------------------------------------------------  IN: 2715 mL / OUT: 2195 mL / NET: 520 mL    08 Sep 2017 07:01  -  08 Sep 2017 15:48  --------------------------------------------------------  IN: 0 mL / OUT: 655 mL / NET: -655 mL      Appearance: Normal	  HEENT:   NCAT, PERRL, EOMI, +NGT  Lymphatic: No lymphadenopathy  Cardiovascular: Normal S1 S2, irregular  Respiratory: Lungs clear to auscultation BL  Psychiatry: A & O x 3, Mood & affect appropriate  Gastrointestinal:  Soft, expected tenderness, + BS, + tube  Skin: No rashes, No ecchymoses, No cyanosis	  Neurologic: Non-focal  Extremities: Normal range of motion, No clubbing, cyanosis or edema    	  LABS:	 	    CARDIAC MARKERS:                                11.9   8.14  )-----------( 146      ( 08 Sep 2017 06:16 )             37.1     09-08    143  |  105  |  13  ----------------------------<  120<H>  4.2   |  26  |  0.83    Ca    8.7      08 Sep 2017 06:16  Phos  2.3     09-08  Mg     2.0     09-08      proBNP:   Lipid Profile:   HgA1c:   TSH:

## 2017-09-09 LAB
BUN SERPL-MCNC: 11 MG/DL — SIGNIFICANT CHANGE UP (ref 7–23)
CALCIUM SERPL-MCNC: 8.7 MG/DL — SIGNIFICANT CHANGE UP (ref 8.4–10.5)
CHLORIDE SERPL-SCNC: 101 MMOL/L — SIGNIFICANT CHANGE UP (ref 98–107)
CO2 SERPL-SCNC: 27 MMOL/L — SIGNIFICANT CHANGE UP (ref 22–31)
CREAT SERPL-MCNC: 0.8 MG/DL — SIGNIFICANT CHANGE UP (ref 0.5–1.3)
GLUCOSE SERPL-MCNC: 130 MG/DL — HIGH (ref 70–99)
HCT VFR BLD CALC: 36.4 % — LOW (ref 39–50)
HGB BLD-MCNC: 11.5 G/DL — LOW (ref 13–17)
MAGNESIUM SERPL-MCNC: 1.7 MG/DL — SIGNIFICANT CHANGE UP (ref 1.6–2.6)
MCHC RBC-ENTMCNC: 27.6 PG — SIGNIFICANT CHANGE UP (ref 27–34)
MCHC RBC-ENTMCNC: 31.6 % — LOW (ref 32–36)
MCV RBC AUTO: 87.5 FL — SIGNIFICANT CHANGE UP (ref 80–100)
NRBC # FLD: 0 — SIGNIFICANT CHANGE UP
PHOSPHATE SERPL-MCNC: 2.8 MG/DL — SIGNIFICANT CHANGE UP (ref 2.5–4.5)
PLATELET # BLD AUTO: 172 K/UL — SIGNIFICANT CHANGE UP (ref 150–400)
PMV BLD: 11 FL — SIGNIFICANT CHANGE UP (ref 7–13)
POTASSIUM SERPL-MCNC: 3.9 MMOL/L — SIGNIFICANT CHANGE UP (ref 3.5–5.3)
POTASSIUM SERPL-SCNC: 3.9 MMOL/L — SIGNIFICANT CHANGE UP (ref 3.5–5.3)
RBC # BLD: 4.16 M/UL — LOW (ref 4.2–5.8)
RBC # FLD: 17.1 % — HIGH (ref 10.3–14.5)
SODIUM SERPL-SCNC: 139 MMOL/L — SIGNIFICANT CHANGE UP (ref 135–145)
WBC # BLD: 8.81 K/UL — SIGNIFICANT CHANGE UP (ref 3.8–10.5)
WBC # FLD AUTO: 8.81 K/UL — SIGNIFICANT CHANGE UP (ref 3.8–10.5)

## 2017-09-09 PROCEDURE — 71010: CPT | Mod: 26

## 2017-09-09 RX ORDER — IPRATROPIUM/ALBUTEROL SULFATE 18-103MCG
3 AEROSOL WITH ADAPTER (GRAM) INHALATION EVERY 4 HOURS
Qty: 0 | Refills: 0 | Status: DISCONTINUED | OUTPATIENT
Start: 2017-09-09 | End: 2017-09-09

## 2017-09-09 RX ORDER — LEVALBUTEROL 1.25 MG/.5ML
1.25 SOLUTION, CONCENTRATE RESPIRATORY (INHALATION) EVERY 6 HOURS
Qty: 0 | Refills: 0 | Status: DISCONTINUED | OUTPATIENT
Start: 2017-09-09 | End: 2017-09-14

## 2017-09-09 RX ORDER — BENZOCAINE AND MENTHOL 5; 1 G/100ML; G/100ML
1 LIQUID ORAL
Qty: 0 | Refills: 0 | Status: DISCONTINUED | OUTPATIENT
Start: 2017-09-09 | End: 2017-09-09

## 2017-09-09 RX ORDER — BENZOCAINE AND MENTHOL 5; 1 G/100ML; G/100ML
1 LIQUID ORAL
Qty: 0 | Refills: 0 | Status: DISCONTINUED | OUTPATIENT
Start: 2017-09-09 | End: 2017-09-14

## 2017-09-09 RX ORDER — DILTIAZEM HCL 120 MG
10 CAPSULE, EXT RELEASE 24 HR ORAL
Qty: 125 | Refills: 0 | Status: DISCONTINUED | OUTPATIENT
Start: 2017-09-09 | End: 2017-09-10

## 2017-09-09 RX ORDER — DILTIAZEM HCL 120 MG
5 CAPSULE, EXT RELEASE 24 HR ORAL
Qty: 125 | Refills: 0 | Status: DISCONTINUED | OUTPATIENT
Start: 2017-09-09 | End: 2017-09-09

## 2017-09-09 RX ADMIN — PANTOPRAZOLE SODIUM 40 MILLIGRAM(S): 20 TABLET, DELAYED RELEASE ORAL at 12:08

## 2017-09-09 RX ADMIN — LEVALBUTEROL 1.25 MILLIGRAM(S): 1.25 SOLUTION, CONCENTRATE RESPIRATORY (INHALATION) at 21:36

## 2017-09-09 RX ADMIN — Medication 3 MILLILITER(S): at 14:21

## 2017-09-09 RX ADMIN — BUDESONIDE AND FORMOTEROL FUMARATE DIHYDRATE 2 PUFF(S): 160; 4.5 AEROSOL RESPIRATORY (INHALATION) at 11:57

## 2017-09-09 RX ADMIN — Medication 1 SPRAY(S): at 13:58

## 2017-09-09 RX ADMIN — BUDESONIDE AND FORMOTEROL FUMARATE DIHYDRATE 2 PUFF(S): 160; 4.5 AEROSOL RESPIRATORY (INHALATION) at 21:52

## 2017-09-09 RX ADMIN — Medication 5 MG/HR: at 12:55

## 2017-09-09 RX ADMIN — Medication 10 MG/HR: at 15:01

## 2017-09-09 RX ADMIN — Medication 120 MILLIGRAM(S): at 18:58

## 2017-09-09 RX ADMIN — MORPHINE SULFATE 30 MILLILITER(S): 50 CAPSULE, EXTENDED RELEASE ORAL at 19:13

## 2017-09-09 RX ADMIN — Medication 120 MILLIGRAM(S): at 06:00

## 2017-09-09 RX ADMIN — Medication 1 DROP(S): at 06:00

## 2017-09-09 RX ADMIN — Medication 1 DROP(S): at 18:57

## 2017-09-09 RX ADMIN — SODIUM CHLORIDE 100 MILLILITER(S): 9 INJECTION, SOLUTION INTRAVENOUS at 13:50

## 2017-09-09 RX ADMIN — ENOXAPARIN SODIUM 40 MILLIGRAM(S): 100 INJECTION SUBCUTANEOUS at 14:17

## 2017-09-09 NOTE — PROGRESS NOTE ADULT - ASSESSMENT
67y Male POD4 s/p partial gastrectomy and j tube placement    PLAN:    - NPO w/ tube feeds, increase to 30  - Cardizem drip, appreciate cards reccs  - DVT ppx  - F/u UOP  - Pain control  - No manipulation of NGT tube    Seen and examined by Dr. Robertson

## 2017-09-09 NOTE — PROGRESS NOTE ADULT - SUBJECTIVE AND OBJECTIVE BOX
CHIEF COMPLAINT:Patient is a 67y old  Male who presents with a chief complaint of "I have stomach cancer, having surgery" (22 Aug 2017 10:38)  chart reviewed  no new complaints  	        PAST MEDICAL & SURGICAL HISTORY:  Memory change  Afib  BPH (benign prostatic hyperplasia)  Hepatitis B  COPD (chronic obstructive pulmonary disease)  Diabetes  Gastric cancer: chemo, has infuspaort  Dyslipidemia  History of hypertension  History of embolic filter insertion: June 2017  History of tonsillectomy          REVIEW OF SYSTEMS:  CONSTITUTIONAL: No fever, weight loss, or fatigue  EYES: No eye pain, visual disturbances, or discharge  NECK: No pain or stiffness  RESPIRATORY: No cough, wheezing, chills or hemoptysis; No Shortness of Breath  CARDIOVASCULAR: No chest pain, palpitations, passing out, dizziness, or leg swelling  GASTROINTESTINAL: No abdominal or epigastric pain. No nausea, vomiting, or hematemesis; No diarrhea or constipation. No melena or hematochezia.  GENITOURINARY: No dysuria, frequency, hematuria, or incontinence  NEUROLOGICAL: No headaches, memory loss, loss of strength, numbness, or tremors  SKIN: No itching, burning, rashes, or lesions   LYMPH Nodes: No enlarged glands  ENDOCRINE: No heat or cold intolerance; No hair loss  MUSCULOSKELETAL: No joint pain or swelling; No muscle, back, or extremity pain    Medications:  MEDICATIONS  (STANDING):  enoxaparin Injectable 40 milliGRAM(s) SubCutaneous every 24 hours  buDESOnide 160 MICROgram(s)/formoterol 4.5 MICROgram(s) Inhaler 2 Puff(s) Inhalation two times a day  artificial  tears Solution 1 Drop(s) Both EYES two times a day  pantoprazole  Injectable 40 milliGRAM(s) IV Push daily  insulin lispro (HumaLOG) corrective regimen sliding scale   SubCutaneous every 6 hours  tiotropium 18 MICROgram(s) Capsule 1 Capsule(s) Inhalation <User Schedule>  lidocaine 2% Jelly 10 milliLiter(s) IntraUrethral two times a day  dextrose 5% + sodium chloride 0.45% with potassium chloride 20 mEq/L 1000 milliLiter(s) (100 mL/Hr) IV Continuous <Continuous>  sodium chloride 0.9% 1000 milliLiter(s) (100 mL/Hr) IV Continuous <Continuous>  morphine PCA (5 mG/mL) 30 milliLiter(s) PCA Continuous PCA Continuous  sotalol 120 milliGRAM(s) Oral every 12 hours  diltiazem Infusion 10 mG/Hr (10 mL/Hr) IV Continuous <Continuous>    MEDICATIONS  (PRN):  tetracaine/benzocaine/butamben Spray 1 Spray(s) Topical every 6 hours PRN pain  LORazepam   Injectable 0.5 milliGRAM(s) IV Push every 6 hours PRN Agitation  morphine PCA (5 mG/mL) Rescue Clinician Bolus 3 milliGRAM(s) IV Push every 15 minutes PRN for Pain Scale GREATER THAN 6  naloxone Injectable 0.1 milliGRAM(s) IV Push every 3 minutes PRN For ANY of the following changes in patient status:  A. RR LESS THAN 10 breaths per minute, B. Oxygen saturation LESS THAN 90%, C. Sedation score of 6  ondansetron Injectable 4 milliGRAM(s) IV Push every 6 hours PRN Nausea  diphenhydrAMINE   Injectable 25 milliGRAM(s) IV Push every 4 hours PRN Pruritus  ALBUTerol/ipratropium for Nebulization 3 milliLiter(s) Nebulizer every 6 hours PRN Wheezing    	    PHYSICAL EXAM:  T(C): 37.5 (09-08-17 @ 20:59), Max: 37.5 (09-08-17 @ 20:59)  HR: 135 (09-08-17 @ 20:59) (134 - 135)  BP: 153/84 (09-08-17 @ 20:59) (153/84 - 155/96)  RR: 20 (09-08-17 @ 20:59) (20 - 20)  SpO2: 97% (09-08-17 @ 20:59) (97% - 97%)  Wt(kg): --  I&O's Summary    08 Sep 2017 07:01  -  09 Sep 2017 07:00  --------------------------------------------------------  IN: 1570 mL / OUT: 735 mL / NET: 835 mL    09 Sep 2017 07:01  -  09 Sep 2017 13:06  --------------------------------------------------------  IN: 780 mL / OUT: 560 mL / NET: 220 mL        Appearance: Normal	  HEENT:   Normal oral mucosa, PERRL, EOMI	  Lymphatic: No lymphadenopathy  Cardiovascular: reg irreg  No JVD, No murmurs, No edema  Respiratory: Lungs clear to auscultation	  Psychiatry: A & O x 3, Mood & affect appropriate  Gastrointestinal:  Soft, Non-tender, + BS	  Skin: No rashes, No ecchymoses, No cyanosis	  Neurologic: Non-focal  Extremities: Normal range of motion, No clubbing, cyanosis or edema  Vascular: Peripheral pulses palpable 2+ bilaterally    TELEMETRY: 	    ECG:  	  RADIOLOGY:  OTHER: 	  	  LABS:	 	    CARDIAC MARKERS:                                11.5   8.81  )-----------( 172      ( 09 Sep 2017 06:00 )             36.4     09-09    139  |  101  |  11  ----------------------------<  130<H>  3.9   |  27  |  0.80    Ca    8.7      09 Sep 2017 06:00  Phos  2.8     09-09  Mg     1.7     09-09      proBNP:   Lipid Profile:   HgA1c:   TSH:

## 2017-09-09 NOTE — CONSULT NOTE ADULT - ASSESSMENT
afib  rvr  on sotalol  start cardizem drip  resume asa once deemed safe from surgery    HTN  stable  plan as above    Dm  Monitor finger stick. Insulin coverage. Diabetic education and Diabetic diet. Consider nutrition consultation.

## 2017-09-09 NOTE — PROGRESS NOTE ADULT - ASSESSMENT
68 yo Male  w/hypertension, diabetes, A-fib, COPD, Hep B, dyslipidemia, and BPH, gastric CA s/p gastrectomy:  1. Gastric CA s/p gastrectomy + jejunostomy - management per Surgery, IVF / diet per sx , pain control, bowel regimen, incentive spirometry, DVT prophylaxis, PT/OOB, PPI  2. DM2 - off PO meds, c/w ISS  3. A fib - rate controlled, c/w IV cardizem and sotalol  wh, reports AC stopped 2/2 Watchman procedure, restart ASA when cleared by Surgery/cards to see  4. COPD  -no wheezing, c/w Symbicart + Spiriva  5. HLD - restart statin when eating  6. Dizziness - resolved  pt  oob

## 2017-09-09 NOTE — CONSULT NOTE ADULT - SUBJECTIVE AND OBJECTIVE BOX
CHIEF COMPLAINT: rapid HR    HISTORY OF PRESENT ILLNESS:  This is a pleasant gentelman with history as below  namely afib s/p watchman procedure in june 2017   gastric ca s/p Esophagogastroduodenoscopy   now with afib with rvr   No chest pain, or sob.   no dizziness or syncope       PAST MEDICAL & SURGICAL HISTORY:  Memory change  Afib  BPH (benign prostatic hyperplasia)  Hepatitis B  COPD (chronic obstructive pulmonary disease)  Diabetes  Gastric cancer: chemo, has infuspaort  Dyslipidemia  History of hypertension  History of embolic filter insertion: June 2017  History of tonsillectomy          MEDICATIONS:  enoxaparin Injectable 40 milliGRAM(s) SubCutaneous every 24 hours  sotalol 120 milliGRAM(s) Oral every 12 hours  diltiazem Infusion 10 mG/Hr IV Continuous <Continuous>      buDESOnide 160 MICROgram(s)/formoterol 4.5 MICROgram(s) Inhaler 2 Puff(s) Inhalation two times a day  tiotropium 18 MICROgram(s) Capsule 1 Capsule(s) Inhalation <User Schedule>  diphenhydrAMINE   Injectable 25 milliGRAM(s) IV Push every 4 hours PRN  ALBUTerol/ipratropium for Nebulization 3 milliLiter(s) Nebulizer every 6 hours PRN    LORazepam   Injectable 0.5 milliGRAM(s) IV Push every 6 hours PRN  morphine PCA (5 mG/mL) 30 milliLiter(s) PCA Continuous PCA Continuous  morphine PCA (5 mG/mL) Rescue Clinician Bolus 3 milliGRAM(s) IV Push every 15 minutes PRN  ondansetron Injectable 4 milliGRAM(s) IV Push every 6 hours PRN    pantoprazole  Injectable 40 milliGRAM(s) IV Push daily    insulin lispro (HumaLOG) corrective regimen sliding scale   SubCutaneous every 6 hours    artificial  tears Solution 1 Drop(s) Both EYES two times a day  tetracaine/benzocaine/butamben Spray 1 Spray(s) Topical every 6 hours PRN  dextrose 5% + sodium chloride 0.45% with potassium chloride 20 mEq/L 1000 milliLiter(s) IV Continuous <Continuous>  sodium chloride 0.9% 1000 milliLiter(s) IV Continuous <Continuous>      FAMILY HISTORY:  No pertinent family history in first degree relatives      Non-contributory    SOCIAL HISTORY:    No tobacco, drugs or etoh    Allergies    sulfa drugs (Unknown)    Intolerances    	    REVIEW OF SYSTEMS:  as above  The rest of the 14 points ROS reviewed and except above they are unremarkable.        PHYSICAL EXAM:  T(C): 37.5 (09-08-17 @ 20:59), Max: 37.5 (09-08-17 @ 20:59)  HR: 135 (09-08-17 @ 20:59) (134 - 135)  BP: 153/84 (09-08-17 @ 20:59) (153/84 - 155/96)  RR: 20 (09-08-17 @ 20:59) (20 - 20)  SpO2: 97% (09-08-17 @ 20:59) (97% - 97%)  Wt(kg): --  I&O's Summary    08 Sep 2017 07:01  -  09 Sep 2017 07:00  --------------------------------------------------------  IN: 1570 mL / OUT: 735 mL / NET: 835 mL    09 Sep 2017 07:01  -  09 Sep 2017 14:19  --------------------------------------------------------  IN: 1144 mL / OUT: 1095 mL / NET: 49 mL        Appearance: Normal	  HEENT:   Normal oral mucosa, PERRL, EOMI	  Cardiovascular: Normal S1 S2,    Murmur:   Neck: JVP normal  Respiratory: Lungs clear to auscultation  Gastrointestinal:  Soft, Non-tender, + BS	  Skin: normal   Neuro: No gross deficits.   Psychiatry:  Mood & affect appropriate  Ext: No edema    TELEMETRY: 	    ECG:  	  RADIOLOGY:  OTHER: 	  	  LABS:	 	    CARDIAC MARKERS:                                  11.5   8.81  )-----------( 172      ( 09 Sep 2017 06:00 )             36.4     09-09    139  |  101  |  11  ----------------------------<  130<H>  3.9   |  27  |  0.80    Ca    8.7      09 Sep 2017 06:00  Phos  2.8     09-09  Mg     1.7     09-09      proBNP:   Lipid Profile:   HgA1c:   TSH:

## 2017-09-09 NOTE — PROGRESS NOTE ADULT - SUBJECTIVE AND OBJECTIVE BOX
DX: s/p proximal gastrectomy, feeding jejunostomy, biopsy of liver nodule  POD#: 4          SUBJECTIVE    NAEO. Patient continues to be somewhat tachycardic, but asymptomatic. Endorses some congestion/wheezing. Pt was started on nebulizer treatment with some relief. Voiding spontaneously.  Has been OOB.     10-point review of systems completed and negative except as noted above.    enoxaparin Injectable 40 milliGRAM(s) SubCutaneous every 24 hours  buDESOnide 160 MICROgram(s)/formoterol 4.5 MICROgram(s) Inhaler 2 Puff(s) Inhalation two times a day  artificial  tears Solution 1 Drop(s) Both EYES two times a day  pantoprazole  Injectable 40 milliGRAM(s) IV Push daily  insulin lispro (HumaLOG) corrective regimen sliding scale   SubCutaneous every 6 hours  tiotropium 18 MICROgram(s) Capsule 1 Capsule(s) Inhalation <User Schedule>  lidocaine 2% Jelly 10 milliLiter(s) IntraUrethral two times a day  tetracaine/benzocaine/butamben Spray 1 Spray(s) Topical every 6 hours PRN  dextrose 5% + sodium chloride 0.45% with potassium chloride 20 mEq/L 1000 milliLiter(s) IV Continuous <Continuous>  sodium chloride 0.9% 1000 milliLiter(s) IV Continuous <Continuous>  LORazepam   Injectable 0.5 milliGRAM(s) IV Push every 6 hours PRN  morphine PCA (5 mG/mL) 30 milliLiter(s) PCA Continuous PCA Continuous  morphine PCA (5 mG/mL) Rescue Clinician Bolus 3 milliGRAM(s) IV Push every 15 minutes PRN  naloxone Injectable 0.1 milliGRAM(s) IV Push every 3 minutes PRN  ondansetron Injectable 4 milliGRAM(s) IV Push every 6 hours PRN  diphenhydrAMINE   Injectable 25 milliGRAM(s) IV Push every 4 hours PRN  sotalol 120 milliGRAM(s) Oral every 12 hours  ALBUTerol/ipratropium for Nebulization 3 milliLiter(s) Nebulizer every 6 hours PRN  diltiazem Infusion 10 mG/Hr IV Continuous <Continuous>            OBJECTIVE    T(C): 37.5 (09-08-17 @ 20:59), Max: 37.5 (09-08-17 @ 20:59)  HR: 112 (09-09-17 @ 14:21) (112 - 135)  BP: 153/84 (09-08-17 @ 20:59) (153/84 - 155/96)  RR: 20 (09-08-17 @ 20:59) (20 - 20)  SpO2: 95% (09-09-17 @ 14:21) (95% - 97%)    09-08-17 @ 07:01  -  09-09-17 @ 07:00  --------------------------------------------------------  IN: 1570 mL / OUT: 735 mL / NET: 835 mL    09-09-17 @ 07:01  -  09-09-17 @ 14:23  --------------------------------------------------------  IN: 1144 mL / OUT: 1095 mL / NET: 49 mL        EXAM  GEN: A&Ox3, NAD  HEENT: atraumatic, normocephalic  CV: no JVD  RESP: no increased work of breathing, no use of accessory muscles  GI/ABD: soft, appropriately tender, moderately distended, wound c/d/i, some mild skin irritation from tape  EXTREMITIES: warm, pink, well-perfused    LABS                        11.5   8.81  )-----------( 172      ( 09 Sep 2017 06:00 )             36.4     09-09    139  |  101  |  11  ----------------------------<  130<H>  3.9   |  27  |  0.80    Ca    8.7      09 Sep 2017 06:00  Phos  2.8     09-09  Mg     1.7     09-09

## 2017-09-10 LAB
BUN SERPL-MCNC: 11 MG/DL — SIGNIFICANT CHANGE UP (ref 7–23)
CALCIUM SERPL-MCNC: 8.6 MG/DL — SIGNIFICANT CHANGE UP (ref 8.4–10.5)
CHLORIDE SERPL-SCNC: 102 MMOL/L — SIGNIFICANT CHANGE UP (ref 98–107)
CO2 SERPL-SCNC: 26 MMOL/L — SIGNIFICANT CHANGE UP (ref 22–31)
CREAT SERPL-MCNC: 0.72 MG/DL — SIGNIFICANT CHANGE UP (ref 0.5–1.3)
GLUCOSE SERPL-MCNC: 125 MG/DL — HIGH (ref 70–99)
HCT VFR BLD CALC: 35.7 % — LOW (ref 39–50)
HGB BLD-MCNC: 11.4 G/DL — LOW (ref 13–17)
MAGNESIUM SERPL-MCNC: 1.7 MG/DL — SIGNIFICANT CHANGE UP (ref 1.6–2.6)
MCHC RBC-ENTMCNC: 28.5 PG — SIGNIFICANT CHANGE UP (ref 27–34)
MCHC RBC-ENTMCNC: 31.9 % — LOW (ref 32–36)
MCV RBC AUTO: 89.3 FL — SIGNIFICANT CHANGE UP (ref 80–100)
NRBC # FLD: 0 — SIGNIFICANT CHANGE UP
PHOSPHATE SERPL-MCNC: 3.7 MG/DL — SIGNIFICANT CHANGE UP (ref 2.5–4.5)
PLATELET # BLD AUTO: 185 K/UL — SIGNIFICANT CHANGE UP (ref 150–400)
PMV BLD: 11.2 FL — SIGNIFICANT CHANGE UP (ref 7–13)
POTASSIUM SERPL-MCNC: 3.8 MMOL/L — SIGNIFICANT CHANGE UP (ref 3.5–5.3)
POTASSIUM SERPL-SCNC: 3.8 MMOL/L — SIGNIFICANT CHANGE UP (ref 3.5–5.3)
RBC # BLD: 4 M/UL — LOW (ref 4.2–5.8)
RBC # FLD: 16.5 % — HIGH (ref 10.3–14.5)
SODIUM SERPL-SCNC: 140 MMOL/L — SIGNIFICANT CHANGE UP (ref 135–145)
WBC # BLD: 7.46 K/UL — SIGNIFICANT CHANGE UP (ref 3.8–10.5)
WBC # FLD AUTO: 7.46 K/UL — SIGNIFICANT CHANGE UP (ref 3.8–10.5)

## 2017-09-10 RX ORDER — DILTIAZEM HCL 120 MG
5 CAPSULE, EXT RELEASE 24 HR ORAL
Qty: 125 | Refills: 0 | Status: DISCONTINUED | OUTPATIENT
Start: 2017-09-10 | End: 2017-09-11

## 2017-09-10 RX ORDER — ACETAMINOPHEN 500 MG
1000 TABLET ORAL ONCE
Qty: 0 | Refills: 0 | Status: COMPLETED | OUTPATIENT
Start: 2017-09-10 | End: 2017-09-10

## 2017-09-10 RX ADMIN — Medication 1000 MILLIGRAM(S): at 21:31

## 2017-09-10 RX ADMIN — TIOTROPIUM BROMIDE 1 CAPSULE(S): 18 CAPSULE ORAL; RESPIRATORY (INHALATION) at 12:25

## 2017-09-10 RX ADMIN — MORPHINE SULFATE 30 MILLILITER(S): 50 CAPSULE, EXTENDED RELEASE ORAL at 07:18

## 2017-09-10 RX ADMIN — Medication 1 DROP(S): at 18:09

## 2017-09-10 RX ADMIN — BUDESONIDE AND FORMOTEROL FUMARATE DIHYDRATE 2 PUFF(S): 160; 4.5 AEROSOL RESPIRATORY (INHALATION) at 09:45

## 2017-09-10 RX ADMIN — Medication 120 MILLIGRAM(S): at 06:56

## 2017-09-10 RX ADMIN — Medication 400 MILLIGRAM(S): at 20:27

## 2017-09-10 RX ADMIN — Medication 5 MG/HR: at 13:13

## 2017-09-10 RX ADMIN — Medication 400 MILLIGRAM(S): at 12:41

## 2017-09-10 RX ADMIN — PANTOPRAZOLE SODIUM 40 MILLIGRAM(S): 20 TABLET, DELAYED RELEASE ORAL at 13:28

## 2017-09-10 RX ADMIN — BUDESONIDE AND FORMOTEROL FUMARATE DIHYDRATE 2 PUFF(S): 160; 4.5 AEROSOL RESPIRATORY (INHALATION) at 21:57

## 2017-09-10 RX ADMIN — LEVALBUTEROL 1.25 MILLIGRAM(S): 1.25 SOLUTION, CONCENTRATE RESPIRATORY (INHALATION) at 19:57

## 2017-09-10 RX ADMIN — Medication 120 MILLIGRAM(S): at 18:09

## 2017-09-10 RX ADMIN — Medication 1000 MILLIGRAM(S): at 13:13

## 2017-09-10 RX ADMIN — Medication 1 DROP(S): at 06:36

## 2017-09-10 RX ADMIN — ENOXAPARIN SODIUM 40 MILLIGRAM(S): 100 INJECTION SUBCUTANEOUS at 13:28

## 2017-09-10 NOTE — PROGRESS NOTE ADULT - ASSESSMENT
67y Male POD5 s/p partial gastrectomy and j tube placement    PLAN:    - NPO w/ tube feeds  - Cardizem drip, appreciate cards reccs  - DVT ppx  - F/u UOP  - Pain control  - No manipulation of NGT tube    Seen and examined with Dr. Robertson

## 2017-09-10 NOTE — PROGRESS NOTE ADULT - SUBJECTIVE AND OBJECTIVE BOX
Subjective: Patient seen and examined. No new events except as noted.     SUBJECTIVE/ROS:  No chest pain, or sob.       MEDICATIONS:  MEDICATIONS  (STANDING):  enoxaparin Injectable 40 milliGRAM(s) SubCutaneous every 24 hours  buDESOnide 160 MICROgram(s)/formoterol 4.5 MICROgram(s) Inhaler 2 Puff(s) Inhalation two times a day  artificial  tears Solution 1 Drop(s) Both EYES two times a day  pantoprazole  Injectable 40 milliGRAM(s) IV Push daily  insulin lispro (HumaLOG) corrective regimen sliding scale   SubCutaneous every 6 hours  tiotropium 18 MICROgram(s) Capsule 1 Capsule(s) Inhalation <User Schedule>  lidocaine 2% Jelly 10 milliLiter(s) IntraUrethral two times a day  dextrose 5% + sodium chloride 0.45% with potassium chloride 20 mEq/L 1000 milliLiter(s) (100 mL/Hr) IV Continuous <Continuous>  sodium chloride 0.9% 1000 milliLiter(s) (100 mL/Hr) IV Continuous <Continuous>  sotalol 120 milliGRAM(s) Oral every 12 hours  diltiazem Infusion 5 mG/Hr (5 mL/Hr) IV Continuous <Continuous>      PHYSICAL EXAM:  T(C): 36.9 (09-10-17 @ 09:13), Max: 37.1 (09-09-17 @ 16:45)  HR: 67 (09-10-17 @ 09:13) (67 - 138)  BP: 124/56 (09-10-17 @ 09:13) (124/56 - 152/76)  RR: 20 (09-10-17 @ 09:13) (20 - 20)  SpO2: 96% (09-10-17 @ 09:13) (94% - 98%)  Wt(kg): --  I&O's Summary    09 Sep 2017 07:01  -  10 Sep 2017 07:00  --------------------------------------------------------  IN: 5383 mL / OUT: 2730 mL / NET: 2653 mL    10 Sep 2017 07:01  -  10 Sep 2017 10:20  --------------------------------------------------------  IN: 330 mL / OUT: 330 mL / NET: 0 mL          Appearance: Normal	  HEENT:   Normal oral mucosa, PERRL, EOMI	  Cardiovascular: Normal S1 S2,    Murmur:   Neck: JVP normal  Respiratory: Lungs clear to auscultation  Gastrointestinal:  Soft, Non-tender, + BS	  Skin: normal   Neuro: No gross deficits.   Psychiatry:  Mood & affect appropriate  Ext: No edema        LABS:    CARDIAC MARKERS:                                11.4   7.46  )-----------( 185      ( 10 Sep 2017 06:00 )             35.7     09-10    140  |  102  |  11  ----------------------------<  125<H>  3.8   |  26  |  0.72    Ca    8.6      10 Sep 2017 06:00  Phos  3.7     09-10  Mg     1.7     09-10      proBNP:   Lipid Profile:   HgA1c:   TSH:           TELEMETRY: 	    ECG:  	  RADIOLOGY:   DIAGNOSTIC TESTING:  Echocardiogram:  Catheterization:  Stress Test:    OTHER:

## 2017-09-10 NOTE — PROGRESS NOTE ADULT - SUBJECTIVE AND OBJECTIVE BOX
DX: s/p proximal gastrectomy, feeding jejunostomy, biopsy of liver nodule  POD#: 5          SUBJECTIVE    NAEO. Tachycardia resolved on cardizem drip. Feels well overall, pain well controlled. Has been OOB to chair.     10-point review of systems completed and negative except as noted above.    enoxaparin Injectable 40 milliGRAM(s) SubCutaneous every 24 hours  buDESOnide 160 MICROgram(s)/formoterol 4.5 MICROgram(s) Inhaler 2 Puff(s) Inhalation two times a day  artificial  tears Solution 1 Drop(s) Both EYES two times a day  pantoprazole  Injectable 40 milliGRAM(s) IV Push daily  insulin lispro (HumaLOG) corrective regimen sliding scale   SubCutaneous every 6 hours  tiotropium 18 MICROgram(s) Capsule 1 Capsule(s) Inhalation <User Schedule>  lidocaine 2% Jelly 10 milliLiter(s) IntraUrethral two times a day  tetracaine/benzocaine/butamben Spray 1 Spray(s) Topical every 6 hours PRN  dextrose 5% + sodium chloride 0.45% with potassium chloride 20 mEq/L 1000 milliLiter(s) IV Continuous <Continuous>  sodium chloride 0.9% 1000 milliLiter(s) IV Continuous <Continuous>  LORazepam   Injectable 0.5 milliGRAM(s) IV Push every 6 hours PRN  ondansetron Injectable 4 milliGRAM(s) IV Push every 6 hours PRN  diphenhydrAMINE   Injectable 25 milliGRAM(s) IV Push every 4 hours PRN  sotalol 120 milliGRAM(s) Oral every 12 hours  levalbuterol Inhalation 1.25 milliGRAM(s) Inhalation every 6 hours PRN  benzocaine 15 mG/menthol 3.6 mG Lozenge 1 Lozenge Oral every 3 hours PRN  diltiazem Infusion 5 mG/Hr IV Continuous <Continuous>            OBJECTIVE    T(C): 36.8 (09-10-17 @ 12:08), Max: 37.1 (09-09-17 @ 16:45)  HR: 56 (09-10-17 @ 12:08) (56 - 138)  BP: 133/86 (09-10-17 @ 12:08) (124/56 - 152/76)  RR: 169 (09-10-17 @ 12:08) (20 - 169)  SpO2: 98% (09-10-17 @ 12:08) (94% - 98%)    09-09-17 @ 07:01  -  09-10-17 @ 07:00  --------------------------------------------------------  IN: 5383 mL / OUT: 2730 mL / NET: 2653 mL    09-10-17 @ 07:01  -  09-10-17 @ 15:31  --------------------------------------------------------  IN: 645 mL / OUT: 1010 mL / NET: -365 mL        EXAM  GEN: A&Ox3, NAD  HEENT: atraumatic, normocephalic  CV: no JVD  RESP: no increased work of breathing, no use of accessory muscles  GI/ABD: soft, appropriately tender, moderately distended, wound c/d/i, some mild skin irritation from tape  EXTREMITIES: warm, pink, well-perfused    LABS                        11.4   7.46  )-----------( 185      ( 10 Sep 2017 06:00 )             35.7     09-10    140  |  102  |  11  ----------------------------<  125<H>  3.8   |  26  |  0.72    Ca    8.6      10 Sep 2017 06:00  Phos  3.7     09-10  Mg     1.7     09-10

## 2017-09-10 NOTE — PROGRESS NOTE ADULT - ASSESSMENT
66 yo Male  w/hypertension, diabetes, A-fib, COPD, Hep B, dyslipidemia, and BPH, gastric CA s/p gastrectomy:  1. Gastric CA s/p gastrectomy + jejunostomy - management per Surgery, IVF / diet per sx , pain control, bowel regimen, incentive spirometry, DVT prophylaxis, PT/OOB, PPI  2. DM2 - off PO meds, c/w ISS  3. A fib - rate controlled, c/w IV cardizem and sotalol  /restart ASA when cleared by Surgery/cards eval noted  4. COPD  -no wheezing, c/w Symbicart + Spiriva  5. HLD - restart statin when eating  6. Dizziness - resolved  pt  oob

## 2017-09-10 NOTE — PROGRESS NOTE ADULT - ASSESSMENT
afib  HR better now, a bit on low side  on sotalol  dec cardizem to 5 mg / hr  resume asa once deemed safe from surgery    HTN  stable  plan as above    Dm  Monitor finger stick. Insulin coverage. Diabetic education and Diabetic diet. Consider nutrition consultation.

## 2017-09-11 LAB
BUN SERPL-MCNC: 10 MG/DL — SIGNIFICANT CHANGE UP (ref 7–23)
CALCIUM SERPL-MCNC: 8.9 MG/DL — SIGNIFICANT CHANGE UP (ref 8.4–10.5)
CHLORIDE SERPL-SCNC: 106 MMOL/L — SIGNIFICANT CHANGE UP (ref 98–107)
CO2 SERPL-SCNC: 26 MMOL/L — SIGNIFICANT CHANGE UP (ref 22–31)
CREAT SERPL-MCNC: 0.75 MG/DL — SIGNIFICANT CHANGE UP (ref 0.5–1.3)
GLUCOSE SERPL-MCNC: 103 MG/DL — HIGH (ref 70–99)
HCT VFR BLD CALC: 34.4 % — LOW (ref 39–50)
HGB BLD-MCNC: 11.2 G/DL — LOW (ref 13–17)
MAGNESIUM SERPL-MCNC: 1.7 MG/DL — SIGNIFICANT CHANGE UP (ref 1.6–2.6)
MCHC RBC-ENTMCNC: 28.5 PG — SIGNIFICANT CHANGE UP (ref 27–34)
MCHC RBC-ENTMCNC: 32.6 % — SIGNIFICANT CHANGE UP (ref 32–36)
MCV RBC AUTO: 87.5 FL — SIGNIFICANT CHANGE UP (ref 80–100)
NRBC # FLD: 0 — SIGNIFICANT CHANGE UP
PHOSPHATE SERPL-MCNC: 3.7 MG/DL — SIGNIFICANT CHANGE UP (ref 2.5–4.5)
PLATELET # BLD AUTO: 207 K/UL — SIGNIFICANT CHANGE UP (ref 150–400)
PMV BLD: 10.9 FL — SIGNIFICANT CHANGE UP (ref 7–13)
POTASSIUM SERPL-MCNC: 4.1 MMOL/L — SIGNIFICANT CHANGE UP (ref 3.5–5.3)
POTASSIUM SERPL-SCNC: 4.1 MMOL/L — SIGNIFICANT CHANGE UP (ref 3.5–5.3)
RBC # BLD: 3.93 M/UL — LOW (ref 4.2–5.8)
RBC # FLD: 16.4 % — HIGH (ref 10.3–14.5)
SODIUM SERPL-SCNC: 145 MMOL/L — SIGNIFICANT CHANGE UP (ref 135–145)
SURGICAL PATHOLOGY STUDY: SIGNIFICANT CHANGE UP
WBC # BLD: 6.18 K/UL — SIGNIFICANT CHANGE UP (ref 3.8–10.5)
WBC # FLD AUTO: 6.18 K/UL — SIGNIFICANT CHANGE UP (ref 3.8–10.5)

## 2017-09-11 PROCEDURE — 74220 X-RAY XM ESOPHAGUS 1CNTRST: CPT | Mod: 26

## 2017-09-11 RX ORDER — HYDROMORPHONE HYDROCHLORIDE 2 MG/ML
0.5 INJECTION INTRAMUSCULAR; INTRAVENOUS; SUBCUTANEOUS EVERY 6 HOURS
Qty: 0 | Refills: 0 | Status: DISCONTINUED | OUTPATIENT
Start: 2017-09-11 | End: 2017-09-12

## 2017-09-11 RX ORDER — ACETAMINOPHEN 500 MG
650 TABLET ORAL EVERY 6 HOURS
Qty: 0 | Refills: 0 | Status: DISCONTINUED | OUTPATIENT
Start: 2017-09-11 | End: 2017-09-14

## 2017-09-11 RX ORDER — INSULIN LISPRO 100/ML
VIAL (ML) SUBCUTANEOUS
Qty: 0 | Refills: 0 | Status: DISCONTINUED | OUTPATIENT
Start: 2017-09-11 | End: 2017-09-14

## 2017-09-11 RX ORDER — SOTALOL HCL 120 MG
120 TABLET ORAL EVERY 12 HOURS
Qty: 0 | Refills: 0 | Status: DISCONTINUED | OUTPATIENT
Start: 2017-09-11 | End: 2017-09-14

## 2017-09-11 RX ORDER — HYDROMORPHONE HYDROCHLORIDE 2 MG/ML
1 INJECTION INTRAMUSCULAR; INTRAVENOUS; SUBCUTANEOUS EVERY 6 HOURS
Qty: 0 | Refills: 0 | Status: DISCONTINUED | OUTPATIENT
Start: 2017-09-11 | End: 2017-09-12

## 2017-09-11 RX ORDER — MAGNESIUM SULFATE 500 MG/ML
2 VIAL (ML) INJECTION ONCE
Qty: 0 | Refills: 0 | Status: COMPLETED | OUTPATIENT
Start: 2017-09-11 | End: 2017-09-11

## 2017-09-11 RX ADMIN — Medication 5 MG/HR: at 18:57

## 2017-09-11 RX ADMIN — Medication 5 MG/HR: at 12:52

## 2017-09-11 RX ADMIN — Medication 50 GRAM(S): at 10:34

## 2017-09-11 RX ADMIN — ENOXAPARIN SODIUM 40 MILLIGRAM(S): 100 INJECTION SUBCUTANEOUS at 12:52

## 2017-09-11 RX ADMIN — Medication 1 DROP(S): at 06:04

## 2017-09-11 RX ADMIN — Medication 120 MILLIGRAM(S): at 18:36

## 2017-09-11 RX ADMIN — Medication 120 MILLIGRAM(S): at 06:12

## 2017-09-11 RX ADMIN — HYDROMORPHONE HYDROCHLORIDE 0.5 MILLIGRAM(S): 2 INJECTION INTRAMUSCULAR; INTRAVENOUS; SUBCUTANEOUS at 04:31

## 2017-09-11 RX ADMIN — PANTOPRAZOLE SODIUM 40 MILLIGRAM(S): 20 TABLET, DELAYED RELEASE ORAL at 12:52

## 2017-09-11 RX ADMIN — BUDESONIDE AND FORMOTEROL FUMARATE DIHYDRATE 2 PUFF(S): 160; 4.5 AEROSOL RESPIRATORY (INHALATION) at 20:32

## 2017-09-11 RX ADMIN — Medication 0.5 MILLIGRAM(S): at 22:12

## 2017-09-11 RX ADMIN — DEXTROSE MONOHYDRATE, SODIUM CHLORIDE, AND POTASSIUM CHLORIDE 100 MILLILITER(S): 50; .745; 4.5 INJECTION, SOLUTION INTRAVENOUS at 18:57

## 2017-09-11 RX ADMIN — DEXTROSE MONOHYDRATE, SODIUM CHLORIDE, AND POTASSIUM CHLORIDE 100 MILLILITER(S): 50; .745; 4.5 INJECTION, SOLUTION INTRAVENOUS at 12:53

## 2017-09-11 RX ADMIN — Medication 1 SPRAY(S): at 10:36

## 2017-09-11 RX ADMIN — BUDESONIDE AND FORMOTEROL FUMARATE DIHYDRATE 2 PUFF(S): 160; 4.5 AEROSOL RESPIRATORY (INHALATION) at 10:34

## 2017-09-11 RX ADMIN — BENZOCAINE AND MENTHOL 1 LOZENGE: 5; 1 LIQUID ORAL at 20:53

## 2017-09-11 RX ADMIN — HYDROMORPHONE HYDROCHLORIDE 0.5 MILLIGRAM(S): 2 INJECTION INTRAMUSCULAR; INTRAVENOUS; SUBCUTANEOUS at 02:50

## 2017-09-11 RX ADMIN — Medication 1 DROP(S): at 18:57

## 2017-09-11 NOTE — PROVIDER CONTACT NOTE (OTHER) - ACTION/TREATMENT ORDERED:
Surg D team made aware; no new orders given at this time; will continue to monitor.
Surg D team Giulia made aware; Zofran was given as per orders.
Surg D team made aware; no new orders given at this time; will continue to monitor
As per MD Souza, Pt NGT to be discontinued today. Monitor pt for now.

## 2017-09-11 NOTE — PROGRESS NOTE ADULT - SUBJECTIVE AND OBJECTIVE BOX
Subjective: Patient seen and examined. No new events except as noted.     SUBJECTIVE/ROS:    No chest pain, or sob.   took his NGT out    MEDICATIONS:  MEDICATIONS  (STANDING):  enoxaparin Injectable 40 milliGRAM(s) SubCutaneous every 24 hours  buDESOnide 160 MICROgram(s)/formoterol 4.5 MICROgram(s) Inhaler 2 Puff(s) Inhalation two times a day  artificial  tears Solution 1 Drop(s) Both EYES two times a day  pantoprazole  Injectable 40 milliGRAM(s) IV Push daily  insulin lispro (HumaLOG) corrective regimen sliding scale   SubCutaneous every 6 hours  tiotropium 18 MICROgram(s) Capsule 1 Capsule(s) Inhalation <User Schedule>  lidocaine 2% Jelly 10 milliLiter(s) IntraUrethral two times a day  dextrose 5% + sodium chloride 0.45% with potassium chloride 20 mEq/L 1000 milliLiter(s) (100 mL/Hr) IV Continuous <Continuous>  sotalol 120 milliGRAM(s) Oral every 12 hours  diltiazem Infusion 5 mG/Hr (5 mL/Hr) IV Continuous <Continuous>      PHYSICAL EXAM:  T(C): 36.3 (09-11-17 @ 09:52), Max: 36.8 (09-10-17 @ 12:08)  HR: 125 (09-11-17 @ 09:52) (56 - 125)  BP: 136/72 (09-11-17 @ 09:52) (133/86 - 159/-)  RR: 18 (09-11-17 @ 09:52) (18 - 169)  SpO2: 100% (09-11-17 @ 09:52) (95% - 100%)  Wt(kg): --  I&O's Summary    10 Sep 2017 07:01  -  11 Sep 2017 07:00  --------------------------------------------------------  IN: 2040 mL / OUT: 2620 mL / NET: -580 mL    11 Sep 2017 07:01  -  11 Sep 2017 11:04  --------------------------------------------------------  IN: 0 mL / OUT: 300 mL / NET: -300 mL          Appearance: Normal	  HEENT:   Normal oral mucosa, PERRL, EOMI	  Cardiovascular: Normal S1 S2,    Murmur:   Neck: JVP normal  Respiratory: Lungs clear to auscultation  Gastrointestinal:  Soft, Non-tender, + BS	  Skin: normal   Neuro: No gross deficits.   Psychiatry:  Mood & affect appropriate  Ext: No edema        LABS:    CARDIAC MARKERS:                                11.2   6.18  )-----------( 207      ( 11 Sep 2017 06:54 )             34.4     09-11    145  |  106  |  10  ----------------------------<  103<H>  4.1   |  26  |  0.75    Ca    8.9      11 Sep 2017 06:54  Phos  3.7     09-11  Mg     1.7     09-11      proBNP:   Lipid Profile:   HgA1c:   TSH:           TELEMETRY: 	    ECG:  	  RADIOLOGY:   DIAGNOSTIC TESTING:  Echocardiogram:  Catheterization:  Stress Test:    OTHER:

## 2017-09-11 NOTE — PROGRESS NOTE ADULT - ASSESSMENT
66 yo Male  w/hypertension, diabetes, A-fib, COPD, Hep B, dyslipidemia, and BPH, gastric CA s/p gastrectomy:  1. Gastric CA s/p gastrectomy + jejunostomy - management per Surgery, IVF / diet per sx , pain control, bowel regimen, incentive spirometry, DVT prophylaxis, PT/OOB, PPI  2. DM2 - off PO meds, c/w ISS  3. A fib - rate controlled, c/w IV cardizem and sotalol  /restart ASA when cleared by Surgery/cards eval noted  4. COPD  -no wheezing, c/w Symbicart + Spiriva  5. HLD - restart statin when eating  6. Dizziness - resolved

## 2017-09-11 NOTE — PROVIDER CONTACT NOTE (OTHER) - REASON
Pt pulled NGT
pts heart rate went up to the 140s; NG tube output is reddish
Pt complaining of nausea
Pts heart rate is in the 120s

## 2017-09-11 NOTE — PROGRESS NOTE ADULT - SUBJECTIVE AND OBJECTIVE BOX
DX: s/p proximal gastrectomy, feeding jejunostomy, biopsy of liver nodule  POD#: 6      SUBJECTIVE    NAEO. Patient feels well overall. HR ranging from  on Tele, rate controlled a-fib. Pain well controlled. Has been OOB. Voiding spontaneously.     10-point review of systems completed and negative except as noted above.    enoxaparin Injectable 40 milliGRAM(s) SubCutaneous every 24 hours  buDESOnide 160 MICROgram(s)/formoterol 4.5 MICROgram(s) Inhaler 2 Puff(s) Inhalation two times a day  artificial  tears Solution 1 Drop(s) Both EYES two times a day  pantoprazole  Injectable 40 milliGRAM(s) IV Push daily  insulin lispro (HumaLOG) corrective regimen sliding scale   SubCutaneous every 6 hours  tiotropium 18 MICROgram(s) Capsule 1 Capsule(s) Inhalation <User Schedule>  lidocaine 2% Jelly 10 milliLiter(s) IntraUrethral two times a day  tetracaine/benzocaine/butamben Spray 1 Spray(s) Topical every 6 hours PRN  dextrose 5% + sodium chloride 0.45% with potassium chloride 20 mEq/L 1000 milliLiter(s) IV Continuous <Continuous>  sodium chloride 0.9% 1000 milliLiter(s) IV Continuous <Continuous>  LORazepam   Injectable 0.5 milliGRAM(s) IV Push every 6 hours PRN  ondansetron Injectable 4 milliGRAM(s) IV Push every 6 hours PRN  diphenhydrAMINE   Injectable 25 milliGRAM(s) IV Push every 4 hours PRN  sotalol 120 milliGRAM(s) Oral every 12 hours  levalbuterol Inhalation 1.25 milliGRAM(s) Inhalation every 6 hours PRN  benzocaine 15 mG/menthol 3.6 mG Lozenge 1 Lozenge Oral every 3 hours PRN  diltiazem Infusion 5 mG/Hr IV Continuous <Continuous>  HYDROmorphone  Injectable 0.5 milliGRAM(s) IV Push every 6 hours PRN  HYDROmorphone  Injectable 1 milliGRAM(s) IV Push every 6 hours PRN            OBJECTIVE    T(C): 36.6 (09-11-17 @ 01:02), Max: 36.9 (09-10-17 @ 09:13)  HR: 115 (09-11-17 @ 01:02) (56 - 119)  BP: 137/104 (09-11-17 @ 01:02) (124/56 - 156/88)  RR: 18 (09-11-17 @ 01:02) (18 - 169)  SpO2: 100% (09-11-17 @ 01:02) (95% - 100%)    09-09-17 @ 07:01  -  09-10-17 @ 07:00  --------------------------------------------------------  IN: 5383 mL / OUT: 2730 mL / NET: 2653 mL    09-10-17 @ 07:01  -  09-11-17 @ 03:46  --------------------------------------------------------  IN: 2025 mL / OUT: 1870 mL / NET: 155 mL        EXAM  GEN: A&Ox3, NAD  HEENT: atraumatic, normocephalic  CV: no JVD  RESP: no increased work of breathing, no use of accessory muscles  GI/ABD: soft, appropriately tender, moderately distended, wound c/d/i, some mild skin irritation from tape  EXTREMITIES: warm, pink, well-perfused    LABS                        11.4   7.46  )-----------( 185      ( 10 Sep 2017 06:00 )             35.7     09-10    140  |  102  |  11  ----------------------------<  125<H>  3.8   |  26  |  0.72    Ca    8.6      10 Sep 2017 06:00  Phos  3.7     09-10  Mg     1.7     09-10

## 2017-09-11 NOTE — PROGRESS NOTE ADULT - ASSESSMENT
afib  HR a bit high today  on sotalol  change cardizem to PO 60 q6  resume asa once deemed safe from surgery    HTN  stable  plan as above    Dm  Monitor finger stick. Insulin coverage. Diabetic education and Diabetic diet. Consider nutrition consultation.

## 2017-09-11 NOTE — PROGRESS NOTE ADULT - SUBJECTIVE AND OBJECTIVE BOX
CHIEF COMPLAINT:Patient is a 67y old  Male who presents with a chief complaint of "I have stomach cancer, having surgery" (22 Aug 2017 10:38)    NGT out      PAST MEDICAL & SURGICAL HISTORY:  Memory change  Afib  BPH (benign prostatic hyperplasia)  Hepatitis B  COPD (chronic obstructive pulmonary disease)  Diabetes  Gastric cancer: chemo, has infuspaort  Dyslipidemia  History of hypertension  History of embolic filter insertion: June 2017  History of tonsillectomy          REVIEW OF SYSTEMS:  CONSTITUTIONAL: No fever, weight loss, or fatigue  EYES: No eye pain, visual disturbances, or discharge  NECK: No pain or stiffness  RESPIRATORY: No cough, wheezing, chills or hemoptysis; No Shortness of Breath  CARDIOVASCULAR: No chest pain, palpitations, passing out, dizziness, or leg swelling  GASTROINTESTINAL: No abdominal or epigastric pain. No nausea, vomiting, or hematemesis; No diarrhea or constipation. No melena or hematochezia.  GENITOURINARY: No dysuria, frequency, hematuria, or incontinence  NEUROLOGICAL: No headaches, memory loss, loss of strength, numbness, or tremors  SKIN: No itching, burning, rashes, or lesions   LYMPH Nodes: No enlarged glands  ENDOCRINE: No heat or cold intolerance; No hair loss  MUSCULOSKELETAL: No joint pain or swelling; No muscle, back, or extremity pain      Medications:  MEDICATIONS  (STANDING):  enoxaparin Injectable 40 milliGRAM(s) SubCutaneous every 24 hours  buDESOnide 160 MICROgram(s)/formoterol 4.5 MICROgram(s) Inhaler 2 Puff(s) Inhalation two times a day  artificial  tears Solution 1 Drop(s) Both EYES two times a day  pantoprazole  Injectable 40 milliGRAM(s) IV Push daily  insulin lispro (HumaLOG) corrective regimen sliding scale   SubCutaneous every 6 hours  tiotropium 18 MICROgram(s) Capsule 1 Capsule(s) Inhalation <User Schedule>  lidocaine 2% Jelly 10 milliLiter(s) IntraUrethral two times a day  dextrose 5% + sodium chloride 0.45% with potassium chloride 20 mEq/L 1000 milliLiter(s) (100 mL/Hr) IV Continuous <Continuous>  sotalol 120 milliGRAM(s) Oral every 12 hours  diltiazem Infusion 5 mG/Hr (5 mL/Hr) IV Continuous <Continuous>  diltiazem    Tablet 60 milliGRAM(s) Oral every 6 hours    MEDICATIONS  (PRN):  tetracaine/benzocaine/butamben Spray 1 Spray(s) Topical every 6 hours PRN pain  LORazepam   Injectable 0.5 milliGRAM(s) IV Push every 6 hours PRN Agitation  ondansetron Injectable 4 milliGRAM(s) IV Push every 6 hours PRN Nausea  diphenhydrAMINE   Injectable 25 milliGRAM(s) IV Push every 4 hours PRN Pruritus  levalbuterol Inhalation 1.25 milliGRAM(s) Inhalation every 6 hours PRN wheezing  benzocaine 15 mG/menthol 3.6 mG Lozenge 1 Lozenge Oral every 3 hours PRN Sore Throat  HYDROmorphone  Injectable 0.5 milliGRAM(s) IV Push every 6 hours PRN Moderate Pain (4 - 6)  HYDROmorphone  Injectable 1 milliGRAM(s) IV Push every 6 hours PRN Severe Pain (7 - 10)    	    PHYSICAL EXAM:  T(C): 36.8 (09-11-17 @ 13:47), Max: 36.8 (09-11-17 @ 13:47)  HR: 134 (09-11-17 @ 15:10) (84 - 135)  BP: 164/105 (09-11-17 @ 15:10) (136/72 - 164/105)  RR: 18 (09-11-17 @ 13:47) (18 - 18)  SpO2: 100% (09-11-17 @ 13:47) (95% - 100%)  Wt(kg): --  I&O's Summary    10 Sep 2017 07:01  -  11 Sep 2017 07:00  --------------------------------------------------------  IN: 2040 mL / OUT: 2620 mL / NET: -580 mL    11 Sep 2017 07:01  -  11 Sep 2017 15:25  --------------------------------------------------------  IN: 0 mL / OUT: 550 mL / NET: -550 mL      Appearance: Normal	  HEENT:   Normal oral mucosa, PERRL, EOMI  Lymphatic: No lymphadenopathy  Cardiovascular: Normal S1 S2, No JVD, No murmurs, No edema  Respiratory: Lungs clear to auscultation	  Psychiatry: A & O x 3, Mood & affect appropriate  Gastrointestinal:  Soft mild -tender, + BS	  Skin: No rashes, No ecchymoses, No cyanosis	  Neurologic: Non-focal  Extremities: Normal range of motion, No clubbing, cyanosis or edema  Vascular: Peripheral pulses palpable 2+ bilaterally    	  LABS:	 	    CARDIAC MARKERS:                                11.2   6.18  )-----------( 207      ( 11 Sep 2017 06:54 )             34.4     09-11    145  |  106  |  10  ----------------------------<  103<H>  4.1   |  26  |  0.75    Ca    8.9      11 Sep 2017 06:54  Phos  3.7     09-11  Mg     1.7     09-11      proBNP:   Lipid Profile:   HgA1c:   TSH:

## 2017-09-11 NOTE — PROVIDER CONTACT NOTE (OTHER) - BACKGROUND
Pt s/p Robotic assisted gastrectomy and J tube placement. Pt had speech and swallow done at Radiology

## 2017-09-11 NOTE — PROVIDER CONTACT NOTE (OTHER) - ASSESSMENT
Pt resting in bed; no complaints of pain or discomfort.
Pt denies pain and discomfort. Pt denies N/V and abdominal fullness. Pt states he feels "Strong"
Pt resting in bed
Pt sitting up in bed; no complaints of pain or discomfort

## 2017-09-11 NOTE — PROGRESS NOTE ADULT - ASSESSMENT
67y Male POD6 s/p partial gastrectomy and j tube placement    - NPO w/ tube feeds  - Cardizem drip, appreciate cards reccs  - DVT ppx  - Pain control  - No manipulation of NGT tube 67y Male POD6 s/p partial gastrectomy and j tube placement    - NPO w/ tube feeds  - Cardizem drip, appreciate cards reccs  - DVT ppx  - Pain control  - No manipulation of NGT tube  - Esophagram today

## 2017-09-12 LAB
AMYLASE FLD-CCNC: 28 U/L — SIGNIFICANT CHANGE UP
BUN SERPL-MCNC: 9 MG/DL — SIGNIFICANT CHANGE UP (ref 7–23)
CALCIUM SERPL-MCNC: 9.1 MG/DL — SIGNIFICANT CHANGE UP (ref 8.4–10.5)
CHLORIDE SERPL-SCNC: 107 MMOL/L — SIGNIFICANT CHANGE UP (ref 98–107)
CO2 SERPL-SCNC: 22 MMOL/L — SIGNIFICANT CHANGE UP (ref 22–31)
CREAT SERPL-MCNC: 0.82 MG/DL — SIGNIFICANT CHANGE UP (ref 0.5–1.3)
GLUCOSE SERPL-MCNC: 143 MG/DL — HIGH (ref 70–99)
HCT VFR BLD CALC: 35.7 % — LOW (ref 39–50)
HGB BLD-MCNC: 11.5 G/DL — LOW (ref 13–17)
MAGNESIUM SERPL-MCNC: 1.9 MG/DL — SIGNIFICANT CHANGE UP (ref 1.6–2.6)
MCHC RBC-ENTMCNC: 28.5 PG — SIGNIFICANT CHANGE UP (ref 27–34)
MCHC RBC-ENTMCNC: 32.2 % — SIGNIFICANT CHANGE UP (ref 32–36)
MCV RBC AUTO: 88.6 FL — SIGNIFICANT CHANGE UP (ref 80–100)
NRBC # FLD: 0 — SIGNIFICANT CHANGE UP
PHOSPHATE SERPL-MCNC: 3.4 MG/DL — SIGNIFICANT CHANGE UP (ref 2.5–4.5)
PLATELET # BLD AUTO: 211 K/UL — SIGNIFICANT CHANGE UP (ref 150–400)
PMV BLD: 11.7 FL — SIGNIFICANT CHANGE UP (ref 7–13)
POTASSIUM SERPL-MCNC: 4.1 MMOL/L — SIGNIFICANT CHANGE UP (ref 3.5–5.3)
POTASSIUM SERPL-SCNC: 4.1 MMOL/L — SIGNIFICANT CHANGE UP (ref 3.5–5.3)
RBC # BLD: 4.03 M/UL — LOW (ref 4.2–5.8)
RBC # FLD: 16.3 % — HIGH (ref 10.3–14.5)
SODIUM SERPL-SCNC: 144 MMOL/L — SIGNIFICANT CHANGE UP (ref 135–145)
WBC # BLD: 6.77 K/UL — SIGNIFICANT CHANGE UP (ref 3.8–10.5)
WBC # FLD AUTO: 6.77 K/UL — SIGNIFICANT CHANGE UP (ref 3.8–10.5)

## 2017-09-12 RX ORDER — ASPIRIN/CALCIUM CARB/MAGNESIUM 324 MG
81 TABLET ORAL DAILY
Qty: 0 | Refills: 0 | Status: DISCONTINUED | OUTPATIENT
Start: 2017-09-12 | End: 2017-09-14

## 2017-09-12 RX ORDER — PANTOPRAZOLE SODIUM 20 MG/1
40 TABLET, DELAYED RELEASE ORAL
Qty: 0 | Refills: 0 | Status: DISCONTINUED | OUTPATIENT
Start: 2017-09-12 | End: 2017-09-14

## 2017-09-12 RX ORDER — BACITRACIN ZINC 500 UNIT/G
1 OINTMENT IN PACKET (EA) TOPICAL
Qty: 0 | Refills: 0 | Status: DISCONTINUED | OUTPATIENT
Start: 2017-09-12 | End: 2017-09-14

## 2017-09-12 RX ORDER — ATORVASTATIN CALCIUM 80 MG/1
20 TABLET, FILM COATED ORAL AT BEDTIME
Qty: 0 | Refills: 0 | Status: DISCONTINUED | OUTPATIENT
Start: 2017-09-12 | End: 2017-09-14

## 2017-09-12 RX ADMIN — Medication 650 MILLIGRAM(S): at 17:31

## 2017-09-12 RX ADMIN — TIOTROPIUM BROMIDE 1 CAPSULE(S): 18 CAPSULE ORAL; RESPIRATORY (INHALATION) at 12:26

## 2017-09-12 RX ADMIN — Medication 120 MILLIGRAM(S): at 17:24

## 2017-09-12 RX ADMIN — Medication 120 MILLIGRAM(S): at 05:45

## 2017-09-12 RX ADMIN — BUDESONIDE AND FORMOTEROL FUMARATE DIHYDRATE 2 PUFF(S): 160; 4.5 AEROSOL RESPIRATORY (INHALATION) at 22:00

## 2017-09-12 RX ADMIN — Medication 1 APPLICATION(S): at 17:24

## 2017-09-12 RX ADMIN — Medication 1 SPRAY(S): at 15:54

## 2017-09-12 RX ADMIN — Medication 650 MILLIGRAM(S): at 18:01

## 2017-09-12 RX ADMIN — BUDESONIDE AND FORMOTEROL FUMARATE DIHYDRATE 2 PUFF(S): 160; 4.5 AEROSOL RESPIRATORY (INHALATION) at 09:37

## 2017-09-12 RX ADMIN — ENOXAPARIN SODIUM 40 MILLIGRAM(S): 100 INJECTION SUBCUTANEOUS at 14:18

## 2017-09-12 RX ADMIN — Medication 1 DROP(S): at 17:24

## 2017-09-12 RX ADMIN — PANTOPRAZOLE SODIUM 40 MILLIGRAM(S): 20 TABLET, DELAYED RELEASE ORAL at 12:26

## 2017-09-12 NOTE — PROGRESS NOTE ADULT - SUBJECTIVE AND OBJECTIVE BOX
CHIEF COMPLAINT:Patient is a 67y old  Male who presents with a chief complaint of "I have stomach cancer, having surgery" (22 Aug 2017 10:38)    no events      PAST MEDICAL & SURGICAL HISTORY:  Memory change  Afib  BPH (benign prostatic hyperplasia)  Hepatitis B  COPD (chronic obstructive pulmonary disease)  Diabetes  Gastric cancer: chemo, has infuspaort  Dyslipidemia  History of hypertension  History of embolic filter insertion: June 2017  History of tonsillectomy          REVIEW OF SYSTEMS:  CONSTITUTIONAL: No fever, weight loss, or fatigue  EYES: No eye pain, visual disturbances, or discharge  NECK: No pain or stiffness  RESPIRATORY: No cough, wheezing, chills or hemoptysis; No Shortness of Breath  CARDIOVASCULAR: No chest pain, palpitations, passing out, dizziness, or leg swelling  GASTROINTESTINAL: No abdominal or epigastric pain. No nausea, vomiting, or hematemesis; No diarrhea or constipation. No melena or hematochezia.  GENITOURINARY: No dysuria, frequency, hematuria, or incontinence  NEUROLOGICAL: No headaches, memory loss, loss of strength, numbness, or tremors  SKIN: No itching, burning, rashes, or lesions   LYMPH Nodes: No enlarged glands  ENDOCRINE: No heat or cold intolerance; No hair loss  MUSCULOSKELETAL: No joint pain or swelling; No muscle, back, or extremity pain      Medications:  MEDICATIONS  (STANDING):  enoxaparin Injectable 40 milliGRAM(s) SubCutaneous every 24 hours  buDESOnide 160 MICROgram(s)/formoterol 4.5 MICROgram(s) Inhaler 2 Puff(s) Inhalation two times a day  artificial  tears Solution 1 Drop(s) Both EYES two times a day  pantoprazole  Injectable 40 milliGRAM(s) IV Push daily  tiotropium 18 MICROgram(s) Capsule 1 Capsule(s) Inhalation <User Schedule>  lidocaine 2% Jelly 10 milliLiter(s) IntraUrethral two times a day  dextrose 5% + sodium chloride 0.45% with potassium chloride 20 mEq/L 1000 milliLiter(s) (100 mL/Hr) IV Continuous <Continuous>  sotalol 120 milliGRAM(s) Oral every 12 hours  diltiazem    Tablet 60 milliGRAM(s) Oral every 6 hours  insulin lispro (HumaLOG) corrective regimen sliding scale   SubCutaneous Before meals and at bedtime  BACItracin   Ointment 1 Application(s) Topical two times a day    MEDICATIONS  (PRN):  tetracaine/benzocaine/butamben Spray 1 Spray(s) Topical every 6 hours PRN pain  LORazepam   Injectable 0.5 milliGRAM(s) IV Push every 6 hours PRN Agitation  ondansetron Injectable 4 milliGRAM(s) IV Push every 6 hours PRN Nausea  diphenhydrAMINE   Injectable 25 milliGRAM(s) IV Push every 4 hours PRN Pruritus  levalbuterol Inhalation 1.25 milliGRAM(s) Inhalation every 6 hours PRN wheezing  benzocaine 15 mG/menthol 3.6 mG Lozenge 1 Lozenge Oral every 3 hours PRN Sore Throat  HYDROmorphone  Injectable 0.5 milliGRAM(s) IV Push every 6 hours PRN Moderate Pain (4 - 6)  HYDROmorphone  Injectable 1 milliGRAM(s) IV Push every 6 hours PRN Severe Pain (7 - 10)  acetaminophen   Tablet. 650 milliGRAM(s) Oral every 6 hours PRN Moderate Pain (4 - 6)    	    PHYSICAL EXAM:  T(C): 36.4 (09-12-17 @ 12:02), Max: 36.9 (09-12-17 @ 05:36)  HR: 107 (09-12-17 @ 12:02) (69 - 134)  BP: 146/91 (09-12-17 @ 12:02) (124/68 - 164/105)  RR: 20 (09-12-17 @ 12:02) (17 - 20)  SpO2: 100% (09-12-17 @ 12:02) (98% - 100%)  Wt(kg): --  I&O's Summary    11 Sep 2017 07:01  -  12 Sep 2017 07:00  --------------------------------------------------------  IN: 3327 mL / OUT: 1535 mL / NET: 1792 mL        Appearance: Normal	  HEENT:   NCAT, PERRL, EOMI	  Lymphatic: No lymphadenopathy  Cardiovascular: Normal S1 S2, RRR  Respiratory: Lungs clear to auscultation BL  Psychiatry: A & O x 3, Mood & affect appropriate  Gastrointestinal:  Soft, Non-tender, + BS  Skin: No rashes, No ecchymoses, No cyanosis	  Neurologic: Non-focal  Extremities: Normal range of motion, No clubbing, cyanosis or edema    	  LABS:	 	    CARDIAC MARKERS:                                11.5   6.77  )-----------( 211      ( 12 Sep 2017 05:17 )             35.7     09-12    144  |  107  |  9   ----------------------------<  143<H>  4.1   |  22  |  0.82    Ca    9.1      12 Sep 2017 05:17  Phos  3.4     09-12  Mg     1.9     09-12      proBNP:   Lipid Profile:   HgA1c:   TSH:

## 2017-09-12 NOTE — PROGRESS NOTE ADULT - ASSESSMENT
66 yo Male  w/hypertension, diabetes, A-fib, COPD, Hep B, dyslipidemia, and BPH, gastric CA s/p gastrectomy:  1. Gastric CA s/p gastrectomy + jejunostomy - management per Surgery, IVF / diet per sx , pain control, bowel regimen, incentive spirometry, DVT prophylaxis, PT/OOB, PPI  2. DM2 - off PO meds, c/w ISS, Fs controlled  3. A fib - rate controlled, c/w cardizem and sotalol  /restart ASA when cleared by Surgery/cards eval noted  4. COPD  -no wheezing, c/w Symbicart + Spiriva  5. HLD - restart statin when eating

## 2017-09-12 NOTE — PROGRESS NOTE ADULT - SUBJECTIVE AND OBJECTIVE BOX
D-Team (Surgical Oncology) Daily Progress Note    SUBJECTIVE:  Pt seen and examined, and is resting comfortably in bed. No acute events overnight. Pain is adequately controlled on current regimen. Pt has no complaints at this time. Negative for flatus and BM.     OBJECTIVE:  Vital Signs Last 24 Hrs  T(C): 36.4 (12 Sep 2017 12:02), Max: 36.9 (12 Sep 2017 05:36)  T(F): 97.5 (12 Sep 2017 12:02), Max: 98.5 (12 Sep 2017 05:36)  HR: 107 (12 Sep 2017 12:02) (69 - 131)  BP: 146/91 (12 Sep 2017 12:02) (124/68 - 157/100)  BP(mean): --  RR: 20 (12 Sep 2017 12:02) (17 - 20)  SpO2: 100% (12 Sep 2017 12:02) (98% - 100%)    I&O's Detail    11 Sep 2017 07:01  -  12 Sep 2017 07:00  --------------------------------------------------------  IN:    dextrose 5% + sodium chloride 0.45% with potassium chloride 20 mEq/L: 2100 mL    diltiazem Infusion: 50 mL    Glucerna: 840 mL    IV PiggyBack: 50 mL    Oral Fluid: 287 mL  Total IN: 3327 mL    OUT:    Bulb: 85 mL    Voided: 1450 mL  Total OUT: 1535 mL    Total NET: 1792 mL      Exam:  GEN: A&Ox3, NAD  HEENT: atraumatic, normocephalic  CV: no JVD  RESP: no increased work of breathing, no use of accessory muscles  GI/ABD:  soft, appropriately tender, mildly distended, midline incision with two staples around umbilicus draining serosanguinous fluid, incision otherwise c/d/i, some mild skin irritation from tape,   : deferred  EXTREMITIES: warm, pink, well-perfused                          11.5   6.77  )-----------( 211      ( 12 Sep 2017 05:17 )             35.7       09-12    144  |  107  |  9   ----------------------------<  143<H>  4.1   |  22  |  0.82    Ca    9.1      12 Sep 2017 05:17  Phos  3.4     09-12  Mg     1.9     09-12            ASSESSMENT:  67y Male POD6 s/p partial gastrectomy and j tube placement    PLAN:    - Bariatric LRD diet  - Wean down TF  - Continue current cardiac regimen  - Bacitracin on abdominal skin tears   - DVT ppx  - Pain control  - No manipulation of NGT tube      Nathalie Lee MD PGY-1  pager: [phone]

## 2017-09-12 NOTE — PROGRESS NOTE ADULT - ASSESSMENT
afib  HR stable  on sotalol and cardizem PO 60 q6  resume asa once deemed safe from surgery    HTN  stable  plan as above    Dm  Monitor finger stick. Insulin coverage. Diabetic education and Diabetic diet. Consider nutrition consultation.

## 2017-09-12 NOTE — PROGRESS NOTE ADULT - SUBJECTIVE AND OBJECTIVE BOX
Subjective: Patient seen and examined. No new events except as noted.     SUBJECTIVE/ROS:  No chest pain, or sob.       MEDICATIONS:  MEDICATIONS  (STANDING):  enoxaparin Injectable 40 milliGRAM(s) SubCutaneous every 24 hours  buDESOnide 160 MICROgram(s)/formoterol 4.5 MICROgram(s) Inhaler 2 Puff(s) Inhalation two times a day  artificial  tears Solution 1 Drop(s) Both EYES two times a day  pantoprazole  Injectable 40 milliGRAM(s) IV Push daily  tiotropium 18 MICROgram(s) Capsule 1 Capsule(s) Inhalation <User Schedule>  lidocaine 2% Jelly 10 milliLiter(s) IntraUrethral two times a day  dextrose 5% + sodium chloride 0.45% with potassium chloride 20 mEq/L 1000 milliLiter(s) (100 mL/Hr) IV Continuous <Continuous>  sotalol 120 milliGRAM(s) Oral every 12 hours  diltiazem    Tablet 60 milliGRAM(s) Oral every 6 hours  insulin lispro (HumaLOG) corrective regimen sliding scale   SubCutaneous Before meals and at bedtime  BACItracin   Ointment 1 Application(s) Topical two times a day      PHYSICAL EXAM:  T(C): 36.4 (09-12-17 @ 12:02), Max: 36.9 (09-12-17 @ 05:36)  HR: 107 (09-12-17 @ 12:02) (69 - 131)  BP: 146/91 (09-12-17 @ 12:02) (124/68 - 157/100)  RR: 20 (09-12-17 @ 12:02) (17 - 20)  SpO2: 100% (09-12-17 @ 12:02) (98% - 100%)  Wt(kg): --  I&O's Summary    11 Sep 2017 07:01  -  12 Sep 2017 07:00  --------------------------------------------------------  IN: 3327 mL / OUT: 1535 mL / NET: 1792 mL          Appearance: Normal	  HEENT:   Normal oral mucosa, PERRL, EOMI	  Cardiovascular: Normal S1 S2,    Murmur:   Neck: JVP normal  Respiratory: Lungs clear to auscultation  Gastrointestinal:  Soft, Non-tender, + BS	  Skin: normal   Neuro: No gross deficits.   Psychiatry:  Mood & affect appropriate  Ext: No edema        LABS:    CARDIAC MARKERS:                                11.5   6.77  )-----------( 211      ( 12 Sep 2017 05:17 )             35.7     09-12    144  |  107  |  9   ----------------------------<  143<H>  4.1   |  22  |  0.82    Ca    9.1      12 Sep 2017 05:17  Phos  3.4     09-12  Mg     1.9     09-12      proBNP:   Lipid Profile:   HgA1c:   TSH:           TELEMETRY: 	    ECG:  	  RADIOLOGY:   DIAGNOSTIC TESTING:  Echocardiogram:  Catheterization:  Stress Test:    OTHER:

## 2017-09-13 LAB
BUN SERPL-MCNC: 12 MG/DL — SIGNIFICANT CHANGE UP (ref 7–23)
CALCIUM SERPL-MCNC: 9.3 MG/DL — SIGNIFICANT CHANGE UP (ref 8.4–10.5)
CHLORIDE SERPL-SCNC: 105 MMOL/L — SIGNIFICANT CHANGE UP (ref 98–107)
CO2 SERPL-SCNC: 25 MMOL/L — SIGNIFICANT CHANGE UP (ref 22–31)
CREAT SERPL-MCNC: 0.86 MG/DL — SIGNIFICANT CHANGE UP (ref 0.5–1.3)
GLUCOSE SERPL-MCNC: 98 MG/DL — SIGNIFICANT CHANGE UP (ref 70–99)
HCT VFR BLD CALC: 35.8 % — LOW (ref 39–50)
HGB BLD-MCNC: 11.4 G/DL — LOW (ref 13–17)
MAGNESIUM SERPL-MCNC: 1.9 MG/DL — SIGNIFICANT CHANGE UP (ref 1.6–2.6)
MCHC RBC-ENTMCNC: 27.6 PG — SIGNIFICANT CHANGE UP (ref 27–34)
MCHC RBC-ENTMCNC: 31.8 % — LOW (ref 32–36)
MCV RBC AUTO: 86.7 FL — SIGNIFICANT CHANGE UP (ref 80–100)
NRBC # FLD: 0 — SIGNIFICANT CHANGE UP
PHOSPHATE SERPL-MCNC: 4 MG/DL — SIGNIFICANT CHANGE UP (ref 2.5–4.5)
PLATELET # BLD AUTO: 248 K/UL — SIGNIFICANT CHANGE UP (ref 150–400)
PMV BLD: 10.8 FL — SIGNIFICANT CHANGE UP (ref 7–13)
POTASSIUM SERPL-MCNC: 4 MMOL/L — SIGNIFICANT CHANGE UP (ref 3.5–5.3)
POTASSIUM SERPL-SCNC: 4 MMOL/L — SIGNIFICANT CHANGE UP (ref 3.5–5.3)
RBC # BLD: 4.13 M/UL — LOW (ref 4.2–5.8)
RBC # FLD: 16.2 % — HIGH (ref 10.3–14.5)
S PYO SPEC QL CULT: SIGNIFICANT CHANGE UP
SODIUM SERPL-SCNC: 142 MMOL/L — SIGNIFICANT CHANGE UP (ref 135–145)
SPECIMEN SOURCE: SIGNIFICANT CHANGE UP
WBC # BLD: 6.43 K/UL — SIGNIFICANT CHANGE UP (ref 3.8–10.5)
WBC # FLD AUTO: 6.43 K/UL — SIGNIFICANT CHANGE UP (ref 3.8–10.5)

## 2017-09-13 RX ORDER — MAGNESIUM SULFATE 500 MG/ML
1 VIAL (ML) INJECTION ONCE
Qty: 0 | Refills: 0 | Status: DISCONTINUED | OUTPATIENT
Start: 2017-09-13 | End: 2017-09-13

## 2017-09-13 RX ADMIN — PANTOPRAZOLE SODIUM 40 MILLIGRAM(S): 20 TABLET, DELAYED RELEASE ORAL at 05:16

## 2017-09-13 RX ADMIN — Medication 1 APPLICATION(S): at 17:27

## 2017-09-13 RX ADMIN — BUDESONIDE AND FORMOTEROL FUMARATE DIHYDRATE 2 PUFF(S): 160; 4.5 AEROSOL RESPIRATORY (INHALATION) at 09:00

## 2017-09-13 RX ADMIN — Medication 1 APPLICATION(S): at 05:15

## 2017-09-13 RX ADMIN — Medication 81 MILLIGRAM(S): at 12:55

## 2017-09-13 RX ADMIN — Medication 120 MILLIGRAM(S): at 05:15

## 2017-09-13 RX ADMIN — ENOXAPARIN SODIUM 40 MILLIGRAM(S): 100 INJECTION SUBCUTANEOUS at 12:55

## 2017-09-13 RX ADMIN — Medication 120 MILLIGRAM(S): at 18:21

## 2017-09-13 NOTE — PROGRESS NOTE ADULT - SUBJECTIVE AND OBJECTIVE BOX
CHIEF COMPLAINT:Patient is a 67y old  Male who presents with a chief complaint of "I have stomach cancer, having surgery" (22 Aug 2017 10:38)    advanced to regular diet    PAST MEDICAL & SURGICAL HISTORY:  Memory change  Afib  BPH (benign prostatic hyperplasia)  Hepatitis B  COPD (chronic obstructive pulmonary disease)  Diabetes  Gastric cancer: chemo, has infuspaort  Dyslipidemia  History of hypertension  History of embolic filter insertion: June 2017  History of tonsillectomy          REVIEW OF SYSTEMS:  CONSTITUTIONAL: No fever, weight loss, or fatigue  EYES: No eye pain, visual disturbances, or discharge  NECK: No pain or stiffness  RESPIRATORY: No cough, wheezing, chills or hemoptysis; No Shortness of Breath  CARDIOVASCULAR: No chest pain, palpitations, passing out, dizziness, or leg swelling  GASTROINTESTINAL: No abdominal or epigastric pain. No nausea, vomiting, or hematemesis; No diarrhea or constipation. No melena or hematochezia.  GENITOURINARY: No dysuria, frequency, hematuria, or incontinence  NEUROLOGICAL: No headaches, memory loss, loss of strength, numbness, or tremors  SKIN: No itching, burning, rashes, or lesions   LYMPH Nodes: No enlarged glands  ENDOCRINE: No heat or cold intolerance; No hair loss  MUSCULOSKELETAL: No joint pain or swelling; No muscle, back, or extremity pain      Medications:  MEDICATIONS  (STANDING):  enoxaparin Injectable 40 milliGRAM(s) SubCutaneous every 24 hours  buDESOnide 160 MICROgram(s)/formoterol 4.5 MICROgram(s) Inhaler 2 Puff(s) Inhalation two times a day  artificial  tears Solution 1 Drop(s) Both EYES two times a day  tiotropium 18 MICROgram(s) Capsule 1 Capsule(s) Inhalation <User Schedule>  sotalol 120 milliGRAM(s) Oral every 12 hours  diltiazem    Tablet 60 milliGRAM(s) Oral every 6 hours  insulin lispro (HumaLOG) corrective regimen sliding scale   SubCutaneous Before meals and at bedtime  BACItracin   Ointment 1 Application(s) Topical two times a day  pantoprazole    Tablet 40 milliGRAM(s) Oral before breakfast  atorvastatin 20 milliGRAM(s) Oral at bedtime  aspirin  chewable 81 milliGRAM(s) Oral daily    MEDICATIONS  (PRN):  tetracaine/benzocaine/butamben Spray 1 Spray(s) Topical every 6 hours PRN pain  levalbuterol Inhalation 1.25 milliGRAM(s) Inhalation every 6 hours PRN wheezing  benzocaine 15 mG/menthol 3.6 mG Lozenge 1 Lozenge Oral every 3 hours PRN Sore Throat  acetaminophen   Tablet. 650 milliGRAM(s) Oral every 6 hours PRN Moderate Pain (4 - 6)    	    PHYSICAL EXAM:  T(C): 37 (09-13-17 @ 05:07), Max: 37 (09-13-17 @ 00:41)  HR: 77 (09-13-17 @ 08:37) (77 - 115)  BP: 127/95 (09-13-17 @ 08:37) (127/95 - 153/82)  RR: 20 (09-13-17 @ 08:37) (18 - 20)  SpO2: 100% (09-13-17 @ 08:37) (98% - 100%)  Wt(kg): --  I&O's Summary    12 Sep 2017 07:01  -  13 Sep 2017 07:00  --------------------------------------------------------  IN: 0 mL / OUT: 915 mL / NET: -915 mL        Appearance: Normal	  HEENT:   NCAT, PERRL, EOMI	  Lymphatic: No lymphadenopathy  Cardiovascular: Normal S1 S2, RRR  Respiratory: Lungs clear to auscultation BL  Psychiatry: A & O x 3, Mood & affect appropriate  Gastrointestinal:  Soft, Non-tender, + BS  Skin: No rashes, No ecchymoses, No cyanosis	  Neurologic: Non-focal  Extremities: Normal range of motion, No clubbing, cyanosis or edema  	  LABS:	 	    CARDIAC MARKERS:                                11.4   6.43  )-----------( 248      ( 13 Sep 2017 05:04 )             35.8     09-13    142  |  105  |  12  ----------------------------<  98  4.0   |  25  |  0.86    Ca    9.3      13 Sep 2017 05:08  Phos  4.0     09-13  Mg     1.9     09-13      proBNP:   Lipid Profile:   HgA1c:   TSH:

## 2017-09-13 NOTE — PROGRESS NOTE ADULT - SUBJECTIVE AND OBJECTIVE BOX
D-Team (Surgical Oncology) Daily Progress Note    SUBJECTIVE:  Pt seen and examined, and is resting comfortably in bed. No acute events overnight. Pain is adequately controlled on current regimen. Pt has no complaints at this time. Positive for flatus and BM.     OBJECTIVE:  Vital Signs Last 24 Hrs  T(C): 37 (13 Sep 2017 05:07), Max: 37 (13 Sep 2017 00:41)  T(F): 98.6 (13 Sep 2017 05:07), Max: 98.6 (13 Sep 2017 00:41)  HR: 115 (13 Sep 2017 05:07) (69 - 115)  BP: 152/87 (13 Sep 2017 05:07) (124/68 - 153/82)  BP(mean): --  RR: 18 (13 Sep 2017 05:07) (17 - 20)  SpO2: 98% (13 Sep 2017 05:07) (98% - 100%)    I&O's Detail    12 Sep 2017 07:01  -  13 Sep 2017 07:00  --------------------------------------------------------  IN:  Total IN: 0 mL    OUT:    Bulb: 40 mL    Voided: 875 mL  Total OUT: 915 mL    Total NET: -915 mL    Exam:  GEN: A&Ox3, NAD  HEENT: atraumatic, normocephalic  CV: no JVD  RESP: no increased work of breathing, no use of accessory muscles  GI/ABD: soft, appropriately tender, mildly distended, midline incision with two staples around umbilicus draining serosanguinous fluid, incision otherwise c/d/i, some mild skin irritation from tape,     : deferred  EXTREMITIES: warm, pink, well-perfused                          11.4   6.43  )-----------( 248      ( 13 Sep 2017 05:04 )             35.8       09-13    142  |  105  |  12  ----------------------------<  98  4.0   |  25  |  0.86    Ca    9.3      13 Sep 2017 05:08  Phos  4.0     09-13  Mg     1.9     09-13            ASSESSMENT:  67y Male POD#7 s/p partial gastrectomy and j tube placement    PLAN:    - Bariatric LRD diet  - D/c tube feeds  - Continue current cardiac regimen  - Bacitracin on abdominal skin tears   - DVT ppx  - Pain control      Nathalie Lee MD PGY-1  pager: [phone]

## 2017-09-13 NOTE — PROGRESS NOTE ADULT - ASSESSMENT
68 yo Male  w/hypertension, diabetes, A-fib, COPD, Hep B, dyslipidemia, and BPH, gastric CA s/p gastrectomy:  1. Gastric CA s/p gastrectomy + jejunostomy - management per Surgery, diet per sx , pain control, bowel regimen, incentive spirometry, DVT prophylaxis, PT/OOB, PPI  2. DM2 - off PO meds, c/w ISS, Fs controlled  3. A fib - rate controlled, c/w cardizem and sotalol, ASA restarted  4. COPD  -no wheezing, c/w Symbicart + Spiriva  5. HLD - c/w statin

## 2017-09-13 NOTE — PROGRESS NOTE ADULT - SUBJECTIVE AND OBJECTIVE BOX
Subjective: Patient seen and examined. No new events except as noted.     SUBJECTIVE/ROS:    No chest pain, or sob.     MEDICATIONS:  MEDICATIONS  (STANDING):  enoxaparin Injectable 40 milliGRAM(s) SubCutaneous every 24 hours  buDESOnide 160 MICROgram(s)/formoterol 4.5 MICROgram(s) Inhaler 2 Puff(s) Inhalation two times a day  artificial  tears Solution 1 Drop(s) Both EYES two times a day  tiotropium 18 MICROgram(s) Capsule 1 Capsule(s) Inhalation <User Schedule>  sotalol 120 milliGRAM(s) Oral every 12 hours  diltiazem    Tablet 60 milliGRAM(s) Oral every 6 hours  insulin lispro (HumaLOG) corrective regimen sliding scale   SubCutaneous Before meals and at bedtime  BACItracin   Ointment 1 Application(s) Topical two times a day  pantoprazole    Tablet 40 milliGRAM(s) Oral before breakfast  atorvastatin 20 milliGRAM(s) Oral at bedtime  aspirin  chewable 81 milliGRAM(s) Oral daily      PHYSICAL EXAM:  T(C): 36.8 (09-13-17 @ 16:26), Max: 37 (09-13-17 @ 00:41)  HR: 109 (09-13-17 @ 16:26) (77 - 115)  BP: 140/99 (09-13-17 @ 16:26) (127/95 - 152/87)  RR: 20 (09-13-17 @ 16:26) (18 - 20)  SpO2: 99% (09-13-17 @ 16:26) (98% - 100%)  Wt(kg): --  I&O's Summary    12 Sep 2017 07:01  -  13 Sep 2017 07:00  --------------------------------------------------------  IN: 0 mL / OUT: 915 mL / NET: -915 mL    13 Sep 2017 07:01  -  13 Sep 2017 16:41  --------------------------------------------------------  IN: 0 mL / OUT: 530 mL / NET: -530 mL          Appearance: Normal	  HEENT:   Normal oral mucosa, PERRL, EOMI	  Cardiovascular: Normal S1 S2,    Murmur:   Neck: JVP normal  Respiratory: Lungs clear to auscultation  Gastrointestinal:  Soft, Non-tender, + BS	  Skin: normal   Neuro: No gross deficits.   Psychiatry:  Mood & affect appropriate  Ext: No edema        LABS:    CARDIAC MARKERS:                                11.4   6.43  )-----------( 248      ( 13 Sep 2017 05:04 )             35.8     09-13    142  |  105  |  12  ----------------------------<  98  4.0   |  25  |  0.86    Ca    9.3      13 Sep 2017 05:08  Phos  4.0     09-13  Mg     1.9     09-13      proBNP:   Lipid Profile:   HgA1c:   TSH:           TELEMETRY: 	    ECG:  	  RADIOLOGY:   DIAGNOSTIC TESTING:  Echocardiogram:  Catheterization:  Stress Test:    OTHER:

## 2017-09-14 ENCOUNTER — TRANSCRIPTION ENCOUNTER (OUTPATIENT)
Age: 67
End: 2017-09-14

## 2017-09-14 VITALS
HEART RATE: 108 BPM | TEMPERATURE: 98 F | RESPIRATION RATE: 16 BRPM | OXYGEN SATURATION: 98 % | DIASTOLIC BLOOD PRESSURE: 71 MMHG | SYSTOLIC BLOOD PRESSURE: 122 MMHG

## 2017-09-14 RX ORDER — DILTIAZEM HCL 120 MG
1 CAPSULE, EXT RELEASE 24 HR ORAL
Qty: 0 | Refills: 0 | COMMUNITY
Start: 2017-09-14

## 2017-09-14 RX ORDER — ACETAMINOPHEN 500 MG
2 TABLET ORAL
Qty: 0 | Refills: 0 | COMMUNITY
Start: 2017-09-14

## 2017-09-14 RX ORDER — ENOXAPARIN SODIUM 100 MG/ML
40 INJECTION SUBCUTANEOUS
Qty: 28 | Refills: 0 | OUTPATIENT
Start: 2017-09-14 | End: 2017-10-12

## 2017-09-14 RX ORDER — SOTALOL HCL 120 MG
1 TABLET ORAL
Qty: 0 | Refills: 0 | COMMUNITY
Start: 2017-09-14

## 2017-09-14 RX ORDER — SOTALOL HCL 120 MG
1 TABLET ORAL
Qty: 60 | Refills: 0 | OUTPATIENT
Start: 2017-09-14 | End: 2017-10-14

## 2017-09-14 RX ORDER — DILTIAZEM HCL 120 MG
1 CAPSULE, EXT RELEASE 24 HR ORAL
Qty: 0 | Refills: 0 | COMMUNITY

## 2017-09-14 RX ORDER — BACITRACIN ZINC 500 UNIT/G
1 OINTMENT IN PACKET (EA) TOPICAL
Qty: 0 | Refills: 0 | COMMUNITY
Start: 2017-09-14

## 2017-09-14 RX ORDER — DILTIAZEM HCL 120 MG
1 CAPSULE, EXT RELEASE 24 HR ORAL
Qty: 120 | Refills: 0 | OUTPATIENT
Start: 2017-09-14 | End: 2017-10-14

## 2017-09-14 RX ADMIN — Medication 120 MILLIGRAM(S): at 05:54

## 2017-09-14 RX ADMIN — TIOTROPIUM BROMIDE 1 CAPSULE(S): 18 CAPSULE ORAL; RESPIRATORY (INHALATION) at 12:32

## 2017-09-14 RX ADMIN — ENOXAPARIN SODIUM 40 MILLIGRAM(S): 100 INJECTION SUBCUTANEOUS at 13:23

## 2017-09-14 RX ADMIN — Medication 1 APPLICATION(S): at 05:54

## 2017-09-14 RX ADMIN — BUDESONIDE AND FORMOTEROL FUMARATE DIHYDRATE 2 PUFF(S): 160; 4.5 AEROSOL RESPIRATORY (INHALATION) at 09:50

## 2017-09-14 RX ADMIN — Medication 1 SPRAY(S): at 12:33

## 2017-09-14 RX ADMIN — PANTOPRAZOLE SODIUM 40 MILLIGRAM(S): 20 TABLET, DELAYED RELEASE ORAL at 05:54

## 2017-09-14 RX ADMIN — Medication 81 MILLIGRAM(S): at 12:32

## 2017-09-14 NOTE — PROGRESS NOTE ADULT - SUBJECTIVE AND OBJECTIVE BOX
CHIEF COMPLAINT:Patient is a 67y old  Male who presents with a chief complaint of "I have stomach cancer, having surgery" (22 Aug 2017 10:38)    advanced to regular diet    PAST MEDICAL & SURGICAL HISTORY:  Memory change  Afib  BPH (benign prostatic hyperplasia)  Hepatitis B  COPD (chronic obstructive pulmonary disease)  Diabetes  Gastric cancer: chemo, has infuspaort  Dyslipidemia  History of hypertension  History of embolic filter insertion: June 2017  History of tonsillectomy          REVIEW OF SYSTEMS:  CONSTITUTIONAL: No fever, weight loss, or fatigue  EYES: No eye pain, visual disturbances, or discharge  NECK: No pain or stiffness  RESPIRATORY: No cough, wheezing, chills or hemoptysis; No Shortness of Breath  CARDIOVASCULAR: No chest pain, palpitations, passing out, dizziness, or leg swelling  GASTROINTESTINAL: No abdominal or epigastric pain. No nausea, vomiting, or hematemesis; No diarrhea or constipation. No melena or hematochezia.  GENITOURINARY: No dysuria, frequency, hematuria, or incontinence  NEUROLOGICAL: No headaches, memory loss, loss of strength, numbness, or tremors  SKIN: No itching, burning, rashes, or lesions   LYMPH Nodes: No enlarged glands  ENDOCRINE: No heat or cold intolerance; No hair loss  MUSCULOSKELETAL: No joint pain or swelling; No muscle, back, or extremity pain    Medications:  MEDICATIONS  (STANDING):  enoxaparin Injectable 40 milliGRAM(s) SubCutaneous every 24 hours  buDESOnide 160 MICROgram(s)/formoterol 4.5 MICROgram(s) Inhaler 2 Puff(s) Inhalation two times a day  artificial  tears Solution 1 Drop(s) Both EYES two times a day  tiotropium 18 MICROgram(s) Capsule 1 Capsule(s) Inhalation <User Schedule>  sotalol 120 milliGRAM(s) Oral every 12 hours  diltiazem    Tablet 60 milliGRAM(s) Oral every 6 hours  insulin lispro (HumaLOG) corrective regimen sliding scale   SubCutaneous Before meals and at bedtime  BACItracin   Ointment 1 Application(s) Topical two times a day  pantoprazole    Tablet 40 milliGRAM(s) Oral before breakfast  atorvastatin 20 milliGRAM(s) Oral at bedtime  aspirin  chewable 81 milliGRAM(s) Oral daily    MEDICATIONS  (PRN):  tetracaine/benzocaine/butamben Spray 1 Spray(s) Topical every 6 hours PRN pain  levalbuterol Inhalation 1.25 milliGRAM(s) Inhalation every 6 hours PRN wheezing  benzocaine 15 mG/menthol 3.6 mG Lozenge 1 Lozenge Oral every 3 hours PRN Sore Throat  acetaminophen   Tablet. 650 milliGRAM(s) Oral every 6 hours PRN Moderate Pain (4 - 6)    	    PHYSICAL EXAM:  T(C): 36.8 (09-14-17 @ 12:37), Max: 36.8 (09-14-17 @ 05:52)  HR: 108 (09-14-17 @ 12:37) (83 - 114)  BP: 122/71 (09-14-17 @ 12:37) (118/62 - 140/98)  RR: 16 (09-14-17 @ 12:37) (16 - 18)  SpO2: 98% (09-14-17 @ 12:37) (98% - 99%)  Wt(kg): --  I&O's Summary    13 Sep 2017 07:01  -  14 Sep 2017 07:00  --------------------------------------------------------  IN: 0 mL / OUT: 1580 mL / NET: -1580 mL    14 Sep 2017 07:01  -  14 Sep 2017 17:10  --------------------------------------------------------  IN: 0 mL / OUT: 200 mL / NET: -200 mL        Appearance: Normal	  HEENT:   NCAT, PERRL, EOMI	  Lymphatic: No lymphadenopathy  Cardiovascular: Normal S1 S2, RRR  Respiratory: Lungs clear to auscultation BL  Psychiatry: A & O x 3, Mood & affect appropriate  Gastrointestinal:  Soft, Non-tender, + BS  Skin: No rashes, No ecchymoses, No cyanosis	  Neurologic: Non-focal  Extremities: Normal range of motion, No clubbing, cyanosis or edema    	  LABS:	 	    CARDIAC MARKERS:                                11.4   6.43  )-----------( 248      ( 13 Sep 2017 05:04 )             35.8     09-13    142  |  105  |  12  ----------------------------<  98  4.0   |  25  |  0.86    Ca    9.3      13 Sep 2017 05:08  Phos  4.0     09-13  Mg     1.9     09-13      proBNP:   Lipid Profile:   HgA1c:   TSH:

## 2017-09-14 NOTE — DISCHARGE NOTE ADULT - NS AS ACTIVITY OBS
Do not drive or operate machinery/Walking-Outdoors allowed/Stairs allowed/Do not drive while taking pain medications./No Heavy lifting/straining/Walking-Indoors allowed/Do not make important decisions

## 2017-09-14 NOTE — PROGRESS NOTE ADULT - SUBJECTIVE AND OBJECTIVE BOX
Subjective: Patient seen and examined. No new events except as noted.     SUBJECTIVE/ROS:    No chest pain, or sob.      MEDICATIONS:  MEDICATIONS  (STANDING):  enoxaparin Injectable 40 milliGRAM(s) SubCutaneous every 24 hours  buDESOnide 160 MICROgram(s)/formoterol 4.5 MICROgram(s) Inhaler 2 Puff(s) Inhalation two times a day  artificial  tears Solution 1 Drop(s) Both EYES two times a day  tiotropium 18 MICROgram(s) Capsule 1 Capsule(s) Inhalation <User Schedule>  sotalol 120 milliGRAM(s) Oral every 12 hours  diltiazem    Tablet 60 milliGRAM(s) Oral every 6 hours  insulin lispro (HumaLOG) corrective regimen sliding scale   SubCutaneous Before meals and at bedtime  BACItracin   Ointment 1 Application(s) Topical two times a day  pantoprazole    Tablet 40 milliGRAM(s) Oral before breakfast  atorvastatin 20 milliGRAM(s) Oral at bedtime  aspirin  chewable 81 milliGRAM(s) Oral daily      PHYSICAL EXAM:  T(C): 36.7 (09-14-17 @ 07:54), Max: 36.8 (09-13-17 @ 12:54)  HR: 83 (09-14-17 @ 07:54) (83 - 114)  BP: 136/93 (09-14-17 @ 07:54) (118/62 - 140/99)  RR: 17 (09-14-17 @ 07:54) (17 - 20)  SpO2: 99% (09-14-17 @ 07:54) (99% - 99%)  Wt(kg): --  I&O's Summary    13 Sep 2017 07:01  -  14 Sep 2017 07:00  --------------------------------------------------------  IN: 0 mL / OUT: 1580 mL / NET: -1580 mL    14 Sep 2017 07:01  -  14 Sep 2017 10:38  --------------------------------------------------------  IN: 0 mL / OUT: 200 mL / NET: -200 mL          Appearance: Normal	  HEENT:   Normal oral mucosa, PERRL, EOMI	  Cardiovascular: Normal S1 S2,    Murmur:   Neck: JVP normal  Respiratory: Lungs clear to auscultation  Gastrointestinal:  Soft, Non-tender, + BS	  Skin: normal   Neuro: No gross deficits.   Psychiatry:  Mood & affect appropriate  Ext: No edema        LABS:    CARDIAC MARKERS:                                11.4   6.43  )-----------( 248      ( 13 Sep 2017 05:04 )             35.8     09-13    142  |  105  |  12  ----------------------------<  98  4.0   |  25  |  0.86    Ca    9.3      13 Sep 2017 05:08  Phos  4.0     09-13  Mg     1.9     09-13      proBNP:   Lipid Profile:   HgA1c:   TSH:           TELEMETRY: 	    ECG:  	  RADIOLOGY:   DIAGNOSTIC TESTING:  Echocardiogram:  Catheterization:  Stress Test:    OTHER:

## 2017-09-14 NOTE — DISCHARGE NOTE ADULT - PLAN OF CARE
status post esophagogastroduodenoscopy, partial gastrectomy, feeding jejunostomy, umbilical hernia repair, liver biopsy Please call  to schedule a follow up appointment with Dr Martinez Continue current treatment Your cardizem and sotalol medication doses were changed. Please follow up with Dr. Hinds, Call for an appointment [phone]

## 2017-09-14 NOTE — DISCHARGE NOTE ADULT - HOME CARE AGENCY
[facility] [phone] the nurse will visit a day after discharge. The nurse will call prior to visit.

## 2017-09-14 NOTE — DISCHARGE NOTE ADULT - CONDITIONS AT DISCHARGE
Pt. remained stable and vital signs were within normal limits. Pt. did not complain of shortness of breath or chest pain. pt. has skin tears on abd. pt. has Jtube that is clamped. pt. daughter provided with lovenox .teaching. D/C IV as per orders and Pt. is discharged to home as per orders.

## 2017-09-14 NOTE — PROGRESS NOTE ADULT - ASSESSMENT
66 yo Male  w/hypertension, diabetes, A-fib, COPD, Hep B, dyslipidemia, and BPH, gastric CA s/p gastrectomy:  1. Gastric CA s/p gastrectomy + jejunostomy - management per Surgery, diet per sx , pain control, bowel regimen, incentive spirometry, DVT prophylaxis, PT/OOB, PPI, no medical objection to discharge  2. DM2 - off PO meds, c/w ISS, Fs controlled  3. A fib - rate controlled, c/w cardizem and sotalol, ASA restarted  4. COPD  -no wheezing, c/w Symbicart + Spiriva  5. HLD - c/w statin

## 2017-09-14 NOTE — PROGRESS NOTE ADULT - ASSESSMENT
afib  HR stable  on sotalol and cardizem PO 60 q6  cont asa    HTN  stable  plan as above    Dm  Monitor finger stick. Insulin coverage. Diabetic education and Diabetic diet. Consider nutrition consultation.

## 2017-09-14 NOTE — DISCHARGE NOTE ADULT - CONDITION (STATED IN TERMS THAT PERMIT A SPECIFIC MEASURABLE COMPARISON WITH CONDITION ON ADMISSION):
Patient is stable: tolerating diet, voiding, ambulating, pain well controlled.  Patient is stable: tolerating diet, voiding, ambulating, pain well controlled.

## 2017-09-14 NOTE — DISCHARGE NOTE ADULT - CARE PLAN
Principal Discharge DX:	Gastric cancer  Goal:	status post esophagogastroduodenoscopy, partial gastrectomy, feeding jejunostomy, umbilical hernia repair, liver biopsy  Instructions for follow-up, activity and diet:	Please call  to schedule a follow up appointment with Dr Martinez  Secondary Diagnosis:	Afib  Goal:	Continue current treatment  Secondary Diagnosis:	COPD (chronic obstructive pulmonary disease)  Goal:	Continue current treatment  Secondary Diagnosis:	Diabetes  Goal:	Continue current treatment  Secondary Diagnosis:	Dyslipidemia  Goal:	Continue current treatment Principal Discharge DX:	Gastric cancer  Goal:	status post esophagogastroduodenoscopy, partial gastrectomy, feeding jejunostomy, umbilical hernia repair, liver biopsy  Instructions for follow-up, activity and diet:	Please call  to schedule a follow up appointment with Dr Martinez  Secondary Diagnosis:	Afib  Goal:	Continue current treatment  Instructions for follow-up, activity and diet:	Your cardizem and sotalol medication doses were changed. Please follow up with Dr. Hinds, Call for an appointment [phone]  Secondary Diagnosis:	COPD (chronic obstructive pulmonary disease)  Goal:	Continue current treatment  Secondary Diagnosis:	Diabetes  Goal:	Continue current treatment  Secondary Diagnosis:	Dyslipidemia  Goal:	Continue current treatment

## 2017-09-14 NOTE — PROGRESS NOTE ADULT - SUBJECTIVE AND OBJECTIVE BOX
DX:  proximal gastrojejunostomy, j tube  POD#:9          SUBJECTIVE    NAEO. Pt feels well overall. Pain well controlled. Endorsing flatus, BM. Tolerating reg diet. Denies N/V    10-point review of systems completed and negative except as noted above.    enoxaparin Injectable 40 milliGRAM(s) SubCutaneous every 24 hours  buDESOnide 160 MICROgram(s)/formoterol 4.5 MICROgram(s) Inhaler 2 Puff(s) Inhalation two times a day  artificial  tears Solution 1 Drop(s) Both EYES two times a day  tiotropium 18 MICROgram(s) Capsule 1 Capsule(s) Inhalation <User Schedule>  tetracaine/benzocaine/butamben Spray 1 Spray(s) Topical every 6 hours PRN  levalbuterol Inhalation 1.25 milliGRAM(s) Inhalation every 6 hours PRN  benzocaine 15 mG/menthol 3.6 mG Lozenge 1 Lozenge Oral every 3 hours PRN  sotalol 120 milliGRAM(s) Oral every 12 hours  diltiazem    Tablet 60 milliGRAM(s) Oral every 6 hours  acetaminophen   Tablet. 650 milliGRAM(s) Oral every 6 hours PRN  insulin lispro (HumaLOG) corrective regimen sliding scale   SubCutaneous Before meals and at bedtime  BACItracin   Ointment 1 Application(s) Topical two times a day  pantoprazole    Tablet 40 milliGRAM(s) Oral before breakfast  atorvastatin 20 milliGRAM(s) Oral at bedtime  aspirin  chewable 81 milliGRAM(s) Oral daily            OBJECTIVE    T(C): 36.8 (09-14-17 @ 12:37), Max: 36.8 (09-13-17 @ 16:26)  HR: 108 (09-14-17 @ 12:37) (83 - 114)  BP: 122/71 (09-14-17 @ 12:37) (118/62 - 140/99)  RR: 16 (09-14-17 @ 12:37) (16 - 20)  SpO2: 98% (09-14-17 @ 12:37) (98% - 99%)    09-13-17 @ 07:01  -  09-14-17 @ 07:00  --------------------------------------------------------  IN: 0 mL / OUT: 1580 mL / NET: -1580 mL    09-14-17 @ 07:01  -  09-14-17 @ 13:10  --------------------------------------------------------  IN: 0 mL / OUT: 200 mL / NET: -200 mL        EXAM  GEN: A&Ox3, NAD  HEENT: atraumatic, normocephalic  CV: no JVD  RESP: no increased work of breathing, no use of accessory muscles  GI/ABD: soft, appropriately tender, mildly distended, midline incision with two staples around umbilicus draining serosanguinous fluid, incision otherwise c/d/i, some mild skin irritation from tape,     : deferred  EXTREMITIES: warm, pink, well-perfused    LABS                        11.4   6.43  )-----------( 248      ( 13 Sep 2017 05:04 )             35.8     09-13    142  |  105  |  12  ----------------------------<  98  4.0   |  25  |  0.86    Ca    9.3      13 Sep 2017 05:08  Phos  4.0     09-13  Mg     1.9     09-13

## 2017-09-14 NOTE — PROGRESS NOTE ADULT - ASSESSMENT
67y Male POD#8 s/p partial gastrectomy and j tube placement    PLAN:    - Bariatric LRD diet  - Continue current cardiac regimen  - Bacitracin on abdominal skin tears   - DVT ppx  - Pain control  Dispo: D/c today

## 2017-09-14 NOTE — DISCHARGE NOTE ADULT - MEDICATION SUMMARY - MEDICATIONS TO CHANGE
I will SWITCH the dose or number of times a day I take the medications listed below when I get home from the hospital:    sotalol 80 mg oral tablet  -- 1 tab(s) by mouth 2 times a day  -- It is very important that you take or use this exactly as directed.  Do not skip doses or discontinue unless directed by your doctor.  May cause drowsiness.  Alcohol may intensify this effect.  Use care when operating dangerous machinery.  Some non-prescription drugs may aggravate your condition.  Read all labels carefully.  If a warning appears, check with your doctor before taking.    dilTIAZem 120 mg/24 hours oral tablet, extended release  -- 1 tab(s) by mouth once a day in pm I will SWITCH the dose or number of times a day I take the medications listed below when I get home from the hospital:  None

## 2017-09-14 NOTE — DISCHARGE NOTE ADULT - HOSPITAL COURSE
67 year old male with a history of hypertension, diabetes, A-fib, COPD, Hep B, dyslipidemia, and BPH presents with gastric cancer diagnosed 3 months ago. Mediport placed and chemo was started. Patient is now scheduled for an upper endoscopy, robotic assisted gastrectomy, possible total gastrectomy and feeding jejunostomy on 9/5/17.     Pt is status post exploratory laparotomy, esophagogastroduodenoscopy, proximal gastrectomy, feeding jejunostomy, umbilical hernia repair and liver biopsy.     Post operatively, patient used a PCA for pain control and was kept NPO with an NGT. 67 year old male with a history of hypertension, diabetes, A-fib, COPD, Hep B, dyslipidemia, and BPH presents with gastric cancer diagnosed 3 months ago. Mediport placed and chemo was started. Patient is now scheduled for an upper endoscopy, robotic assisted gastrectomy, possible total gastrectomy and feeding jejunostomy on 9/5/17.     Pt is status post exploratory laparotomy, esophagogastroduodenoscopy, proximal gastrectomy, feeding jejunostomy, umbilical hernia repair and liver biopsy.     Post operatively, patient used a PCA for pain control and was kept NPO with an NGT.      Patient's afib medications were increased and cardiology Dr. Hinds following.  Patient given lovenox teaching prior to discharge and discharged in stable condition to follow up as an outpatient 67 year old male with a history of hypertension, diabetes, A-fib, COPD, Hep B, dyslipidemia, and BPH presents with gastric cancer diagnosed 3 months ago. Mediport placed and chemo was started. Patient is now scheduled for an upper endoscopy, robotic assisted gastrectomy, possible total gastrectomy and feeding jejunostomy on 9/5/17.     Pt is status post exploratory laparotomy, esophagogastroduodenoscopy, proximal gastrectomy, feeding jejunostomy, umbilical hernia repair and liver biopsy.     Post operatively, patient used a PCA for pain control and was kept NPO with an NGT. Patient had a J tube study and J tube feeds were started.  Patient in rapid afib and cardiology called and patient placed on cardizem gtt.  Patient had an esophagram and PO feeds were started. Cardizem gtt was switched to PO  As patient tolerated PO feeds, tube feeds were tapered off.  Patient's afib medications were increased and cardiology Dr. Hinds following.  Patient given lovenox teaching prior to discharge and discharged in stable condition to follow up as an outpatient

## 2017-09-14 NOTE — DISCHARGE NOTE ADULT - CARE PROVIDER_API CALL
Max Martinez), ColonRectal Surgery; Surgery  [address] [place] [address]  Phone: [phone]  Fax: [phone]

## 2017-09-18 ENCOUNTER — APPOINTMENT (OUTPATIENT)
Dept: SURGICAL ONCOLOGY | Facility: CLINIC | Age: 67
End: 2017-09-18
Payer: MEDICARE

## 2017-09-18 VITALS
WEIGHT: 184 LBS | RESPIRATION RATE: 14 BRPM | HEART RATE: 105 BPM | BODY MASS INDEX: 28.88 KG/M2 | DIASTOLIC BLOOD PRESSURE: 70 MMHG | HEIGHT: 67 IN | SYSTOLIC BLOOD PRESSURE: 109 MMHG | TEMPERATURE: 99.3 F

## 2017-09-18 PROCEDURE — 99024 POSTOP FOLLOW-UP VISIT: CPT

## 2017-09-18 PROCEDURE — 49422 REMOVE TUNNELED IP CATH: CPT | Mod: 79

## 2017-09-25 ENCOUNTER — APPOINTMENT (OUTPATIENT)
Dept: SURGICAL ONCOLOGY | Facility: CLINIC | Age: 67
End: 2017-09-25
Payer: MEDICARE

## 2017-09-25 VITALS
DIASTOLIC BLOOD PRESSURE: 56 MMHG | HEIGHT: 67 IN | WEIGHT: 178 LBS | SYSTOLIC BLOOD PRESSURE: 93 MMHG | TEMPERATURE: 99.1 F | HEART RATE: 108 BPM | BODY MASS INDEX: 27.94 KG/M2 | RESPIRATION RATE: 15 BRPM

## 2017-09-25 PROCEDURE — 99024 POSTOP FOLLOW-UP VISIT: CPT

## 2017-09-25 PROCEDURE — 10180 I&D COMPLEX PO WOUND INFCTJ: CPT | Mod: 79

## 2017-09-25 RX ORDER — RIFAXIMIN 550 MG/1
550 TABLET ORAL
Qty: 42 | Refills: 0 | Status: ACTIVE | COMMUNITY
Start: 2017-05-16

## 2017-09-25 RX ORDER — METOPROLOL TARTRATE 25 MG/1
25 TABLET, FILM COATED ORAL
Qty: 90 | Refills: 0 | Status: ACTIVE | COMMUNITY
Start: 2017-06-25

## 2017-09-25 RX ORDER — IPRATROPIUM BROMIDE 42 UG/1
0.06 SPRAY NASAL
Qty: 15 | Refills: 0 | Status: ACTIVE | COMMUNITY
Start: 2017-08-10

## 2017-09-25 RX ORDER — MUPIROCIN 20 MG/G
2 OINTMENT TOPICAL
Qty: 22 | Refills: 0 | Status: ACTIVE | COMMUNITY
Start: 2017-08-11

## 2017-09-25 RX ORDER — TENOFOVIR ALAFENAMIDE 25 MG/1
25 TABLET ORAL
Qty: 30 | Refills: 0 | Status: ACTIVE | COMMUNITY
Start: 2017-09-05

## 2017-09-25 RX ORDER — DILTIAZEM HYDROCHLORIDE 120 MG/1
120 CAPSULE, EXTENDED RELEASE ORAL
Qty: 30 | Refills: 0 | Status: ACTIVE | COMMUNITY
Start: 2017-05-30

## 2017-09-25 RX ORDER — CLONAZEPAM 0.5 MG/1
0.5 TABLET ORAL
Qty: 45 | Refills: 0 | Status: ACTIVE | COMMUNITY
Start: 2017-08-04

## 2017-09-25 RX ORDER — FLUCONAZOLE 40 MG/ML
40 POWDER, FOR SUSPENSION ORAL
Qty: 35 | Refills: 0 | Status: ACTIVE | COMMUNITY
Start: 2017-09-19

## 2017-09-25 RX ORDER — CYCLOSPORINE 0.5 MG/ML
0.05 EMULSION OPHTHALMIC
Qty: 60 | Refills: 0 | Status: ACTIVE | COMMUNITY
Start: 2017-04-20

## 2017-09-25 RX ORDER — SERTRALINE HYDROCHLORIDE 50 MG/1
50 TABLET, FILM COATED ORAL
Qty: 30 | Refills: 0 | Status: ACTIVE | COMMUNITY
Start: 2017-08-04

## 2017-09-25 RX ORDER — DILTIAZEM HYDROCHLORIDE 60 MG/1
60 TABLET ORAL
Qty: 120 | Refills: 0 | Status: ACTIVE | COMMUNITY
Start: 2017-09-14

## 2017-09-25 RX ORDER — MEMANTINE HYDROCHLORIDE 28 MG/1
28 CAPSULE, EXTENDED RELEASE ORAL
Qty: 30 | Refills: 0 | Status: ACTIVE | COMMUNITY
Start: 2017-05-05

## 2017-09-25 RX ORDER — MESALAMINE 1000 MG/1
1000 SUPPOSITORY RECTAL
Qty: 30 | Refills: 0 | Status: ACTIVE | COMMUNITY
Start: 2017-05-05

## 2017-09-25 RX ORDER — LIDOCAINE AND PRILOCAINE 25; 25 MG/G; MG/G
2.5-2.5 CREAM TOPICAL
Qty: 30 | Refills: 0 | Status: ACTIVE | COMMUNITY
Start: 2017-07-14

## 2017-09-25 RX ORDER — LOPERAMIDE HYDROCHLORIDE 2 MG/1
2 CAPSULE ORAL
Qty: 30 | Refills: 0 | Status: ACTIVE | COMMUNITY
Start: 2017-07-18

## 2017-09-25 RX ORDER — LEVOFLOXACIN 750 MG/1
750 TABLET, FILM COATED ORAL
Qty: 4 | Refills: 0 | Status: ACTIVE | COMMUNITY
Start: 2017-07-26

## 2017-09-25 RX ORDER — DEXAMETHASONE 2 MG/1
2 TABLET ORAL
Qty: 40 | Refills: 0 | Status: ACTIVE | COMMUNITY
Start: 2017-06-07

## 2017-09-25 RX ORDER — ARIPIPRAZOLE 5 MG/1
5 TABLET ORAL
Qty: 30 | Refills: 0 | Status: ACTIVE | COMMUNITY
Start: 2017-08-25

## 2017-09-25 RX ORDER — SOTALOL HYDROCHLORIDE 80 MG/1
80 TABLET ORAL
Qty: 30 | Refills: 0 | Status: ACTIVE | COMMUNITY
Start: 2017-06-25

## 2017-09-25 RX ORDER — MONTELUKAST 10 MG/1
10 TABLET, FILM COATED ORAL
Qty: 30 | Refills: 0 | Status: ACTIVE | COMMUNITY
Start: 2017-09-25

## 2017-09-25 RX ORDER — POLYETHYLENE GLYCOL-3350, SODIUM CHLORIDE, POTASSIUM CHLORIDE AND SODIUM BICARBONATE 420; 11.2; 5.72; 1.48 G/438.4G; G/438.4G; G/438.4G; G/438.4G
420 POWDER, FOR SOLUTION ORAL
Qty: 4000 | Refills: 0 | Status: ACTIVE | COMMUNITY
Start: 2017-05-08

## 2017-09-25 RX ORDER — NYSTATIN 100000 [USP'U]/G
100000 CREAM TOPICAL
Qty: 30 | Refills: 0 | Status: ACTIVE | COMMUNITY
Start: 2017-09-15

## 2017-09-25 RX ORDER — METFORMIN HYDROCHLORIDE 500 MG/1
500 TABLET, COATED ORAL
Qty: 90 | Refills: 0 | Status: ACTIVE | COMMUNITY
Start: 2017-09-25

## 2017-09-25 RX ORDER — MOMETASONE FUROATE 1 MG/ML
0.1 SOLUTION TOPICAL
Qty: 30 | Refills: 0 | Status: ACTIVE | COMMUNITY
Start: 2017-09-25

## 2017-09-25 RX ORDER — ENOXAPARIN SODIUM 100 MG/ML
40 INJECTION SUBCUTANEOUS
Qty: 11 | Refills: 0 | Status: ACTIVE | COMMUNITY
Start: 2017-09-14

## 2017-09-25 RX ORDER — ENTECAVIR 0.5 MG/1
0.5 TABLET, FILM COATED ORAL
Qty: 30 | Refills: 0 | Status: ACTIVE | COMMUNITY
Start: 2017-08-07

## 2017-09-25 RX ORDER — KETOCONAZOLE 20 MG/G
2 CREAM TOPICAL
Qty: 60 | Refills: 0 | Status: ACTIVE | COMMUNITY
Start: 2017-04-21

## 2017-09-25 RX ORDER — SOTALOL HYDROCHLORIDE TABLES AF 80 MG/1
80 TABLET ORAL
Qty: 60 | Refills: 0 | Status: ACTIVE | COMMUNITY
Start: 2017-08-14

## 2017-09-25 RX ORDER — DICYCLOMINE HYDROCHLORIDE 10 MG/1
10 CAPSULE ORAL
Qty: 30 | Refills: 0 | Status: ACTIVE | COMMUNITY
Start: 2017-05-26

## 2017-09-25 RX ORDER — DICLOFENAC SODIUM 10 MG/G
1 GEL TOPICAL
Qty: 100 | Refills: 0 | Status: ACTIVE | COMMUNITY
Start: 2017-08-11

## 2017-09-25 RX ORDER — DILTIAZEM HYDROCHLORIDE 240 MG/1
240 CAPSULE, EXTENDED RELEASE ORAL
Qty: 30 | Refills: 0 | Status: ACTIVE | COMMUNITY
Start: 2017-06-25

## 2017-09-25 RX ORDER — ONDANSETRON 4 MG/1
4 TABLET ORAL
Qty: 20 | Refills: 0 | Status: ACTIVE | COMMUNITY
Start: 2017-08-07

## 2017-09-25 RX ORDER — TAMSULOSIN HYDROCHLORIDE 0.4 MG/1
0.4 CAPSULE ORAL
Qty: 60 | Refills: 0 | Status: ACTIVE | COMMUNITY
Start: 2017-04-17

## 2017-10-02 ENCOUNTER — APPOINTMENT (OUTPATIENT)
Dept: SURGICAL ONCOLOGY | Facility: CLINIC | Age: 67
End: 2017-10-02
Payer: MEDICARE

## 2017-10-02 VITALS
OXYGEN SATURATION: 99 % | WEIGHT: 175 LBS | BODY MASS INDEX: 27.47 KG/M2 | SYSTOLIC BLOOD PRESSURE: 120 MMHG | HEART RATE: 93 BPM | DIASTOLIC BLOOD PRESSURE: 81 MMHG | TEMPERATURE: 98.3 F | HEIGHT: 67 IN

## 2017-10-02 PROCEDURE — 99024 POSTOP FOLLOW-UP VISIT: CPT

## 2017-11-17 ENCOUNTER — INPATIENT (INPATIENT)
Facility: HOSPITAL | Age: 67
LOS: 1 days | Discharge: HOSPICE | DRG: 178 | End: 2017-11-19
Attending: GENERAL PRACTICE | Admitting: GENERAL PRACTICE
Payer: MEDICARE

## 2017-11-17 VITALS
TEMPERATURE: 99 F | HEART RATE: 114 BPM | WEIGHT: 175.05 LBS | HEIGHT: 67 IN | DIASTOLIC BLOOD PRESSURE: 97 MMHG | SYSTOLIC BLOOD PRESSURE: 187 MMHG | RESPIRATION RATE: 24 BRPM | OXYGEN SATURATION: 85 %

## 2017-11-17 DIAGNOSIS — N17.9 ACUTE KIDNEY FAILURE, UNSPECIFIED: ICD-10-CM

## 2017-11-17 DIAGNOSIS — Z29.9 ENCOUNTER FOR PROPHYLACTIC MEASURES, UNSPECIFIED: ICD-10-CM

## 2017-11-17 DIAGNOSIS — Z98.890 OTHER SPECIFIED POSTPROCEDURAL STATES: Chronic | ICD-10-CM

## 2017-11-17 DIAGNOSIS — Z86.79 PERSONAL HISTORY OF OTHER DISEASES OF THE CIRCULATORY SYSTEM: ICD-10-CM

## 2017-11-17 DIAGNOSIS — Z90.89 ACQUIRED ABSENCE OF OTHER ORGANS: Chronic | ICD-10-CM

## 2017-11-17 DIAGNOSIS — J18.9 PNEUMONIA, UNSPECIFIED ORGANISM: ICD-10-CM

## 2017-11-17 DIAGNOSIS — I48.91 UNSPECIFIED ATRIAL FIBRILLATION: ICD-10-CM

## 2017-11-17 DIAGNOSIS — E11.9 TYPE 2 DIABETES MELLITUS WITHOUT COMPLICATIONS: ICD-10-CM

## 2017-11-17 DIAGNOSIS — C16.9 MALIGNANT NEOPLASM OF STOMACH, UNSPECIFIED: ICD-10-CM

## 2017-11-17 DIAGNOSIS — B19.10 UNSPECIFIED VIRAL HEPATITIS B WITHOUT HEPATIC COMA: ICD-10-CM

## 2017-11-17 DIAGNOSIS — J44.9 CHRONIC OBSTRUCTIVE PULMONARY DISEASE, UNSPECIFIED: ICD-10-CM

## 2017-11-17 DIAGNOSIS — Z90.3 ACQUIRED ABSENCE OF STOMACH [PART OF]: Chronic | ICD-10-CM

## 2017-11-17 LAB
ALBUMIN SERPL ELPH-MCNC: 3.6 G/DL — SIGNIFICANT CHANGE UP (ref 3.5–5)
ALP SERPL-CCNC: 212 U/L — HIGH (ref 40–120)
ALT FLD-CCNC: 28 U/L DA — SIGNIFICANT CHANGE UP (ref 10–60)
ANION GAP SERPL CALC-SCNC: 10 MMOL/L — SIGNIFICANT CHANGE UP (ref 5–17)
APPEARANCE UR: CLEAR — SIGNIFICANT CHANGE UP
APTT BLD: 20 SEC — LOW (ref 27.5–37.4)
AST SERPL-CCNC: 18 U/L — SIGNIFICANT CHANGE UP (ref 10–40)
BACTERIA # UR AUTO: ABNORMAL /HPF
BASE EXCESS BLDA CALC-SCNC: -4 MMOL/L — LOW (ref -2–2)
BASOPHILS # BLD AUTO: 0 K/UL — SIGNIFICANT CHANGE UP (ref 0–0.2)
BASOPHILS NFR BLD AUTO: 0.2 % — SIGNIFICANT CHANGE UP (ref 0–2)
BILIRUB SERPL-MCNC: 0.3 MG/DL — SIGNIFICANT CHANGE UP (ref 0.2–1.2)
BILIRUB UR-MCNC: NEGATIVE — SIGNIFICANT CHANGE UP
BLD GP AB SCN SERPL QL: SIGNIFICANT CHANGE UP
BLOOD GAS COMMENTS ARTERIAL: SIGNIFICANT CHANGE UP
BUN SERPL-MCNC: 19 MG/DL — HIGH (ref 7–18)
CALCIUM SERPL-MCNC: 8.8 MG/DL — SIGNIFICANT CHANGE UP (ref 8.4–10.5)
CHLORIDE SERPL-SCNC: 109 MMOL/L — HIGH (ref 96–108)
CO2 SERPL-SCNC: 24 MMOL/L — SIGNIFICANT CHANGE UP (ref 22–31)
COD CRY URNS QL: ABNORMAL /HPF
COLOR SPEC: YELLOW — SIGNIFICANT CHANGE UP
CREAT SERPL-MCNC: 1.33 MG/DL — HIGH (ref 0.5–1.3)
DIFF PNL FLD: NEGATIVE — SIGNIFICANT CHANGE UP
EOSINOPHIL # BLD AUTO: 0 K/UL — SIGNIFICANT CHANGE UP (ref 0–0.5)
EOSINOPHIL NFR BLD AUTO: 0.2 % — SIGNIFICANT CHANGE UP (ref 0–6)
EPI CELLS # UR: ABNORMAL /HPF
GLUCOSE BLDC GLUCOMTR-MCNC: 121 MG/DL — HIGH (ref 70–99)
GLUCOSE SERPL-MCNC: 155 MG/DL — HIGH (ref 70–99)
GLUCOSE UR QL: NEGATIVE — SIGNIFICANT CHANGE UP
HCO3 BLDA-SCNC: 20 MMOL/L — LOW (ref 23–27)
HCT VFR BLD CALC: 45.5 % — SIGNIFICANT CHANGE UP (ref 39–50)
HGB BLD-MCNC: 14.2 G/DL — SIGNIFICANT CHANGE UP (ref 13–17)
HOROWITZ INDEX BLDA+IHG-RTO: 21 — SIGNIFICANT CHANGE UP
INR BLD: 1.27 RATIO — HIGH (ref 0.88–1.16)
KETONES UR-MCNC: NEGATIVE — SIGNIFICANT CHANGE UP
LACTATE SERPL-SCNC: 3.7 MMOL/L — HIGH (ref 0.7–2)
LEUKOCYTE ESTERASE UR-ACNC: NEGATIVE — SIGNIFICANT CHANGE UP
LYMPHOCYTES # BLD AUTO: 0.9 K/UL — LOW (ref 1–3.3)
LYMPHOCYTES # BLD AUTO: 7.2 % — LOW (ref 13–44)
MCHC RBC-ENTMCNC: 28 PG — SIGNIFICANT CHANGE UP (ref 27–34)
MCHC RBC-ENTMCNC: 31.1 GM/DL — LOW (ref 32–36)
MCV RBC AUTO: 89.8 FL — SIGNIFICANT CHANGE UP (ref 80–100)
MONOCYTES # BLD AUTO: 0.1 K/UL — SIGNIFICANT CHANGE UP (ref 0–0.9)
MONOCYTES NFR BLD AUTO: 1.1 % — LOW (ref 2–14)
NEUTROPHILS # BLD AUTO: 11.6 K/UL — HIGH (ref 1.8–7.4)
NEUTROPHILS NFR BLD AUTO: 91.3 % — HIGH (ref 43–77)
NITRITE UR-MCNC: NEGATIVE — SIGNIFICANT CHANGE UP
PCO2 BLDA: 35 MMHG — SIGNIFICANT CHANGE UP (ref 32–46)
PH BLDA: 7.38 — SIGNIFICANT CHANGE UP (ref 7.35–7.45)
PH UR: 5 — SIGNIFICANT CHANGE UP (ref 5–8)
PLATELET # BLD AUTO: 159 K/UL — SIGNIFICANT CHANGE UP (ref 150–400)
PO2 BLDA: 45 MMHG — CRITICAL LOW (ref 74–108)
POTASSIUM SERPL-MCNC: 4.8 MMOL/L — SIGNIFICANT CHANGE UP (ref 3.5–5.3)
POTASSIUM SERPL-SCNC: 4.8 MMOL/L — SIGNIFICANT CHANGE UP (ref 3.5–5.3)
PROT SERPL-MCNC: 6.9 G/DL — SIGNIFICANT CHANGE UP (ref 6–8.3)
PROT UR-MCNC: 15
PROTHROM AB SERPL-ACNC: 13.9 SEC — HIGH (ref 9.8–12.7)
RBC # BLD: 5.06 M/UL — SIGNIFICANT CHANGE UP (ref 4.2–5.8)
RBC # FLD: 15.1 % — HIGH (ref 10.3–14.5)
RBC CASTS # UR COMP ASSIST: SIGNIFICANT CHANGE UP /HPF (ref 0–2)
SAO2 % BLDA: 80 % — LOW (ref 92–96)
SODIUM SERPL-SCNC: 143 MMOL/L — SIGNIFICANT CHANGE UP (ref 135–145)
SP GR SPEC: 1.01 — SIGNIFICANT CHANGE UP (ref 1.01–1.02)
UROBILINOGEN FLD QL: NEGATIVE — SIGNIFICANT CHANGE UP
WBC # BLD: 12.7 K/UL — HIGH (ref 3.8–10.5)
WBC # FLD AUTO: 12.7 K/UL — HIGH (ref 3.8–10.5)
WBC UR QL: SIGNIFICANT CHANGE UP /HPF (ref 0–5)

## 2017-11-17 PROCEDURE — 71010: CPT | Mod: 26

## 2017-11-17 PROCEDURE — 93010 ELECTROCARDIOGRAM REPORT: CPT

## 2017-11-17 PROCEDURE — 99285 EMERGENCY DEPT VISIT HI MDM: CPT

## 2017-11-17 PROCEDURE — 74177 CT ABD & PELVIS W/CONTRAST: CPT | Mod: 26

## 2017-11-17 PROCEDURE — 71275 CT ANGIOGRAPHY CHEST: CPT | Mod: 26

## 2017-11-17 RX ORDER — SUCRALFATE 1 G
1 TABLET ORAL
Qty: 0 | Refills: 0 | Status: DISCONTINUED | OUTPATIENT
Start: 2017-11-17 | End: 2017-11-19

## 2017-11-17 RX ORDER — TENOFOVIR DISOPROXIL FUMARATE 300 MG/1
1 TABLET, FILM COATED ORAL
Qty: 0 | Refills: 0 | COMMUNITY

## 2017-11-17 RX ORDER — DILTIAZEM HCL 120 MG
120 CAPSULE, EXT RELEASE 24 HR ORAL DAILY
Qty: 0 | Refills: 0 | Status: DISCONTINUED | OUTPATIENT
Start: 2017-11-17 | End: 2017-11-18

## 2017-11-17 RX ORDER — LIPASE/PROTEASE/AMYLASE 16-48-48K
0 CAPSULE,DELAYED RELEASE (ENTERIC COATED) ORAL
Qty: 60 | Refills: 0 | COMMUNITY

## 2017-11-17 RX ORDER — SODIUM CHLORIDE 9 MG/ML
500 INJECTION INTRAMUSCULAR; INTRAVENOUS; SUBCUTANEOUS
Qty: 0 | Refills: 0 | Status: COMPLETED | OUTPATIENT
Start: 2017-11-17 | End: 2017-11-17

## 2017-11-17 RX ORDER — DEXLANSOPRAZOLE 30 MG/1
1 CAPSULE, DELAYED RELEASE ORAL
Qty: 0 | Refills: 0 | COMMUNITY

## 2017-11-17 RX ORDER — ACETAMINOPHEN 500 MG
650 TABLET ORAL EVERY 6 HOURS
Qty: 0 | Refills: 0 | Status: DISCONTINUED | OUTPATIENT
Start: 2017-11-17 | End: 2017-11-19

## 2017-11-17 RX ORDER — ENTECAVIR 0.5 MG/1
0.5 TABLET ORAL DAILY
Qty: 0 | Refills: 0 | Status: DISCONTINUED | OUTPATIENT
Start: 2017-11-17 | End: 2017-11-19

## 2017-11-17 RX ORDER — PIPERACILLIN AND TAZOBACTAM 4; .5 G/20ML; G/20ML
3.38 INJECTION, POWDER, LYOPHILIZED, FOR SOLUTION INTRAVENOUS EVERY 8 HOURS
Qty: 0 | Refills: 0 | Status: DISCONTINUED | OUTPATIENT
Start: 2017-11-17 | End: 2017-11-18

## 2017-11-17 RX ORDER — CYCLOSPORINE 0.5 MG/ML
1 EMULSION OPHTHALMIC
Qty: 0 | Refills: 0 | COMMUNITY

## 2017-11-17 RX ORDER — DICLOFENAC SODIUM 30 MG/G
0 GEL TOPICAL
Qty: 100 | Refills: 0 | COMMUNITY

## 2017-11-17 RX ORDER — SODIUM CHLORIDE 9 MG/ML
3 INJECTION INTRAMUSCULAR; INTRAVENOUS; SUBCUTANEOUS ONCE
Qty: 0 | Refills: 0 | Status: COMPLETED | OUTPATIENT
Start: 2017-11-17 | End: 2017-11-17

## 2017-11-17 RX ORDER — IPRATROPIUM/ALBUTEROL SULFATE 18-103MCG
3 AEROSOL WITH ADAPTER (GRAM) INHALATION EVERY 6 HOURS
Qty: 0 | Refills: 0 | Status: DISCONTINUED | OUTPATIENT
Start: 2017-11-17 | End: 2017-11-19

## 2017-11-17 RX ORDER — SERTRALINE 25 MG/1
25 TABLET, FILM COATED ORAL DAILY
Qty: 0 | Refills: 0 | Status: DISCONTINUED | OUTPATIENT
Start: 2017-11-17 | End: 2017-11-18

## 2017-11-17 RX ORDER — MOMETASONE FUROATE 1 MG/G
0 CREAM TOPICAL
Qty: 30 | Refills: 0 | COMMUNITY

## 2017-11-17 RX ORDER — TAMSULOSIN HYDROCHLORIDE 0.4 MG/1
1 CAPSULE ORAL
Qty: 0 | Refills: 0 | COMMUNITY

## 2017-11-17 RX ORDER — ONDANSETRON 8 MG/1
0 TABLET, FILM COATED ORAL
Qty: 20 | Refills: 0 | COMMUNITY

## 2017-11-17 RX ORDER — LIDOCAINE 4 G/100G
0 CREAM TOPICAL
Qty: 100 | Refills: 0 | COMMUNITY

## 2017-11-17 RX ORDER — ARIPIPRAZOLE 15 MG/1
0 TABLET ORAL
Qty: 30 | Refills: 0 | COMMUNITY

## 2017-11-17 RX ORDER — SUCRALFATE 1 G
0 TABLET ORAL
Qty: 420 | Refills: 0 | COMMUNITY

## 2017-11-17 RX ORDER — LOPERAMIDE HCL 2 MG
0 TABLET ORAL
Qty: 30 | Refills: 0 | COMMUNITY

## 2017-11-17 RX ORDER — IPRATROPIUM BROMIDE 21 MCG
0 AEROSOL, SPRAY (ML) NASAL
Qty: 15 | Refills: 0 | COMMUNITY

## 2017-11-17 RX ORDER — SODIUM CHLORIDE 9 MG/ML
1000 INJECTION INTRAMUSCULAR; INTRAVENOUS; SUBCUTANEOUS
Qty: 0 | Refills: 0 | Status: DISCONTINUED | OUTPATIENT
Start: 2017-11-17 | End: 2017-11-19

## 2017-11-17 RX ORDER — PANTOPRAZOLE SODIUM 20 MG/1
40 TABLET, DELAYED RELEASE ORAL
Qty: 0 | Refills: 0 | Status: DISCONTINUED | OUTPATIENT
Start: 2017-11-17 | End: 2017-11-19

## 2017-11-17 RX ORDER — CLONAZEPAM 1 MG
0 TABLET ORAL
Qty: 60 | Refills: 0 | COMMUNITY

## 2017-11-17 RX ORDER — MESALAMINE 400 MG
0 TABLET, DELAYED RELEASE (ENTERIC COATED) ORAL
Qty: 30 | Refills: 0 | COMMUNITY

## 2017-11-17 RX ORDER — MEMANTINE HYDROCHLORIDE 10 MG/1
1 TABLET ORAL
Qty: 0 | Refills: 0 | COMMUNITY

## 2017-11-17 RX ORDER — METFORMIN HYDROCHLORIDE 850 MG/1
1 TABLET ORAL
Qty: 0 | Refills: 0 | COMMUNITY

## 2017-11-17 RX ORDER — INFLUENZA VIRUS VACCINE 15; 15; 15; 15 UG/.5ML; UG/.5ML; UG/.5ML; UG/.5ML
0.5 SUSPENSION INTRAMUSCULAR ONCE
Qty: 0 | Refills: 0 | Status: COMPLETED | OUTPATIENT
Start: 2017-11-17 | End: 2017-11-17

## 2017-11-17 RX ORDER — BENAZEPRIL HYDROCHLORIDE 40 MG/1
0 TABLET ORAL
Qty: 30 | Refills: 0 | COMMUNITY

## 2017-11-17 RX ORDER — HEPARIN SODIUM 5000 [USP'U]/ML
5000 INJECTION INTRAVENOUS; SUBCUTANEOUS EVERY 8 HOURS
Qty: 0 | Refills: 0 | Status: DISCONTINUED | OUTPATIENT
Start: 2017-11-17 | End: 2017-11-19

## 2017-11-17 RX ORDER — LIPASE/PROTEASE/AMYLASE 16-48-48K
2 CAPSULE,DELAYED RELEASE (ENTERIC COATED) ORAL
Qty: 0 | Refills: 0 | Status: DISCONTINUED | OUTPATIENT
Start: 2017-11-17 | End: 2017-11-18

## 2017-11-17 RX ORDER — ASPIRIN/CALCIUM CARB/MAGNESIUM 324 MG
81 TABLET ORAL DAILY
Qty: 0 | Refills: 0 | Status: DISCONTINUED | OUTPATIENT
Start: 2017-11-17 | End: 2017-11-19

## 2017-11-17 RX ORDER — ERGOCALCIFEROL 1.25 MG/1
1 CAPSULE ORAL
Qty: 0 | Refills: 0 | COMMUNITY

## 2017-11-17 RX ORDER — TAMSULOSIN HYDROCHLORIDE 0.4 MG/1
0 CAPSULE ORAL
Qty: 60 | Refills: 0 | COMMUNITY

## 2017-11-17 RX ORDER — MONTELUKAST 4 MG/1
10 TABLET, CHEWABLE ORAL DAILY
Qty: 0 | Refills: 0 | Status: DISCONTINUED | OUTPATIENT
Start: 2017-11-17 | End: 2017-11-19

## 2017-11-17 RX ORDER — SITAGLIPTIN 50 MG/1
0 TABLET, FILM COATED ORAL
Qty: 30 | Refills: 0 | COMMUNITY

## 2017-11-17 RX ORDER — MEMANTINE HYDROCHLORIDE 10 MG/1
10 TABLET ORAL
Qty: 0 | Refills: 0 | Status: DISCONTINUED | OUTPATIENT
Start: 2017-11-17 | End: 2017-11-18

## 2017-11-17 RX ORDER — SERTRALINE 25 MG/1
0 TABLET, FILM COATED ORAL
Qty: 30 | Refills: 0 | COMMUNITY

## 2017-11-17 RX ORDER — SOTALOL HCL 120 MG
0 TABLET ORAL
Qty: 30 | Refills: 0 | COMMUNITY

## 2017-11-17 RX ORDER — ATORVASTATIN CALCIUM 80 MG/1
0 TABLET, FILM COATED ORAL
Qty: 30 | Refills: 0 | COMMUNITY

## 2017-11-17 RX ORDER — ATORVASTATIN CALCIUM 80 MG/1
1 TABLET, FILM COATED ORAL
Qty: 0 | Refills: 0 | COMMUNITY

## 2017-11-17 RX ORDER — SOTALOL HCL 120 MG
80 TABLET ORAL
Qty: 0 | Refills: 0 | Status: DISCONTINUED | OUTPATIENT
Start: 2017-11-17 | End: 2017-11-18

## 2017-11-17 RX ORDER — KETOCONAZOLE 20 MG/G
0 AEROSOL, FOAM TOPICAL
Qty: 60 | Refills: 0 | COMMUNITY

## 2017-11-17 RX ORDER — MEMANTINE HYDROCHLORIDE 10 MG/1
0 TABLET ORAL
Qty: 30 | Refills: 0 | COMMUNITY

## 2017-11-17 RX ORDER — ONDANSETRON 8 MG/1
4 TABLET, FILM COATED ORAL ONCE
Qty: 0 | Refills: 0 | Status: COMPLETED | OUTPATIENT
Start: 2017-11-17 | End: 2017-11-17

## 2017-11-17 RX ORDER — INSULIN LISPRO 100/ML
VIAL (ML) SUBCUTANEOUS
Qty: 0 | Refills: 0 | Status: DISCONTINUED | OUTPATIENT
Start: 2017-11-17 | End: 2017-11-19

## 2017-11-17 RX ORDER — TIOTROPIUM BROMIDE 18 UG/1
1 CAPSULE ORAL; RESPIRATORY (INHALATION)
Qty: 0 | Refills: 0 | COMMUNITY

## 2017-11-17 RX ORDER — BUDESONIDE AND FORMOTEROL FUMARATE DIHYDRATE 160; 4.5 UG/1; UG/1
2 AEROSOL RESPIRATORY (INHALATION)
Qty: 0 | Refills: 0 | COMMUNITY

## 2017-11-17 RX ORDER — TENOFOVIR DISOPROXIL FUMARATE 300 MG/1
0 TABLET, FILM COATED ORAL
Qty: 30 | Refills: 0 | COMMUNITY

## 2017-11-17 RX ORDER — PANTOPRAZOLE SODIUM 20 MG/1
80 TABLET, DELAYED RELEASE ORAL ONCE
Qty: 0 | Refills: 0 | Status: COMPLETED | OUTPATIENT
Start: 2017-11-17 | End: 2017-11-17

## 2017-11-17 RX ORDER — ACETAMINOPHEN 500 MG
650 TABLET ORAL ONCE
Qty: 0 | Refills: 0 | Status: COMPLETED | OUTPATIENT
Start: 2017-11-17 | End: 2017-11-17

## 2017-11-17 RX ORDER — IPRATROPIUM/ALBUTEROL SULFATE 18-103MCG
3 AEROSOL WITH ADAPTER (GRAM) INHALATION
Qty: 0 | Refills: 0 | Status: COMPLETED | OUTPATIENT
Start: 2017-11-17 | End: 2017-11-17

## 2017-11-17 RX ADMIN — Medication 3 MILLILITER(S): at 17:40

## 2017-11-17 RX ADMIN — SERTRALINE 25 MILLIGRAM(S): 25 TABLET, FILM COATED ORAL at 23:33

## 2017-11-17 RX ADMIN — SODIUM CHLORIDE 2000 MILLILITER(S): 9 INJECTION INTRAMUSCULAR; INTRAVENOUS; SUBCUTANEOUS at 17:35

## 2017-11-17 RX ADMIN — Medication 3 MILLILITER(S): at 23:16

## 2017-11-17 RX ADMIN — SODIUM CHLORIDE 75 MILLILITER(S): 9 INJECTION INTRAMUSCULAR; INTRAVENOUS; SUBCUTANEOUS at 21:23

## 2017-11-17 RX ADMIN — Medication 3 MILLILITER(S): at 17:39

## 2017-11-17 RX ADMIN — PANTOPRAZOLE SODIUM 80 MILLIGRAM(S): 20 TABLET, DELAYED RELEASE ORAL at 17:43

## 2017-11-17 RX ADMIN — PIPERACILLIN AND TAZOBACTAM 12.5 GRAM(S): 4; .5 INJECTION, POWDER, LYOPHILIZED, FOR SOLUTION INTRAVENOUS at 22:09

## 2017-11-17 RX ADMIN — Medication 80 MILLIGRAM(S): at 23:33

## 2017-11-17 RX ADMIN — Medication 3 MILLILITER(S): at 17:43

## 2017-11-17 RX ADMIN — Medication 120 MILLIGRAM(S): at 23:33

## 2017-11-17 RX ADMIN — ONDANSETRON 4 MILLIGRAM(S): 8 TABLET, FILM COATED ORAL at 18:25

## 2017-11-17 RX ADMIN — SODIUM CHLORIDE 2000 MILLILITER(S): 9 INJECTION INTRAMUSCULAR; INTRAVENOUS; SUBCUTANEOUS at 19:04

## 2017-11-17 RX ADMIN — Medication 650 MILLIGRAM(S): at 23:32

## 2017-11-17 RX ADMIN — SODIUM CHLORIDE 2000 MILLILITER(S): 9 INJECTION INTRAMUSCULAR; INTRAVENOUS; SUBCUTANEOUS at 17:39

## 2017-11-17 RX ADMIN — Medication 650 MILLIGRAM(S): at 22:08

## 2017-11-17 RX ADMIN — Medication 1 GRAM(S): at 23:44

## 2017-11-17 RX ADMIN — SODIUM CHLORIDE 3 MILLILITER(S): 9 INJECTION INTRAMUSCULAR; INTRAVENOUS; SUBCUTANEOUS at 17:39

## 2017-11-17 RX ADMIN — SODIUM CHLORIDE 2000 MILLILITER(S): 9 INJECTION INTRAMUSCULAR; INTRAVENOUS; SUBCUTANEOUS at 19:03

## 2017-11-17 RX ADMIN — SODIUM CHLORIDE 2000 MILLILITER(S): 9 INJECTION INTRAMUSCULAR; INTRAVENOUS; SUBCUTANEOUS at 19:00

## 2017-11-17 NOTE — H&P ADULT - PROBLEM SELECTOR PLAN 8
-IMPROVE score of 4, will start Lovenox 40mg subcu q 24hrs for chemical DVT prophylaxis. -Not in acute exacerbation.   -Continue duoneb q 6hrs.  -Antibiotics for aspiration PNA.

## 2017-11-17 NOTE — H&P ADULT - PROBLEM SELECTOR PLAN 3
-On chemotherapy q 2 weeks, s/p dilatation of gastric stricture today.  -Family spoke with gastroenterologist post procedure, allowed to start pureed diet.  -Continue carafate and PPI at home dose

## 2017-11-17 NOTE — ED PROVIDER NOTE - OBJECTIVE STATEMENT
66 y/o M pt with PMHx of AFib, BPH, COPD, DM, Dyslipidemia, Gastric CA (currently on chemotherapy), Hepatitis B, HTN, Memory Change, and PNA and PSHx of Embolic Filter Insertion, Tonsillectomy, and Gastric Surgery (performed in September of 2017) BIB EMS to ED with hemoptysis and chills s/p gastric procedure performed today. Per EMS, pt had an endoscopy performed as well as a procedure performed on his stomach involving a balloon to expand and open the stomach (performed at 13:00pm); pt had a gastric surgery in the past leaving pt with only 20-30% of his stomach. Pt's daughter reports pt's sx's began approximately x40 minutes PTA in ED. Pt's daughter notes pt has a Hx of PNA as well. Pt denies abd pain, chest pain, or any other complaints. Pt also denies having had similar sx's in the past. Pt currently takes baby Aspirin daily; pt last took baby Aspirin this morning. GI physician: Dr. Rina Chopra (phone #: [phone]). Allergies: Sulfa Drugs (unknown). 68 y/o M pt with PMHx of AFib, BPH, COPD, DM, Dyslipidemia, Gastric CA (currently on chemotherapy), Hepatitis B, HTN, Memory Change, and PNA and PSHx of Embolic Filter Insertion, Tonsillectomy, Gastrectomy, and Gastric Surgery (performed in September of 2017) BIB EMS to ED with hemoptysis and chills s/p gastric procedure performed today. Per EMS, pt had an endoscopy performed as well as a procedure performed on his stomach involving a balloon to expand and open the stomach (performed at 13:00pm); pt had a gastric surgery in the past leaving pt with only 20-30% of his stomach. Pt's daughter reports pt's sx's began approximately x40 minutes PTA in ED. Pt's daughter notes pt has a Hx of PNA as well. Pt denies abd pain, chest pain, or any other complaints. Pt also denies having had similar sx's in the past. Pt currently takes baby Aspirin daily; pt last took baby Aspirin this morning. GI physician: Dr. Rina Chopra (phone #: [phone]). Allergies: Sulfa Drugs (unknown).

## 2017-11-17 NOTE — H&P ADULT - NSHPLABSRESULTS_GEN_ALL_CORE
LABS:                        14.2   12.7  )-----------( 159      ( 2017 17:52 )             45.5     11-    143  |  109<H>  |  19<H>  ----------------------------<  155<H>  4.8   |  24  |  1.33<H>    Ca    8.8      2017 17:53    TPro  6.9  /  Alb  3.6  /  TBili  0.3  /  DBili  x   /  AST  18  /  ALT  28  /  AlkPhos  212<H>  11-    PT/INR - ( 2017 17:52 )   PT: 13.9 sec;   INR: 1.27 ratio         PTT - ( 2017 17:52 )  PTT:20.0 sec  Urinalysis Basic - ( 2017 20:28 )    Color: Yellow / Appearance: Clear / S.010 / pH: x  Gluc: x / Ketone: Negative  / Bili: Negative / Urobili: Negative   Blood: x / Protein: 15 / Nitrite: Negative   Leuk Esterase: Negative / RBC: 0-2 /HPF / WBC 0-2 /HPF   Sq Epi: x / Non Sq Epi: Occasional /HPF / Bacteria: Trace /HPF      CAPILLARY BLOOD GLUCOSE        LIVER FUNCTIONS - ( 2017 17:53 )  Alb: 3.6 g/dL / Pro: 6.9 g/dL / ALK PHOS: 212 U/L / ALT: 28 U/L DA / AST: 18 U/L / GGT: x           ABG - ( 2017 18:02 )  pH: 7.38  /  pCO2: 35    /  pO2: 45    / HCO3: 20    / Base Excess: -4.0  /  SaO2: 80        < from: CT Angio Chest w/ IV Cont (17 @ 20:00) >    Findings:    Chest: No evidence for aortic dissection, or aneurysm. Aortic and   coronary artery calcifications are present. Stable device in the left   atrial appendage. No suspicious filling defect in the pulmonary arteries   to suggest pulmonary embolism.    New trace left pleural effusion. Small pericardial effusion. Mild   cardiomegaly. Hypodense lesions in the thyroid bilaterally measuring up   to 3.0 cm on the left. Mildly enlarged 1.1 cm right peritracheal lymph   node. Mildly enlarged 1.4 cm left hilar lymph node. No enlarged axillary   lymph node. No evidence for pneumomediastinum. The trachea and central   bronchi appear grossly unremarkable. No evidence for pneumothorax.    Left greater than right airspace opacifications in both lungs which   nodular feature, progressed since the previous examination, suggestive of   pneumonia.    Abdomen: New surgical clips and suture lines in the upper abdomen with   the interval absence of the stomach, presumably representing gastrectomy.   There is a stable small hypodense lesion in the left hepatic lobe, too   small to characterize. The gallbladder, pancreas, spleen, adrenals and   kidneys appear unremarkable. No evidence fora ureteral calculus. No   hydronephrosis. Stable extrarenal pelvises bilaterally.    Evaluation of the bowel structures is limited by lack of oral contrast.   The appendix appears normal. No bowel obstruction, or grossly thickened   bowel wall. Traceperihepatic and perisplenic ascites. No evidence for   free air, or enlarged lymph node. There is a stable chronic focal   infrarenal type B abdominal aortic dissection with calcification at the   dissection flap (image 63 series 7). There is a moderate stenosis at the   proximal SMA.    Pelvis: The urinary bladder appears unremarkable. The bowel structures   are grossly intact. No pelvic free fluid or enlarged lymph node.    Impression: New trace left pleural effusion. Small pericardial effusion.    Mild cardiomegaly.    Mildly enlarged 1.1 cm right paratracheal lymph node. Mildly enlarged 1.4   cm left hilar lymph node.    Left greater than right airspace opacifications in both lungs which   nodular feature, progressed since the previous examination, suggestive of   pneumonia.     The appendix appears normal. No bowel obstruction or grossly thickened   bowel wall.    Trace ascites.    Stable chronic focal infrarenal type B abdominal aortic dissection with   calcification at the dissection flap.    Moderate stenosis at the proximal SMA.  Other findings as above.

## 2017-11-17 NOTE — H&P ADULT - HISTORY OF PRESENT ILLNESS
67 years old male from home with PMH of hypertension, diabetes, A-fib, COPD, Hep B(On Entecavir and Tenofovir), dyslipidemia, BPH, Gastric Ca (s/p Rt RIJ mediport, currently on chemotherapy since 9/2017), Memory Change, h/o PNA, and PSHx of Embolic Filter Insertion, Tonsillectomy, exploratory laparotomy, proximal gastrectomy(9/2017), feeding jejunostomy, umbilical hernia repair and liver biopsy presents with 67 years old male from home walks with cane, lives alone, with PMH of hypertension, diabetes, A-fib(not on AC), COPD, Hep B(On Entecavir), dyslipidemia, BPH, Gastric Ca (s/p Rt IJ mediport, currently on chemotherapy since 9/2017, oxaliplatin and other med, last chemo 11/9/17), Mild dementia, h/o PNA, and PSHx of Embolic Filter Insertion(to the heart), Tonsillectomy, exploratory laparotomy, proximal gastrectomy(9/2017), feeding jejunostomy and reversal, umbilical hernia repair and liver biopsy presented with coughing up blood multiple times, chills, SOB and hypoxia after GI procedure(endoscopic stricture dilatation). Pt lied on left decubitus position during procedure, and after finishing up the procedure he became hypoxic to 80's so was brought to the ED for further evaluation. Till yesterday he was in usual state of health. He coughed up large amount of blood with mucus at least 2 times, each time about 1 cup, per family. Had some febrile sense as well. Denies having chest pain, abdominal pain, diarrhea, headache, or any other symptoms.     In ED, pt's initial /97, , RR 24, Sat 85% on room air, ABG pO2 45, Sat 80% on room air. Pt's saturation improved with 2L nasal cannula, to 97%. Labs significant for WBC of 12.7K with left shift, BUN/Cr 19/1.33, lactate of 3.7. CT chest showed new left pleural effusion and left greater than right airspace opacifications in both lungs which nodular feature, progressed since the previous examination, suggestive of pneumonia. Patient is admitted to the medicine floor for pneumonia likely from aspiration, hemoptysis likely from aspirated blood during GI procedure. 67 years old male from home walks with cane, lives alone, with PMH of hypertension, diabetes, A-fib(not on AC), COPD, Hep B(On Entecavir), dyslipidemia, BPH, Gastric Ca (s/p Rt IJ mediport, currently on chemotherapy since 9/2017, oxaliplatin and other med, last chemo 11/9/17), Mild dementia, h/o PNA, and PSHx of Embolic Filter Insertion(to the heart), Tonsillectomy, exploratory laparotomy, proximal gastrectomy(9/2017), feeding jejunostomy and reversal, umbilical hernia repair and liver biopsy presented with coughing up blood multiple times, chills, SOB and hypoxia after GI procedure(endoscopic stricture dilatation).   Pt lied on left decubitus position during procedure, and after finishing up the procedure he became hypoxic to 80's so was brought to the ED for further evaluation. Till yesterday he was in usual state of health. He coughed up large amount of blood with mucus at least 2 times, each time about 1 cup, per family. Had some febrile sense as well. Denies having chest pain, abdominal pain, diarrhea, headache, or any other symptoms.     In ED, pt's initial /97, , RR 24, Sat 85% on room air, ABG pO2 45, Sat 80% on room air. Pt's saturation improved with 2L nasal cannula, to 97%. Labs significant for WBC of 12.7K with left shift, BUN/Cr 19/1.33, lactate of 3.7. CT chest showed new left pleural effusion and left greater than right airspace opacifications in both lungs which nodular feature, progressed since the previous examination, suggestive of pneumonia. Patient is admitted to the medicine floor for pneumonia likely from aspiration, hemoptysis likely from aspirated blood during GI procedure.

## 2017-11-17 NOTE — H&P ADULT - PROBLEM SELECTOR PLAN 4
-No chest pain or palpitation. EKG stable.  -Continue Sotalol and cardizem at home dose, with aspirin  -No need for telemetry monitoring. -No chest pain or palpitation. EKG stable.  -Continue Sotalol and cardizem at home dose, with aspirin  -No need for telemetry monitoring at this time  - pt not on AC (?due to gastric cancer)

## 2017-11-17 NOTE — H&P ADULT - PROBLEM SELECTOR PLAN 1
-Pneumonia likely from aspiration, hemoptysis likely from aspirated blood during GI procedure.  -Starting Zosyn q 8hrs  -F/U blood culture  -Trend lactate, giving NS hydration. S/P 500cc NS in ED.  -O2 support with NC 2L. Monitor VS  -ID consult -Pneumonia likely from aspiration, hemoptysis likely from aspirated blood during GI procedure.  -Starting Rocephin + Metronidazole  -F/U blood culture  -Trend lactate, giving NS hydration. S/P 500cc NS in ED.  -O2 support with NC 2L. Monitor VS  -Will monitor patient clinically, if he develops another episode of hemoptysis, may need pulmonology consult later. Plans discussed with Dr. Kam.

## 2017-11-17 NOTE — H&P ADULT - PSH
History of embolic filter insertion  June 2017  History of gastrectomy    History of tonsillectomy    S/P gastric surgery

## 2017-11-17 NOTE — H&P ADULT - NSHPPHYSICALEXAM_GEN_ALL_CORE
PHYSICAL EXAM:  GENERAL: NAD, well-groomed, well-developed  HEAD:  Atraumatic, Normocephalic  EYES: EOMI, PERRLA, conjunctiva and sclera clear  ENMT: No tonsillar erythema, exudates, or enlargement; Moist mucous membranes, Good dentition, No lesions  NECK: Supple, No JVD, Normal thyroid  NERVOUS SYSTEM:  Alert & Oriented X3, Good concentration; Motor Strength 5/5 B/L upper and lower extremities; DTRs 2+ intact and symmetric  CHEST/LUNG: Clear to percussion bilaterally; No rales, rhonchi, wheezing, or rubs  HEART: Regular rate and rhythm; No murmurs, rubs, or gallops  ABDOMEN: Soft, Nontender, Nondistended; Bowel sounds present  EXTREMITIES:  2+ Peripheral Pulses bilaterally, No clubbing, cyanosis, or edema  LYMPH: No lymphadenopathy noted  SKIN: No rashes or lesions    T(C): 37.4 (11-17-17 @ 17:47), Max: 37.4 (11-17-17 @ 17:47)  HR: 114 (11-17-17 @ 17:09) (114 - 114)  BP: 187/97 (11-17-17 @ 17:09) (187/97 - 187/97)  RR: 24 (11-17-17 @ 17:09) (24 - 24)  SpO2: 85% (11-17-17 @ 17:09) (85% - 85%)  Wt(kg): --  I&O's Summary PHYSICAL EXAM:  GENERAL: NAD, well-groomed, well-developed  HEAD:  Atraumatic, Normocephalic  EYES: EOMI, PERRLA, anemic conjunctiva, sclera clear  ENMT: No tonsillar erythema, exudates, or enlargement; Moist mucous membranes  NECK: Supple, No JVD, Normal thyroid  NERVOUS SYSTEM:  Alert & Oriented X2(does not know date), Motor Strength 5/5 B/L upper and lower extremities  CHEST/LUNG: decreased left lung sound, No rales, rhonchi, wheezing, or rubs  HEART: Irregular rate and rhythm; No murmurs, rubs, or gallops  ABDOMEN: Soft, Nontender, Nondistended; Bowel sounds present  EXTREMITIES:  2+ Peripheral Pulses bilaterally, No clubbing, cyanosis, or edema  LYMPH: No lymphadenopathy noted  SKIN: No rashes or lesions    T(C): 37.4 (11-17-17 @ 17:47), Max: 37.4 (11-17-17 @ 17:47)  HR: 114 (11-17-17 @ 17:09) (114 - 114)  BP: 187/97 (11-17-17 @ 17:09) (187/97 - 187/97)  RR: 24 (11-17-17 @ 17:09) (24 - 24)  SpO2: 85% (11-17-17 @ 17:09) (85% - 85%)  Wt(kg): --  I&O's Summary

## 2017-11-17 NOTE — H&P ADULT - ASSESSMENT
Patient is a 67y old  Male who presents with a chief complaint of Patient is a 67y old  Male who presents with a chief complaint of Hemoptysis and chills after gastric procedure. Patient is admitted to the medicine floor for pneumonia likely from aspiration, hemoptysis likely from aspirated blood during GI procedure.

## 2017-11-17 NOTE — H&P ADULT - PROBLEM SELECTOR PLAN 2
-WANDY due to dehydration vs new baseline  -Will start NS IV hydration and f/u Cr in AM.  -Avoid nephrotoxins

## 2017-11-17 NOTE — ED PROVIDER NOTE - MEDICAL DECISION MAKING DETAILS
66 y/o M pt with Hx of gastrectomy and PNA presents with hemoptysis after recent GI procedure. Concern for PNA/aspiration. Will do labs, CXR, ABG, and reassess.

## 2017-11-17 NOTE — ED ADULT NURSE NOTE - ED STAT RN HANDOFF DETAILS 2
received  pt.in bed  at   1910  pt.is  alert and  oriented x3.denies pain. received  pt.in bed  at   1910  pt.is  alert and  oriented x3on  O2 3L via  n/c cont. .denies pain.admitted  to medicine for PNA.febrile,medicated   with tylenol po.transfer  to  620 report given to savanah panchal,.not  in  distress received  pt.in bed  at   1910  pt.is  alert and  oriented x3on  O2 3L via  n/c cont. .denies pain.admitted  to medicine for PNA.febrile,medicated   with tylenol po.refused turner cath...transfer  to  620 report given to savanah panchal,.not  in  distress

## 2017-11-17 NOTE — ED PROVIDER NOTE - CONTEXT
recent gastric procedure performed today; Hx of PNA recent gastric procedure performed today; Hx of PNA/coughing

## 2017-11-17 NOTE — ED PROVIDER NOTE - PMH
Afib    BPH (benign prostatic hyperplasia)    COPD (chronic obstructive pulmonary disease)    Diabetes    Dyslipidemia    Gastric cancer  chemo, has infuspaort  Hepatitis B    History of hypertension    Memory change    PNA (pneumonia)

## 2017-11-17 NOTE — H&P ADULT - PROBLEM SELECTOR PLAN 6
-Check Hba1c and monitor accucheck  -Hold home med metformin for 48hrs and Januvia, post IV contrast.  -Humalog sliding scale and consistent carb diet

## 2017-11-17 NOTE — ED PROVIDER NOTE - PSH
History of embolic filter insertion  June 2017  History of tonsillectomy    S/P gastric surgery History of embolic filter insertion  June 2017  History of gastrectomy    History of tonsillectomy    S/P gastric surgery

## 2017-11-18 LAB
ANION GAP SERPL CALC-SCNC: 10 MMOL/L — SIGNIFICANT CHANGE UP (ref 5–17)
BASOPHILS # BLD AUTO: 0 K/UL — SIGNIFICANT CHANGE UP (ref 0–0.2)
BASOPHILS NFR BLD AUTO: 0.2 % — SIGNIFICANT CHANGE UP (ref 0–2)
BUN SERPL-MCNC: 18 MG/DL — SIGNIFICANT CHANGE UP (ref 7–18)
CALCIUM SERPL-MCNC: 8.1 MG/DL — LOW (ref 8.4–10.5)
CHLORIDE SERPL-SCNC: 112 MMOL/L — HIGH (ref 96–108)
CHOLEST SERPL-MCNC: 69 MG/DL — SIGNIFICANT CHANGE UP (ref 10–199)
CO2 SERPL-SCNC: 23 MMOL/L — SIGNIFICANT CHANGE UP (ref 22–31)
CREAT SERPL-MCNC: 1.31 MG/DL — HIGH (ref 0.5–1.3)
EOSINOPHIL # BLD AUTO: 0 K/UL — SIGNIFICANT CHANGE UP (ref 0–0.5)
EOSINOPHIL NFR BLD AUTO: 0 % — SIGNIFICANT CHANGE UP (ref 0–6)
FOLATE SERPL-MCNC: >20 NG/ML — SIGNIFICANT CHANGE UP (ref 4.8–24.2)
GLUCOSE BLDC GLUCOMTR-MCNC: 104 MG/DL — HIGH (ref 70–99)
GLUCOSE BLDC GLUCOMTR-MCNC: 125 MG/DL — HIGH (ref 70–99)
GLUCOSE BLDC GLUCOMTR-MCNC: 84 MG/DL — SIGNIFICANT CHANGE UP (ref 70–99)
GLUCOSE BLDC GLUCOMTR-MCNC: 94 MG/DL — SIGNIFICANT CHANGE UP (ref 70–99)
GLUCOSE SERPL-MCNC: 109 MG/DL — HIGH (ref 70–99)
HCT VFR BLD CALC: 34.2 % — LOW (ref 39–50)
HCV AB S/CO SERPL IA: 0.12 S/CO — SIGNIFICANT CHANGE UP
HCV AB SERPL-IMP: SIGNIFICANT CHANGE UP
HDLC SERPL-MCNC: 30 MG/DL — LOW (ref 40–125)
HGB BLD-MCNC: 11 G/DL — LOW (ref 13–17)
LACTATE SERPL-SCNC: 1.5 MMOL/L — SIGNIFICANT CHANGE UP (ref 0.7–2)
LIPID PNL WITH DIRECT LDL SERPL: 21 MG/DL — SIGNIFICANT CHANGE UP
LYMPHOCYTES # BLD AUTO: 0.8 K/UL — LOW (ref 1–3.3)
LYMPHOCYTES # BLD AUTO: 5.6 % — LOW (ref 13–44)
MAGNESIUM SERPL-MCNC: 1.7 MG/DL — SIGNIFICANT CHANGE UP (ref 1.6–2.6)
MCHC RBC-ENTMCNC: 28.7 PG — SIGNIFICANT CHANGE UP (ref 27–34)
MCHC RBC-ENTMCNC: 32.1 GM/DL — SIGNIFICANT CHANGE UP (ref 32–36)
MCV RBC AUTO: 89.2 FL — SIGNIFICANT CHANGE UP (ref 80–100)
MONOCYTES # BLD AUTO: 0.7 K/UL — SIGNIFICANT CHANGE UP (ref 0–0.9)
MONOCYTES NFR BLD AUTO: 4.7 % — SIGNIFICANT CHANGE UP (ref 2–14)
NEUTROPHILS # BLD AUTO: 12.9 K/UL — HIGH (ref 1.8–7.4)
NEUTROPHILS NFR BLD AUTO: 89.4 % — HIGH (ref 43–77)
PHOSPHATE SERPL-MCNC: 3.6 MG/DL — SIGNIFICANT CHANGE UP (ref 2.5–4.5)
PLATELET # BLD AUTO: 110 K/UL — LOW (ref 150–400)
POTASSIUM SERPL-MCNC: 3.8 MMOL/L — SIGNIFICANT CHANGE UP (ref 3.5–5.3)
POTASSIUM SERPL-SCNC: 3.8 MMOL/L — SIGNIFICANT CHANGE UP (ref 3.5–5.3)
RBC # BLD: 3.84 M/UL — LOW (ref 4.2–5.8)
RBC # FLD: 15.5 % — HIGH (ref 10.3–14.5)
SODIUM SERPL-SCNC: 145 MMOL/L — SIGNIFICANT CHANGE UP (ref 135–145)
TOTAL CHOLESTEROL/HDL RATIO MEASUREMENT: 2.3 RATIO — LOW (ref 3.4–9.6)
TRIGL SERPL-MCNC: 92 MG/DL — SIGNIFICANT CHANGE UP (ref 10–149)
TSH SERPL-MCNC: 0.7 UU/ML — SIGNIFICANT CHANGE UP (ref 0.34–4.82)
VIT B12 SERPL-MCNC: >2000 PG/ML — HIGH (ref 243–894)
WBC # BLD: 14.4 K/UL — HIGH (ref 3.8–10.5)
WBC # FLD AUTO: 14.4 K/UL — HIGH (ref 3.8–10.5)

## 2017-11-18 RX ORDER — CEFTRIAXONE 500 MG/1
INJECTION, POWDER, FOR SOLUTION INTRAMUSCULAR; INTRAVENOUS
Qty: 0 | Refills: 0 | Status: DISCONTINUED | OUTPATIENT
Start: 2017-11-18 | End: 2017-11-19

## 2017-11-18 RX ORDER — MEMANTINE HYDROCHLORIDE 10 MG/1
10 TABLET ORAL EVERY 12 HOURS
Qty: 0 | Refills: 0 | Status: DISCONTINUED | OUTPATIENT
Start: 2017-11-18 | End: 2017-11-19

## 2017-11-18 RX ORDER — LIPASE/PROTEASE/AMYLASE 16-48-48K
2 CAPSULE,DELAYED RELEASE (ENTERIC COATED) ORAL
Qty: 0 | Refills: 0 | Status: CANCELLED | OUTPATIENT
Start: 2018-10-17 | End: 2017-11-19

## 2017-11-18 RX ORDER — DILTIAZEM HCL 120 MG
120 CAPSULE, EXT RELEASE 24 HR ORAL AT BEDTIME
Qty: 0 | Refills: 0 | Status: DISCONTINUED | OUTPATIENT
Start: 2017-11-18 | End: 2017-11-19

## 2017-11-18 RX ORDER — METRONIDAZOLE 500 MG
500 TABLET ORAL EVERY 8 HOURS
Qty: 0 | Refills: 0 | Status: DISCONTINUED | OUTPATIENT
Start: 2017-11-18 | End: 2017-11-19

## 2017-11-18 RX ORDER — SOTALOL HCL 120 MG
80 TABLET ORAL EVERY 12 HOURS
Qty: 0 | Refills: 0 | Status: DISCONTINUED | OUTPATIENT
Start: 2017-11-18 | End: 2017-11-19

## 2017-11-18 RX ORDER — METRONIDAZOLE 500 MG
500 TABLET ORAL ONCE
Qty: 0 | Refills: 0 | Status: COMPLETED | OUTPATIENT
Start: 2017-11-18 | End: 2017-11-18

## 2017-11-18 RX ORDER — CEFTRIAXONE 500 MG/1
1 INJECTION, POWDER, FOR SOLUTION INTRAMUSCULAR; INTRAVENOUS EVERY 24 HOURS
Qty: 0 | Refills: 0 | Status: DISCONTINUED | OUTPATIENT
Start: 2017-11-19 | End: 2017-11-19

## 2017-11-18 RX ORDER — METRONIDAZOLE 500 MG
TABLET ORAL
Qty: 0 | Refills: 0 | Status: DISCONTINUED | OUTPATIENT
Start: 2017-11-18 | End: 2017-11-19

## 2017-11-18 RX ORDER — CEFTRIAXONE 500 MG/1
1 INJECTION, POWDER, FOR SOLUTION INTRAMUSCULAR; INTRAVENOUS ONCE
Qty: 0 | Refills: 0 | Status: COMPLETED | OUTPATIENT
Start: 2017-11-18 | End: 2017-11-18

## 2017-11-18 RX ORDER — SERTRALINE 25 MG/1
25 TABLET, FILM COATED ORAL AT BEDTIME
Qty: 0 | Refills: 0 | Status: DISCONTINUED | OUTPATIENT
Start: 2017-11-18 | End: 2017-11-19

## 2017-11-18 RX ADMIN — SERTRALINE 25 MILLIGRAM(S): 25 TABLET, FILM COATED ORAL at 21:52

## 2017-11-18 RX ADMIN — Medication 1 GRAM(S): at 06:38

## 2017-11-18 RX ADMIN — Medication 30 MILLILITER(S): at 09:32

## 2017-11-18 RX ADMIN — Medication 100 MILLIGRAM(S): at 21:51

## 2017-11-18 RX ADMIN — Medication 1 GRAM(S): at 18:13

## 2017-11-18 RX ADMIN — Medication 120 MILLIGRAM(S): at 22:39

## 2017-11-18 RX ADMIN — CEFTRIAXONE 100 GRAM(S): 500 INJECTION, POWDER, FOR SOLUTION INTRAMUSCULAR; INTRAVENOUS at 08:47

## 2017-11-18 RX ADMIN — Medication 650 MILLIGRAM(S): at 21:51

## 2017-11-18 RX ADMIN — Medication 3 MILLILITER(S): at 14:25

## 2017-11-18 RX ADMIN — MEMANTINE HYDROCHLORIDE 10 MILLIGRAM(S): 10 TABLET ORAL at 18:13

## 2017-11-18 RX ADMIN — Medication 100 MILLIGRAM(S): at 08:49

## 2017-11-18 RX ADMIN — PIPERACILLIN AND TAZOBACTAM 12.5 GRAM(S): 4; .5 INJECTION, POWDER, LYOPHILIZED, FOR SOLUTION INTRAVENOUS at 06:38

## 2017-11-18 RX ADMIN — Medication 2 CAPSULE(S): at 08:50

## 2017-11-18 RX ADMIN — Medication 100 MILLIGRAM(S): at 14:13

## 2017-11-18 RX ADMIN — ENTECAVIR 0.5 MILLIGRAM(S): 0.5 TABLET ORAL at 13:15

## 2017-11-18 RX ADMIN — MONTELUKAST 10 MILLIGRAM(S): 4 TABLET, CHEWABLE ORAL at 13:15

## 2017-11-18 RX ADMIN — Medication 80 MILLIGRAM(S): at 21:52

## 2017-11-18 RX ADMIN — Medication 2 CAPSULE(S): at 13:26

## 2017-11-18 RX ADMIN — Medication 80 MILLIGRAM(S): at 06:36

## 2017-11-18 RX ADMIN — Medication 3 MILLILITER(S): at 20:11

## 2017-11-18 RX ADMIN — MEMANTINE HYDROCHLORIDE 10 MILLIGRAM(S): 10 TABLET ORAL at 06:38

## 2017-11-18 RX ADMIN — PANTOPRAZOLE SODIUM 40 MILLIGRAM(S): 20 TABLET, DELAYED RELEASE ORAL at 06:36

## 2017-11-18 RX ADMIN — Medication 3 MILLILITER(S): at 08:46

## 2017-11-18 RX ADMIN — HEPARIN SODIUM 5000 UNIT(S): 5000 INJECTION INTRAVENOUS; SUBCUTANEOUS at 13:16

## 2017-11-18 NOTE — PROGRESS NOTE ADULT - SUBJECTIVE AND OBJECTIVE BOX
_________________________________________________________________________________________  ========>>  M E D I C A L   A T T E N D I N G    F O L L O W  U P  N O T E  <<=========  -----------------------------------------------------------------------------------------------------    - Patient seen and examined by me approximately thirty minutes ago.  - In summary, patient is a 67y year old man who originally presented with fever and cough.  - Patient today overall doing ok, comfortable, eating OK.  overall improved, no blood tinged sputum, cough iproved    ==================>> MEDICATIONS <<====================    MEDICATIONS  (STANDING):  ALBUTerol/ipratropium for Nebulization 3 milliLiter(s) Nebulizer every 6 hours  amylase/lipase/protease  (CREON 12,000 Units) 2 Capsule(s) Oral three times a day with meals  aspirin  chewable 81 milliGRAM(s) Oral daily  cefTRIAXone   IVPB      diltiazem    milliGRAM(s) Oral daily  entecavir 0.5 milliGRAM(s) Oral daily  heparin  Injectable 5000 Unit(s) SubCutaneous every 8 hours  insulin lispro (HumaLOG) corrective regimen sliding scale   SubCutaneous Before meals and at bedtime  memantine 10 milliGRAM(s) Oral two times a day  metroNIDAZOLE  IVPB      metroNIDAZOLE  IVPB 500 milliGRAM(s) IV Intermittent every 8 hours  montelukast 10 milliGRAM(s) Oral daily  pantoprazole    Tablet 40 milliGRAM(s) Oral before breakfast  sertraline 25 milliGRAM(s) Oral daily  sodium chloride 0.9%. 1000 milliLiter(s) (75 mL/Hr) IV Continuous <Continuous>  sotalol 80 milliGRAM(s) Oral two times a day  sucralfate suspension 1 Gram(s) Oral two times a day    MEDICATIONS  (PRN):  acetaminophen    Suspension 650 milliGRAM(s) Oral every 6 hours PRN For Temp greater than 38 C (100.4 F)  aluminum hydroxide/magnesium hydroxide/simethicone Suspension 30 milliLiter(s) Oral every 6 hours PRN Dyspepsia    ==================>> REVIEW OF SYSTEM <<=================    GEN: no fever, no chills, no pain  RESP: no SOB, no cough, no sputum  CVS: no chest pain, no palpitations, no edema  GI: no abdominal pain, no nausea, no constipation, no diarrhea  : no dysuria, no frequency, no hematuria  Neuro: no headache, no dizziness  Derm : no itching, no rash    ==================>> VITAL SIGNS <<==================    T(C): 36.9 (17 @ 09:33), Max: 38.1 (17 @ 22:58)  HR: 86 (17 @ 04:48) (86 - 121)  BP: 103/70 (17 @ 04:48) (103/70 - 187/97)  RR: 16 (17 @ 04:48) (16 - 24)  SpO2: 98% (17 @ 04:48) (85% - 98%)    POCT Blood Glucose.: 125 mg/dL (2017 08:11)  POCT Blood Glucose.: 121 mg/dL (2017 22:10)    ==================>> PHYSICAL EXAM <<=================    GEN: A&O X 3 , NAD , comfortable, eating at bedside  HEENT: NCAT, PERRL, MMM, hearing intact  Neck: supple , no JVD  CVS: S1S2 , regular , No M/R/G appreciated  PULM: CTA B/L,  no W/R/R appreciated  ABD.: soft. non tender, non distended,  bowel sounds present  Extrem: intact pulses , no edema   PSYCH : normal mood,  not anxious     ==================>> LAB AND IMAGING <<==================                        11.0   14.4  )-----------( 110      ( 2017 06:43 )             34.2        WBC:  14.4    (17 @ 06:43)  WBC:  12.7    (17 @ 17:52)    Hemoglobin:   11.0  ( @ 06:43)   Hemoglobin:   14.2  ( @ 17:52)       145  |  112<H>  |  18  ----------------------------<  109<H>  3.8   |  23  |  1.31<H>    Ca    8.1<L>      2017 06:43  Phos  3.6       Mg     1.7         TPro  6.9  /  Alb  3.6  /  TBili  0.3  /  DBili  x   /  AST  18  /  ALT  28  /  AlkPhos  212<H>      Creatinine:  1.31   ( @ 06:43)  Creatinine:  1.33   ( @ 17:53)    PT/INR - ( 2017 17:52 )   PT: 13.9 sec;   INR: 1.27 ratio    PTT - ( 2017 17:52 )  PTT:20.0 sec          ABG - ( 2017 18:02 )    pH: 7.38  /  pCO2: 35    /  pO2: 45    / HCO3: 20    / Base Excess: -4.0  /  SaO2: 80         Urinalysis Basic - ( 2017 20:28 )  Color: Yellow / Appearance: Clear / S.010 / pH: x  Gluc: x / Ketone: Negative  / Bili: Negative / Urobili: Negative   Blood: x / Protein: 15 / Nitrite: Negative   Leuk Esterase: Negative / RBC: 0-2 /HPF / WBC 0-2 /HPF   Sq Epi: x / Non Sq Epi: Occasional /HPF / Bacteria: Trace /HPF    TSH:      0.70   (17)           Lipid profile:  (17)     Total: 69     LDL  : 21     HDL  :30     TG   :92     HgA1C: 6.3  (17)        , 6.2  (17)            < from: CT Angio Chest w/ IV Cont (17 @ 20:00) >  Impression: New trace left pleural effusion. Small pericardial effusion.    Mild cardiomegaly.    Mildly enlarged 1.1 cm right paratracheal lymph node. Mildly enlarged 1.4   cm left hilar lymph node.    Left greater than right airspace opacifications in both lungs which   nodular feature, progressed since the previous examination, suggestive of   pneumonia.     The appendix appears normal. No bowel obstruction or grossly thickened   bowel wall.    Trace ascites.    Stable chronic focal infrarenal type B abdominal aortic dissection with   calcification at the dissection flap.    Moderate stenosis at the proximal SMA.    < end of copied text >    ___________________________________________________________________________________  ===============>>  A S S E S S M E N T   A N D   P L A N <<===============  ------------------------------------------------------------------------------------------    · Assessment		  Patient is a 67y old  Male who presents with a chief complaint of Hemoptysis and chills after gastric procedure. Patient is admitted to the medicine floor for pneumonia likely from aspiration, hemoptysis likely from aspirated blood during GI procedure.      Problem/Plan - 1:  ·  Problem: PNA likely from aspiration, hemoptysis likely from aspirated blood during GI procedure, seems to have completely resolved  -Starting Rocephin + Metronidazole  -F/U blood culture  -Trend lactate, giving NS hydration. S/P 500cc NS in ED.  -O2 support with NC 2L. Monitor VS  -Will monitor patient clinically, if he develops another episode of hemoptysis, may need pulmonology consult later.   -monitor anemia    Problem/Plan - 2:  ·  Problem: WANDY due to dehydration vs new baseline  -NS IV hydration and PO hydration  - f/u Cr   -Avoid nephrotoxins.     Problem/Plan - 3:  ·  Problem: Gastric cancer.  -On chemotherapy q 2 weeks, s/p dilatation of gastric stricture yesterday  -Family spoke with gastroenterologist post procedure, allowed to start pureed diet.  -Continue carafate and PPI at home dose.     Problem/Plan - 4:  ·  Problem: Afib.  Plan:   -Continue Sotalol and cardizem at home dose, with aspirin  -No need for telemetry monitoring at this time  - pt not on AC (?due to gastric cancer). -No chest pain or palpitation. EKG stable.  -Continue Sotalol and cardizem at home dose, with aspirin  -No need for telemetry monitoring.   Problem/Plan - 5:  ·  Problem: Hepatitis B.    -stable LFT, will continue Entecavir at home dose.     Problem/Plan - 6:  Problem: Diabetes.    -Check Hba1c and monitor accucheck  -Hold home med metformin for 48hrs and Januvia, post IV contrast.  -Humalog sliding scale and consistent carb diet.    Problem/Plan - 7:  ·  Problem: History of hypertension.     -BP stable now, continue sotalol and cardizem at home dose with parameters.     Problem/Plan - 8:  ·  Problem: COPD -Not in acute exacerbation.   -Continue duoneb q 6hrs.  -Antibiotics for aspiration PNA.     -GI/DVT Prophylaxis.  - DC planing soon as able (pt asking to go home today!, but had fever last ngith)  --------------------------------------------  Case discussed with pt, son, staff  Education given on plan of care, supportive care  ___________________________  H. TAYO Kam.  Pager: [phone]

## 2017-11-19 ENCOUNTER — TRANSCRIPTION ENCOUNTER (OUTPATIENT)
Age: 67
End: 2017-11-19

## 2017-11-19 VITALS
TEMPERATURE: 99 F | SYSTOLIC BLOOD PRESSURE: 129 MMHG | RESPIRATION RATE: 17 BRPM | OXYGEN SATURATION: 98 % | HEART RATE: 98 BPM | DIASTOLIC BLOOD PRESSURE: 91 MMHG

## 2017-11-19 LAB
ANION GAP SERPL CALC-SCNC: 9 MMOL/L — SIGNIFICANT CHANGE UP (ref 5–17)
BASOPHILS # BLD AUTO: 0 K/UL — SIGNIFICANT CHANGE UP (ref 0–0.2)
BASOPHILS NFR BLD AUTO: 0.4 % — SIGNIFICANT CHANGE UP (ref 0–2)
BUN SERPL-MCNC: 12 MG/DL — SIGNIFICANT CHANGE UP (ref 7–18)
CALCIUM SERPL-MCNC: 8.4 MG/DL — SIGNIFICANT CHANGE UP (ref 8.4–10.5)
CHLORIDE SERPL-SCNC: 110 MMOL/L — HIGH (ref 96–108)
CO2 SERPL-SCNC: 25 MMOL/L — SIGNIFICANT CHANGE UP (ref 22–31)
CREAT SERPL-MCNC: 1.08 MG/DL — SIGNIFICANT CHANGE UP (ref 0.5–1.3)
EOSINOPHIL # BLD AUTO: 0 K/UL — SIGNIFICANT CHANGE UP (ref 0–0.5)
EOSINOPHIL NFR BLD AUTO: 0.4 % — SIGNIFICANT CHANGE UP (ref 0–6)
GLUCOSE BLDC GLUCOMTR-MCNC: 133 MG/DL — HIGH (ref 70–99)
GLUCOSE BLDC GLUCOMTR-MCNC: 92 MG/DL — SIGNIFICANT CHANGE UP (ref 70–99)
GLUCOSE SERPL-MCNC: 83 MG/DL — SIGNIFICANT CHANGE UP (ref 70–99)
HCT VFR BLD CALC: 32.1 % — LOW (ref 39–50)
HGB BLD-MCNC: 10.8 G/DL — LOW (ref 13–17)
LYMPHOCYTES # BLD AUTO: 0.8 K/UL — LOW (ref 1–3.3)
LYMPHOCYTES # BLD AUTO: 7.9 % — LOW (ref 13–44)
MAGNESIUM SERPL-MCNC: 2 MG/DL — SIGNIFICANT CHANGE UP (ref 1.6–2.6)
MCHC RBC-ENTMCNC: 29.7 PG — SIGNIFICANT CHANGE UP (ref 27–34)
MCHC RBC-ENTMCNC: 33.8 GM/DL — SIGNIFICANT CHANGE UP (ref 32–36)
MCV RBC AUTO: 88 FL — SIGNIFICANT CHANGE UP (ref 80–100)
MONOCYTES # BLD AUTO: 0.5 K/UL — SIGNIFICANT CHANGE UP (ref 0–0.9)
MONOCYTES NFR BLD AUTO: 4.7 % — SIGNIFICANT CHANGE UP (ref 2–14)
NEUTROPHILS # BLD AUTO: 8.4 K/UL — HIGH (ref 1.8–7.4)
NEUTROPHILS NFR BLD AUTO: 86.6 % — HIGH (ref 43–77)
PHOSPHATE SERPL-MCNC: 2.1 MG/DL — LOW (ref 2.5–4.5)
PLATELET # BLD AUTO: 91 K/UL — LOW (ref 150–400)
POTASSIUM SERPL-MCNC: 3.6 MMOL/L — SIGNIFICANT CHANGE UP (ref 3.5–5.3)
POTASSIUM SERPL-SCNC: 3.6 MMOL/L — SIGNIFICANT CHANGE UP (ref 3.5–5.3)
RBC # BLD: 3.64 M/UL — LOW (ref 4.2–5.8)
RBC # FLD: 15.4 % — HIGH (ref 10.3–14.5)
SODIUM SERPL-SCNC: 144 MMOL/L — SIGNIFICANT CHANGE UP (ref 135–145)
WBC # BLD: 9.7 K/UL — SIGNIFICANT CHANGE UP (ref 3.8–10.5)
WBC # FLD AUTO: 9.7 K/UL — SIGNIFICANT CHANGE UP (ref 3.8–10.5)

## 2017-11-19 RX ORDER — METRONIDAZOLE 500 MG
1 TABLET ORAL
Qty: 18 | Refills: 0 | OUTPATIENT
Start: 2017-11-19 | End: 2017-11-25

## 2017-11-19 RX ADMIN — PANTOPRAZOLE SODIUM 40 MILLIGRAM(S): 20 TABLET, DELAYED RELEASE ORAL at 05:49

## 2017-11-19 RX ADMIN — Medication 100 MILLIGRAM(S): at 05:50

## 2017-11-19 RX ADMIN — Medication 81 MILLIGRAM(S): at 10:18

## 2017-11-19 RX ADMIN — Medication 80 MILLIGRAM(S): at 10:14

## 2017-11-19 RX ADMIN — CEFTRIAXONE 100 GRAM(S): 500 INJECTION, POWDER, FOR SOLUTION INTRAMUSCULAR; INTRAVENOUS at 06:51

## 2017-11-19 NOTE — DISCHARGE NOTE ADULT - CARE PROVIDER_API CALL
Jessica Fried), Internal Medicine  [address] [place] [address]  Phone: [phone]  Fax: [phone]

## 2017-11-19 NOTE — DISCHARGE NOTE ADULT - PLAN OF CARE
clear infection Please follow up with your primary care doctor within one and continue with Augmentin 1 tab, two time a days and Flagyl 1 tab, 3 times a day for 6 more days. Please follow up with your primary care doctor within one and continue with home medications.

## 2017-11-19 NOTE — DISCHARGE NOTE ADULT - SECONDARY DIAGNOSIS.
COPD (chronic obstructive pulmonary disease) Diabetes Hepatitis B Afib Gastric cancer History of hypertension

## 2017-11-19 NOTE — PROGRESS NOTE ADULT - SUBJECTIVE AND OBJECTIVE BOX
M E D I C A L   A T T E N D I N G    F O L L O W    U P   N O T E                                     ------------------------------------------------------------------------------------------------    patient evaluated by me, case discussed with team, chart, medications, and physical exam reviewed, labs / tests  and vitals reviewed by me, as bellow.   Patient is stable for discharge today.  Pt feels overall much better and wants to go home (since yesterday)  Patient to follow up with  PMD and oncologist as outpatient. finish 7 days abx : switch to Augmentin + flagyl  See discharge document for full note.    ___________________________________________________  =========MEDS=========  ALBUTerol/ipratropium for Nebulization 3 milliLiter(s) Nebulizer every 6 hours  aspirin  chewable 81 milliGRAM(s) Oral daily  cefTRIAXone   IVPB 1 Gram(s) IV Intermittent every 24 hours  cefTRIAXone   IVPB      diltiazem    milliGRAM(s) Oral at bedtime  entecavir 0.5 milliGRAM(s) Oral daily  heparin  Injectable 5000 Unit(s) SubCutaneous every 8 hours  insulin lispro (HumaLOG) corrective regimen sliding scale   SubCutaneous Before meals and at bedtime  memantine 10 milliGRAM(s) Oral every 12 hours  metroNIDAZOLE  IVPB 500 milliGRAM(s) IV Intermittent every 8 hours  metroNIDAZOLE  IVPB      montelukast 10 milliGRAM(s) Oral daily  pantoprazole    Tablet 40 milliGRAM(s) Oral before breakfast  sertraline 25 milliGRAM(s) Oral at bedtime  sodium chloride 0.9%. 1000 milliLiter(s) IV Continuous <Continuous>  sotalol 80 milliGRAM(s) Oral every 12 hours  sucralfate suspension 1 Gram(s) Oral two times a day    MEDICATIONS  (PRN):  acetaminophen    Suspension 650 milliGRAM(s) Oral every 6 hours PRN For Temp greater than 38 C (100.4 F)  aluminum hydroxide/magnesium hydroxide/simethicone Suspension 30 milliLiter(s) Oral every 6 hours PRN Dyspepsia    ___________________________________________________  ___________________________________________________    T(C): 37.1 (17 @ 05:20), Max: 37.7 (17 @ 20:56)  HR: 111 (17 @ 05:20) (103 - 113)  BP: 118/77 (17 @ 05:20) (105/63 - 134/81)  RR: 17 (17 @ 05:20) (16 - 17)  SpO2: 93% (17 @ 05:20) (93% - 96%)    POCT Blood Glucose.: 133 mg/dL (2017 11:04)  POCT Blood Glucose.: 92 mg/dL (2017 07:30)  POCT Blood Glucose.: 94 mg/dL (2017 21:13)  POCT Blood Glucose.: 84 mg/dL (2017 16:38)    ___________________________________________________  __________________________________________________                          10.8   9.7   )-----------( 91       ( 2017 08:46 )             32.1            144  |  110<H>  |  12  ----------------------------<  83  3.6   |  25  |  1.08    Ca    8.4      2017 08:46  Phos  2.1       Mg     2.0         TPro  6.9  /  Alb  3.6  /  TBili  0.3  /  DBili  x   /  AST  18  /  ALT  28  /  AlkPhos  212<H>      Creatinine:  1.08   ( @ 08:46)  Creatinine:  1.31   ( @ 06:43)  Creatinine:  1.33   ( @ 17:53)    PT/INR - ( 2017 17:52 )   PT: 13.9 sec;   INR: 1.27 ratio    PTT - ( 2017 17:52 )  PTT:20.0 sec          ABG - ( 2017 18:02 )    pH: 7.38  /  pCO2: 35    /  pO2: 45    / HCO3: 20    / Base Excess: -4.0  /  SaO2: 80         Urinalysis Basic - ( 2017 20:28 )  Color: Yellow / Appearance: Clear / S.010 / pH: x  Gluc: x / Ketone: Negative  / Bili: Negative / Urobili: Negative   Blood: x / Protein: 15 / Nitrite: Negative   Leuk Esterase: Negative / RBC: 0-2 /HPF / WBC 0-2 /HPF   Sq Epi: x / Non Sq Epi: Occasional /HPF / Bacteria: Trace /HPF    TSH:      0.70   (17)           Lipid profile:  (17)     Total: 69     LDL  : 21     HDL  :30     TG   :92     HgA1C: 6.3  (17)        , 6.2  (17)

## 2017-11-19 NOTE — DISCHARGE NOTE ADULT - PATIENT PORTAL LINK FT
“You can access the FollowHealth Patient Portal, offered by [facility], by registering with the following website: [email or web address]”

## 2017-11-19 NOTE — DISCHARGE NOTE ADULT - HOSPITAL COURSE
Patient is a 67 year old  Male who with PMH of hypertension, diabetes, A-fib(not on AC), COPD, Hep B(On Entecavir), dyslipidemia, BPH, Gastric Ca (s/p Rt IJ mediport, currently on chemotherapy since 9/2017, oxaliplatin and other med, last chemo 11/9/17), Mild dementia, h/o PNA, and PSHx of Embolic Filter Insertion(to the heart), Tonsillectomy, exploratory laparotomy, proximal gastrectomy(9/2017), feeding jejunostomy and reversal, umbilical hernia repair and liver biopsy presented with a chief complaint of Hemoptysis and chills after gastric procedure. Patient is admitted to the medicine floor for pneumonia likely from aspiration, hemoptysis likely from aspirated blood during GI procedure.Pt was admitted as  PNA (pneumonia). Pneumonia likely from aspiration, hemoptysis likely from aspirated blood during GI procedure.Starting Rocephin + Metronidazole. Patient  will continue with Augment and Flagyl for 6 more days and follow up with his primary care doctor within one week. Pt also had WANDY on admission, give IV fluid, WANDY resolved now. Pt was seen and examined at bedside, medically ready to discharge to home today. Discussed with  about the discharge plan and he agreed. Patient is a 67 year old  Male who with PMH of hypertension, diabetes, A-fib(not on AC), COPD, Hep B(On Entecavir), dyslipidemia, BPH, Gastric Ca (s/p Rt IJ mediport, currently on chemotherapy since 9/2017, oxaliplatin and other med, last chemo 11/9/17), Mild dementia, h/o PNA, and PSHx of Embolic Filter Insertion(to the heart), Tonsillectomy, exploratory laparotomy, proximal gastrectomy(9/2017), feeding jejunostomy and reversal, umbilical hernia repair and liver biopsy presented with a chief complaint of Hemoptysis and chills after gastric procedure. Patient is admitted to the medicine floor for pneumonia likely from aspiration, hemoptysis likely from aspirated blood during GI procedure.  Pt was admitted as  PNA (pneumonia). Pneumonia likely from aspiration, hemoptysis likely from aspirated blood during GI procedure.Starting Rocephin + Metronidazole. Patient  will continue with Augment and Flagyl for 6 more days and follow up with his primary care doctor within one week. Pt also had WANDY on admission, give IV fluid, WANDY resolved now. Pt was seen and examined at bedside, medically ready to discharge to home today. Discussed with  about the discharge plan and he agreed.

## 2017-11-19 NOTE — DISCHARGE NOTE ADULT - MEDICATION SUMMARY - MEDICATIONS TO TAKE
I will START or STAY ON the medications listed below when I get home from the hospital:    Flagyl 500 mg oral tablet  -- 1 tab(s) by mouth 3 times a day   -- Do not drink alcoholic beverages when taking this medication.  Finish all this medication unless otherwise directed by prescriber.  May discolor urine or feces.    -- Indication: For PNA (pneumonia)    aspirin 81 mg oral delayed release capsule  -- 1 tab(s) by mouth once a day in am  -- Indication: For  prophylactic measure    DILTIAZEM 24HR  MG CAP  -- Indication: For Afib    SOTALOL 80 MG TABLET  -- orally 2 times a day  -- Indication: For Afib    sertraline 50 mg oral tablet  -- 0.5 tab(s) by mouth once a day (at bedtime)  -- Indication: For Depression    METFORMIN  MG TABLET  -- Indication: For Diabetes    Januvia 100 mg oral tablet  -- 1 tab(s) by mouth once a day in pm  -- Indication: For Diabetes    ENTECAVIR 0.5 MG TABLET  -- Indication: For Hepatitis B    SYMBICORT 160-4.5 MCG INHALER  -- Indication: For COPD (chronic obstructive pulmonary disease)    SPIRIVA 18 MCG CP-HANDIHALER  -- Indication: For COPD (chronic obstructive pulmonary disease)    Creon 24,000 units oral delayed release capsule  -- 1 cap(s) by mouth 2 times a day  -- Indication: For Disgestive enzyme    MONTELUKAST SOD 10 MG TABLET  -- Indication: For COPD (chronic obstructive pulmonary disease)    Namenda 10 mg oral tablet  -- 1 tab(s) by mouth 2 times a day  -- Indication: For Need for prophylactic measure    CARAFATE 1 GM/10 ML SUSP  -- orally 2 times a day  -- Indication: For History of gastrectomy    RESTASIS 0.05% EYE EMULSION  -- Indication: For eye drop    Augmentin 500 mg-125 mg oral tablet  -- 1 tab(s) by mouth 2 times a day   -- Finish all this medication unless otherwise directed by prescriber.  Take with food or milk.    -- Indication: For Pneumonia    DEXILANT DR 60 MG CAPSULE  -- Indication: For History of gastrectomy

## 2017-11-19 NOTE — DISCHARGE NOTE ADULT - CARE PLAN
Principal Discharge DX:	PNA (pneumonia)  Goal:	clear infection  Instructions for follow-up, activity and diet:	Please follow up with your primary care doctor within one and continue with Augmentin 1 tab, two time a days and Flagyl 1 tab, 3 times a day for 6 more days.  Secondary Diagnosis:	COPD (chronic obstructive pulmonary disease)  Instructions for follow-up, activity and diet:	Please follow up with your primary care doctor within one and continue with home medications.  Secondary Diagnosis:	Diabetes  Instructions for follow-up, activity and diet:	Please follow up with your primary care doctor within one and continue with home medications.  Secondary Diagnosis:	Hepatitis B  Instructions for follow-up, activity and diet:	Please follow up with your primary care doctor within one and continue with home medications.  Secondary Diagnosis:	Afib  Instructions for follow-up, activity and diet:	Please follow up with your primary care doctor within one and continue with home medications.  Secondary Diagnosis:	Gastric cancer  Instructions for follow-up, activity and diet:	Please follow up with your primary care doctor within one and continue with home medications.  Secondary Diagnosis:	History of hypertension  Instructions for follow-up, activity and diet:	Please follow up with your primary care doctor within one and continue with home medications.

## 2017-11-23 LAB
CULTURE RESULTS: SIGNIFICANT CHANGE UP
CULTURE RESULTS: SIGNIFICANT CHANGE UP
SPECIMEN SOURCE: SIGNIFICANT CHANGE UP
SPECIMEN SOURCE: SIGNIFICANT CHANGE UP

## 2017-12-18 ENCOUNTER — APPOINTMENT (OUTPATIENT)
Dept: SURGICAL ONCOLOGY | Facility: CLINIC | Age: 67
End: 2017-12-18
Payer: MEDICARE

## 2017-12-18 VITALS
SYSTOLIC BLOOD PRESSURE: 102 MMHG | HEART RATE: 110 BPM | DIASTOLIC BLOOD PRESSURE: 68 MMHG | HEIGHT: 67 IN | BODY MASS INDEX: 26.06 KG/M2 | WEIGHT: 166 LBS

## 2017-12-18 PROCEDURE — 99215 OFFICE O/P EST HI 40 MIN: CPT

## 2017-12-18 RX ORDER — SUCRALFATE 1 G/10ML
1 SUSPENSION ORAL 4 TIMES DAILY
Qty: 1 | Refills: 5 | Status: ACTIVE | COMMUNITY
Start: 2017-12-18 | End: 1900-01-01

## 2017-12-19 PROCEDURE — 71275 CT ANGIOGRAPHY CHEST: CPT

## 2017-12-19 PROCEDURE — 84100 ASSAY OF PHOSPHORUS: CPT

## 2017-12-19 PROCEDURE — 83605 ASSAY OF LACTIC ACID: CPT

## 2017-12-19 PROCEDURE — 80053 COMPREHEN METABOLIC PANEL: CPT

## 2017-12-19 PROCEDURE — 86901 BLOOD TYPING SEROLOGIC RH(D): CPT

## 2017-12-19 PROCEDURE — 82746 ASSAY OF FOLIC ACID SERUM: CPT

## 2017-12-19 PROCEDURE — 99285 EMERGENCY DEPT VISIT HI MDM: CPT | Mod: 25

## 2017-12-19 PROCEDURE — 96375 TX/PRO/DX INJ NEW DRUG ADDON: CPT

## 2017-12-19 PROCEDURE — 80061 LIPID PANEL: CPT

## 2017-12-19 PROCEDURE — 71045 X-RAY EXAM CHEST 1 VIEW: CPT

## 2017-12-19 PROCEDURE — 83735 ASSAY OF MAGNESIUM: CPT

## 2017-12-19 PROCEDURE — 81001 URINALYSIS AUTO W/SCOPE: CPT

## 2017-12-19 PROCEDURE — 84443 ASSAY THYROID STIM HORMONE: CPT

## 2017-12-19 PROCEDURE — 82962 GLUCOSE BLOOD TEST: CPT

## 2017-12-19 PROCEDURE — 94640 AIRWAY INHALATION TREATMENT: CPT

## 2017-12-19 PROCEDURE — 80048 BASIC METABOLIC PNL TOTAL CA: CPT

## 2017-12-19 PROCEDURE — 74177 CT ABD & PELVIS W/CONTRAST: CPT

## 2017-12-19 PROCEDURE — 87040 BLOOD CULTURE FOR BACTERIA: CPT

## 2017-12-19 PROCEDURE — 86850 RBC ANTIBODY SCREEN: CPT

## 2017-12-19 PROCEDURE — 82607 VITAMIN B-12: CPT

## 2017-12-19 PROCEDURE — 82803 BLOOD GASES ANY COMBINATION: CPT

## 2017-12-19 PROCEDURE — 86900 BLOOD TYPING SEROLOGIC ABO: CPT

## 2017-12-19 PROCEDURE — 85027 COMPLETE CBC AUTOMATED: CPT

## 2017-12-19 PROCEDURE — 85610 PROTHROMBIN TIME: CPT

## 2017-12-19 PROCEDURE — 86803 HEPATITIS C AB TEST: CPT

## 2017-12-19 PROCEDURE — 96374 THER/PROPH/DIAG INJ IV PUSH: CPT | Mod: XU

## 2017-12-19 PROCEDURE — 85730 THROMBOPLASTIN TIME PARTIAL: CPT

## 2017-12-19 PROCEDURE — 93005 ELECTROCARDIOGRAM TRACING: CPT

## 2018-01-09 ENCOUNTER — EMERGENCY (EMERGENCY)
Facility: HOSPITAL | Age: 68
LOS: 1 days | Discharge: HOME | End: 2018-01-09
Attending: EMERGENCY MEDICINE | Admitting: EMERGENCY MEDICINE
Payer: MEDICARE

## 2018-01-09 VITALS
SYSTOLIC BLOOD PRESSURE: 129 MMHG | DIASTOLIC BLOOD PRESSURE: 81 MMHG | HEART RATE: 98 BPM | OXYGEN SATURATION: 100 % | TEMPERATURE: 99 F | RESPIRATION RATE: 18 BRPM

## 2018-01-09 DIAGNOSIS — Z90.89 ACQUIRED ABSENCE OF OTHER ORGANS: Chronic | ICD-10-CM

## 2018-01-09 DIAGNOSIS — Z98.890 OTHER SPECIFIED POSTPROCEDURAL STATES: Chronic | ICD-10-CM

## 2018-01-09 DIAGNOSIS — Z90.3 ACQUIRED ABSENCE OF STOMACH [PART OF]: Chronic | ICD-10-CM

## 2018-01-09 LAB
ALBUMIN SERPL ELPH-MCNC: 3.7 G/DL — SIGNIFICANT CHANGE UP (ref 3.3–5)
ALP SERPL-CCNC: 137 U/L — HIGH (ref 40–120)
ALT FLD-CCNC: 42 U/L — HIGH (ref 4–41)
APPEARANCE UR: CLEAR — SIGNIFICANT CHANGE UP
AST SERPL-CCNC: 37 U/L — SIGNIFICANT CHANGE UP (ref 4–40)
BACTERIA # UR AUTO: SIGNIFICANT CHANGE UP
BASE EXCESS BLDV CALC-SCNC: 3.4 MMOL/L — SIGNIFICANT CHANGE UP
BASOPHILS # BLD AUTO: 0.04 K/UL — SIGNIFICANT CHANGE UP (ref 0–0.2)
BASOPHILS NFR BLD AUTO: 0.9 % — SIGNIFICANT CHANGE UP (ref 0–2)
BILIRUB SERPL-MCNC: 0.4 MG/DL — SIGNIFICANT CHANGE UP (ref 0.2–1.2)
BILIRUB UR-MCNC: NEGATIVE — SIGNIFICANT CHANGE UP
BLOOD GAS VENOUS - CREATININE: 0.71 MG/DL — SIGNIFICANT CHANGE UP (ref 0.5–1.3)
BLOOD UR QL VISUAL: NEGATIVE — SIGNIFICANT CHANGE UP
BUN SERPL-MCNC: 15 MG/DL — SIGNIFICANT CHANGE UP (ref 7–23)
CALCIUM SERPL-MCNC: 8.5 MG/DL — SIGNIFICANT CHANGE UP (ref 8.4–10.5)
CHLORIDE BLDV-SCNC: 109 MMOL/L — HIGH (ref 96–108)
CHLORIDE SERPL-SCNC: 106 MMOL/L — SIGNIFICANT CHANGE UP (ref 98–107)
CO2 SERPL-SCNC: 26 MMOL/L — SIGNIFICANT CHANGE UP (ref 22–31)
COLOR SPEC: YELLOW — SIGNIFICANT CHANGE UP
CREAT SERPL-MCNC: 0.81 MG/DL — SIGNIFICANT CHANGE UP (ref 0.5–1.3)
EOSINOPHIL # BLD AUTO: 0.03 K/UL — SIGNIFICANT CHANGE UP (ref 0–0.5)
EOSINOPHIL NFR BLD AUTO: 0.7 % — SIGNIFICANT CHANGE UP (ref 0–6)
GAS PNL BLDV: 138 MMOL/L — SIGNIFICANT CHANGE UP (ref 136–146)
GLUCOSE BLDV-MCNC: 81 — SIGNIFICANT CHANGE UP (ref 70–99)
GLUCOSE SERPL-MCNC: 83 MG/DL — SIGNIFICANT CHANGE UP (ref 70–99)
GLUCOSE UR-MCNC: 250 — HIGH
HCO3 BLDV-SCNC: 27 MMOL/L — SIGNIFICANT CHANGE UP (ref 20–27)
HCT VFR BLD CALC: 33.6 % — LOW (ref 39–50)
HCT VFR BLDV CALC: 34.8 % — LOW (ref 39–51)
HGB BLD-MCNC: 11 G/DL — LOW (ref 13–17)
HGB BLDV-MCNC: 11.3 G/DL — LOW (ref 13–17)
IMM GRANULOCYTES # BLD AUTO: 0.01 # — SIGNIFICANT CHANGE UP
IMM GRANULOCYTES NFR BLD AUTO: 0.2 % — SIGNIFICANT CHANGE UP (ref 0–1.5)
KETONES UR-MCNC: NEGATIVE — SIGNIFICANT CHANGE UP
LACTATE BLDV-MCNC: 1 MMOL/L — SIGNIFICANT CHANGE UP (ref 0.5–2)
LEUKOCYTE ESTERASE UR-ACNC: NEGATIVE — SIGNIFICANT CHANGE UP
LYMPHOCYTES # BLD AUTO: 0.89 K/UL — LOW (ref 1–3.3)
LYMPHOCYTES # BLD AUTO: 19.3 % — SIGNIFICANT CHANGE UP (ref 13–44)
MCHC RBC-ENTMCNC: 30 PG — SIGNIFICANT CHANGE UP (ref 27–34)
MCHC RBC-ENTMCNC: 32.7 % — SIGNIFICANT CHANGE UP (ref 32–36)
MCV RBC AUTO: 91.6 FL — SIGNIFICANT CHANGE UP (ref 80–100)
MONOCYTES # BLD AUTO: 0.37 K/UL — SIGNIFICANT CHANGE UP (ref 0–0.9)
MONOCYTES NFR BLD AUTO: 8 % — SIGNIFICANT CHANGE UP (ref 2–14)
MUCOUS THREADS # UR AUTO: SIGNIFICANT CHANGE UP
NEUTROPHILS # BLD AUTO: 3.27 K/UL — SIGNIFICANT CHANGE UP (ref 1.8–7.4)
NEUTROPHILS NFR BLD AUTO: 70.9 % — SIGNIFICANT CHANGE UP (ref 43–77)
NITRITE UR-MCNC: NEGATIVE — SIGNIFICANT CHANGE UP
NRBC # FLD: 0 — SIGNIFICANT CHANGE UP
PCO2 BLDV: 44 MMHG — SIGNIFICANT CHANGE UP (ref 41–51)
PH BLDV: 7.42 PH — SIGNIFICANT CHANGE UP (ref 7.32–7.43)
PH UR: 6.5 — SIGNIFICANT CHANGE UP (ref 4.6–8)
PLATELET # BLD AUTO: 117 K/UL — LOW (ref 150–400)
PMV BLD: 12.3 FL — SIGNIFICANT CHANGE UP (ref 7–13)
PO2 BLDV: 55 MMHG — HIGH (ref 35–40)
POTASSIUM BLDV-SCNC: 3.4 MMOL/L — SIGNIFICANT CHANGE UP (ref 3.4–4.5)
POTASSIUM SERPL-MCNC: 3.7 MMOL/L — SIGNIFICANT CHANGE UP (ref 3.5–5.3)
POTASSIUM SERPL-SCNC: 3.7 MMOL/L — SIGNIFICANT CHANGE UP (ref 3.5–5.3)
PROT SERPL-MCNC: 6 G/DL — SIGNIFICANT CHANGE UP (ref 6–8.3)
PROT UR-MCNC: 30 MG/DL — HIGH
RBC # BLD: 3.67 M/UL — LOW (ref 4.2–5.8)
RBC # FLD: 17.2 % — HIGH (ref 10.3–14.5)
RBC CASTS # UR COMP ASSIST: SIGNIFICANT CHANGE UP (ref 0–?)
SAO2 % BLDV: 88.2 % — HIGH (ref 60–85)
SODIUM SERPL-SCNC: 143 MMOL/L — SIGNIFICANT CHANGE UP (ref 135–145)
SP GR SPEC: 1.02 — SIGNIFICANT CHANGE UP (ref 1–1.04)
SQUAMOUS # UR AUTO: SIGNIFICANT CHANGE UP
UROBILINOGEN FLD QL: NORMAL MG/DL — SIGNIFICANT CHANGE UP
WBC # BLD: 4.61 K/UL — SIGNIFICANT CHANGE UP (ref 3.8–10.5)
WBC # FLD AUTO: 4.61 K/UL — SIGNIFICANT CHANGE UP (ref 3.8–10.5)
WBC UR QL: SIGNIFICANT CHANGE UP (ref 0–?)

## 2018-01-09 PROCEDURE — 99285 EMERGENCY DEPT VISIT HI MDM: CPT

## 2018-01-09 PROCEDURE — 71046 X-RAY EXAM CHEST 2 VIEWS: CPT | Mod: 26

## 2018-01-09 PROCEDURE — 70450 CT HEAD/BRAIN W/O DYE: CPT | Mod: 26

## 2018-01-09 RX ORDER — SODIUM CHLORIDE 9 MG/ML
1000 INJECTION INTRAMUSCULAR; INTRAVENOUS; SUBCUTANEOUS ONCE
Qty: 0 | Refills: 0 | Status: COMPLETED | OUTPATIENT
Start: 2018-01-09 | End: 2018-01-09

## 2018-01-09 NOTE — ED ADULT NURSE NOTE - OBJECTIVE STATEMENT
Received pt in spot 12. [id number]. Pt primarily Singaporean speaking with daughter at bedside for translation. States he has had two recent falls, last one was today. Denies hitting head or LOC. C/o persistent dizziness. Denies any pain. H/o stomach CA, last chemo 3 weeks ago, states is due for chemo tomorrow. VS as noted. Afib 90s-110s on cardiac monitor. MD hurtado in progress. Will continue to monitor.

## 2018-01-09 NOTE — ED PROVIDER NOTE - MEDICAL DECISION MAKING DETAILS
68 yo man w/ dizzy sensation. Normal examination. Low suspicion for central stroke given lack of findings on neuro examination. Will check labs, cxr, ua, ct head, hydrate. 66 yo man w/ dizzy sensation. Normal complete neuro examination and no other physical findigns. Low suspicion for central given lack of findings on neuro examination and characteristics of sx. Will check labs, cxr, ua/uc, ct head, hydrate.  reassess.  Is eager to go home since he is scheduled for chemo tomorrow.

## 2018-01-09 NOTE — ED PROVIDER NOTE - PROGRESS NOTE DETAILS
Main Pollock MD PGY4: Labs, imaging reviewed. Symptoms improved w/ ivf. Will discharge with instructions for outpatient follow-up.

## 2018-01-09 NOTE — ED PROVIDER NOTE - CARE PLAN
Principal Discharge DX:	Dizziness  Instructions for follow-up, activity and diet:	Call your primary care doctor today or tomorrow to schedule follow up appointment for within the next 3-5 days.  Continue taking your medications as prescribed.  Return to this Emergency Department if you experience worsening condition or for any other emergency.

## 2018-01-09 NOTE — ED ADULT NURSE NOTE - CHIEF COMPLAINT QUOTE
Brought in by EMS from home for generalized weakness.   Appears comfortable and in no apparent distress.  Patient experienced 2 episode of near syncopal episodes.  Complaining of generalized weakness.  History of stomach CA and current chemo tx every 3 weeks.  Denies chest pain, SOB or dizziness.  Right hand 20g IV placed by EMS.

## 2018-01-09 NOTE — ED PROVIDER NOTE - OBJECTIVE STATEMENT
Patient is Peruvian speaking. Patient declined  services and requested daughter translate at bedside.   68 yo man w/ extensive pmh as below including afib (s/p watchman device), gastric ca (s/p resection, on chemotherapy last 3 wks ago) p/w dizzy sensation. States for past several days has felt dizzy sensation, room spinning, w/o clear aggravating factors, intermittent. Has had a couple of minor falls during this time w/o head or other injury. Was instructed by his oncologist to come to ED today for evaluation. Otherwise, denies fever, abd pain, vomiting, cough, urinary symptoms, weakness, sensory deficits.  PMD Jessica Samaniego  [facility] [place] 68 yo man w/ extensive pmh as below including afib (s/p watchman device), gastric ca (s/p resection, on chemotherapy last dose was 3 wks ago) p/w dizzy sensation. States for past several days has felt dizzy sensation, room spinning, w/o clear aggravating factors, intermittent, primarily when he lays down and closes eyes, not when walking. Has had a couple of minor falls during this time w/o head or other injury. Was instructed by his oncologist to come to ED today for evaluation. Otherwise, denies fever, abd pain, vomiting, cough, urinary symptoms, weakness, sensory deficits.  Appears well at present.  NAD.  No pain.  No fever/chills. Normal PO intake.     PMD Jessica Samaniego  [facility] [place]    Patient is LEP, primarily Mexican speaking. Patient declined  services, speaking in English, and requested daughter translate at bedside.

## 2018-01-10 VITALS — TEMPERATURE: 98 F

## 2018-01-10 RX ADMIN — SODIUM CHLORIDE 1000 MILLILITER(S): 9 INJECTION INTRAMUSCULAR; INTRAVENOUS; SUBCUTANEOUS at 00:11

## 2018-01-22 ENCOUNTER — APPOINTMENT (OUTPATIENT)
Dept: SURGICAL ONCOLOGY | Facility: CLINIC | Age: 68
End: 2018-01-22
Payer: MEDICARE

## 2018-01-22 VITALS
HEIGHT: 67 IN | DIASTOLIC BLOOD PRESSURE: 95 MMHG | HEART RATE: 101 BPM | WEIGHT: 169 LBS | SYSTOLIC BLOOD PRESSURE: 136 MMHG | BODY MASS INDEX: 26.53 KG/M2 | TEMPERATURE: 99 F | OXYGEN SATURATION: 97 %

## 2018-01-22 PROCEDURE — 99214 OFFICE O/P EST MOD 30 MIN: CPT

## 2018-06-01 NOTE — ED ADULT NURSE NOTE - CAS EDP DISCH DISPOSITION ADMI
Noted. I agree with the assessment and treatment plan as outlined by the clinical staff.   Telemetry

## 2018-10-09 NOTE — PATIENT PROFILE ADULT. - NS PRO AD ANY ON CHART
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 No    Does the patient have ICM or CTT? No    Taking injectable psychiatric medications? NoIf yes, patient can not be seen here  Has the patient ever seen or currently see a psychiatrist? No If yes who/when? Has the patient ever seen or currently see a therapist? Yes If yes who/when? EVALUATED BY PSYCHOLOGIST,ON WAIT LIST FOR IU-20    How many visits did the pt have for previous psychiatric treatment?  History    Has the patient served in the [place] [address]? No    If yes, have you had combat services? No    Was the patient activated into federal active duty as a member of the national guard or reserve? No    Minor Child    Who has custody of the child? Is there a custody agreement? 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 No outpatient    If seen as outpatient, what provider(s) did the patient see? N/A    A member of the patient's family has been in therapy here with NO    ACCEPTED as a patient Yes Appointment Date: 4/11/2019 @ 9:00AM DR FIONA WALDROP    Referred Elsewhere?  No    Primary Care Physician: Isaiah Edwards MD    PCP telephone number: [phone]    SUB: L[facility] [place]  INS: [address]  ID: [id number]  TEETEE KIM  ID: [id number]
no

## 2018-10-21 ENCOUNTER — INPATIENT (INPATIENT)
Facility: HOSPITAL | Age: 68
LOS: 1 days | Discharge: HOME | End: 2018-10-23
Attending: INTERNAL MEDICINE | Admitting: INTERNAL MEDICINE
Payer: MEDICARE

## 2018-10-21 VITALS
RESPIRATION RATE: 14 BRPM | OXYGEN SATURATION: 100 % | HEART RATE: 92 BPM | TEMPERATURE: 98 F | DIASTOLIC BLOOD PRESSURE: 55 MMHG | SYSTOLIC BLOOD PRESSURE: 98 MMHG

## 2018-10-21 DIAGNOSIS — Z98.890 OTHER SPECIFIED POSTPROCEDURAL STATES: Chronic | ICD-10-CM

## 2018-10-21 DIAGNOSIS — Z90.89 ACQUIRED ABSENCE OF OTHER ORGANS: Chronic | ICD-10-CM

## 2018-10-21 DIAGNOSIS — Z90.3 ACQUIRED ABSENCE OF STOMACH [PART OF]: Chronic | ICD-10-CM

## 2018-10-21 LAB
ALBUMIN SERPL ELPH-MCNC: 3.8 G/DL — SIGNIFICANT CHANGE UP (ref 3.3–5)
ALP SERPL-CCNC: 132 U/L — HIGH (ref 40–120)
ALT FLD-CCNC: 21 U/L — SIGNIFICANT CHANGE UP (ref 4–41)
ANISOCYTOSIS BLD QL: SLIGHT — SIGNIFICANT CHANGE UP
APTT BLD: 27.4 SEC — LOW (ref 27.5–37.4)
AST SERPL-CCNC: 22 U/L — SIGNIFICANT CHANGE UP (ref 4–40)
BASE EXCESS BLDV CALC-SCNC: 1.6 MMOL/L — SIGNIFICANT CHANGE UP
BASOPHILS # BLD AUTO: 0.02 K/UL — SIGNIFICANT CHANGE UP (ref 0–0.2)
BASOPHILS NFR BLD AUTO: 0.5 % — SIGNIFICANT CHANGE UP (ref 0–2)
BASOPHILS NFR SPEC: 0.9 % — SIGNIFICANT CHANGE UP (ref 0–2)
BILIRUB SERPL-MCNC: 0.4 MG/DL — SIGNIFICANT CHANGE UP (ref 0.2–1.2)
BLASTS # FLD: 0 % — SIGNIFICANT CHANGE UP (ref 0–0)
BLOOD GAS VENOUS - CREATININE: 1.06 MG/DL — SIGNIFICANT CHANGE UP (ref 0.5–1.3)
BUN SERPL-MCNC: 23 MG/DL — SIGNIFICANT CHANGE UP (ref 7–23)
BURR CELLS BLD QL SMEAR: PRESENT — SIGNIFICANT CHANGE UP
CALCIUM SERPL-MCNC: 8.8 MG/DL — SIGNIFICANT CHANGE UP (ref 8.4–10.5)
CHLORIDE BLDV-SCNC: 111 MMOL/L — HIGH (ref 96–108)
CHLORIDE SERPL-SCNC: 107 MMOL/L — SIGNIFICANT CHANGE UP (ref 98–107)
CO2 SERPL-SCNC: 25 MMOL/L — SIGNIFICANT CHANGE UP (ref 22–31)
CREAT SERPL-MCNC: 1.13 MG/DL — SIGNIFICANT CHANGE UP (ref 0.5–1.3)
EOSINOPHIL # BLD AUTO: 0.03 K/UL — SIGNIFICANT CHANGE UP (ref 0–0.5)
EOSINOPHIL NFR BLD AUTO: 0.7 % — SIGNIFICANT CHANGE UP (ref 0–6)
EOSINOPHIL NFR FLD: 0 % — SIGNIFICANT CHANGE UP (ref 0–6)
GAS PNL BLDV: 137 MMOL/L — SIGNIFICANT CHANGE UP (ref 136–146)
GIANT PLATELETS BLD QL SMEAR: PRESENT — SIGNIFICANT CHANGE UP
GLUCOSE BLDV-MCNC: 142 — HIGH (ref 70–99)
GLUCOSE SERPL-MCNC: 147 MG/DL — HIGH (ref 70–99)
HCO3 BLDV-SCNC: 24 MMOL/L — SIGNIFICANT CHANGE UP (ref 20–27)
HCT VFR BLD CALC: 31.2 % — LOW (ref 39–50)
HCT VFR BLDV CALC: 32.9 % — LOW (ref 39–51)
HGB BLD-MCNC: 10.2 G/DL — LOW (ref 13–17)
HGB BLDV-MCNC: 10.6 G/DL — LOW (ref 13–17)
HYPOCHROMIA BLD QL: SLIGHT — SIGNIFICANT CHANGE UP
IMM GRANULOCYTES # BLD AUTO: 0.01 # — SIGNIFICANT CHANGE UP
IMM GRANULOCYTES NFR BLD AUTO: 0.2 % — SIGNIFICANT CHANGE UP (ref 0–1.5)
INR BLD: 1.3 — HIGH (ref 0.88–1.17)
LACTATE BLDV-MCNC: 1.5 MMOL/L — SIGNIFICANT CHANGE UP (ref 0.5–2)
LYMPHOCYTES # BLD AUTO: 0.46 K/UL — LOW (ref 1–3.3)
LYMPHOCYTES # BLD AUTO: 10.4 % — LOW (ref 13–44)
LYMPHOCYTES NFR SPEC AUTO: 2.6 % — LOW (ref 13–44)
MACROCYTES BLD QL: SLIGHT — SIGNIFICANT CHANGE UP
MCHC RBC-ENTMCNC: 31 PG — SIGNIFICANT CHANGE UP (ref 27–34)
MCHC RBC-ENTMCNC: 32.7 % — SIGNIFICANT CHANGE UP (ref 32–36)
MCV RBC AUTO: 94.8 FL — SIGNIFICANT CHANGE UP (ref 80–100)
METAMYELOCYTES # FLD: 0 % — SIGNIFICANT CHANGE UP (ref 0–1)
MONOCYTES # BLD AUTO: 0.3 K/UL — SIGNIFICANT CHANGE UP (ref 0–0.9)
MONOCYTES NFR BLD AUTO: 6.8 % — SIGNIFICANT CHANGE UP (ref 2–14)
MONOCYTES NFR BLD: 3.5 % — SIGNIFICANT CHANGE UP (ref 2–9)
MYELOCYTES NFR BLD: 0.9 % — HIGH (ref 0–0)
NEUTROPHIL AB SER-ACNC: 90.3 % — HIGH (ref 43–77)
NEUTROPHILS # BLD AUTO: 3.59 K/UL — SIGNIFICANT CHANGE UP (ref 1.8–7.4)
NEUTROPHILS NFR BLD AUTO: 81.4 % — HIGH (ref 43–77)
NEUTS BAND # BLD: 0 % — SIGNIFICANT CHANGE UP (ref 0–6)
NRBC # FLD: 0 — SIGNIFICANT CHANGE UP
OB PNL STL: POSITIVE — SIGNIFICANT CHANGE UP
OTHER - HEMATOLOGY %: 0 — SIGNIFICANT CHANGE UP
OVALOCYTES BLD QL SMEAR: SLIGHT — SIGNIFICANT CHANGE UP
PCO2 BLDV: 56 MMHG — HIGH (ref 41–51)
PH BLDV: 7.31 PH — LOW (ref 7.32–7.43)
PLATELET # BLD AUTO: 156 K/UL — SIGNIFICANT CHANGE UP (ref 150–400)
PLATELET COUNT - ESTIMATE: NORMAL — SIGNIFICANT CHANGE UP
PMV BLD: 10.5 FL — SIGNIFICANT CHANGE UP (ref 7–13)
PO2 BLDV: < 24 MMHG — LOW (ref 35–40)
POIKILOCYTOSIS BLD QL AUTO: SLIGHT — SIGNIFICANT CHANGE UP
POLYCHROMASIA BLD QL SMEAR: SLIGHT — SIGNIFICANT CHANGE UP
POTASSIUM BLDV-SCNC: 4.2 MMOL/L — SIGNIFICANT CHANGE UP (ref 3.4–4.5)
POTASSIUM SERPL-MCNC: 4.5 MMOL/L — SIGNIFICANT CHANGE UP (ref 3.5–5.3)
POTASSIUM SERPL-SCNC: 4.5 MMOL/L — SIGNIFICANT CHANGE UP (ref 3.5–5.3)
PROMYELOCYTES # FLD: 0 % — SIGNIFICANT CHANGE UP (ref 0–0)
PROT SERPL-MCNC: 6.3 G/DL — SIGNIFICANT CHANGE UP (ref 6–8.3)
PROTHROM AB SERPL-ACNC: 14.5 SEC — HIGH (ref 9.8–13.1)
RBC # BLD: 3.29 M/UL — LOW (ref 4.2–5.8)
RBC # FLD: 13.4 % — SIGNIFICANT CHANGE UP (ref 10.3–14.5)
SAO2 % BLDV: 24.2 % — LOW (ref 60–85)
SODIUM SERPL-SCNC: 142 MMOL/L — SIGNIFICANT CHANGE UP (ref 135–145)
VARIANT LYMPHS # BLD: 1.8 % — SIGNIFICANT CHANGE UP
WBC # BLD: 4.41 K/UL — SIGNIFICANT CHANGE UP (ref 3.8–10.5)
WBC # FLD AUTO: 4.41 K/UL — SIGNIFICANT CHANGE UP (ref 3.8–10.5)

## 2018-10-21 PROCEDURE — 71045 X-RAY EXAM CHEST 1 VIEW: CPT | Mod: 26

## 2018-10-21 PROCEDURE — 74174 CTA ABD&PLVS W/CONTRAST: CPT | Mod: 26

## 2018-10-21 PROCEDURE — 74178 CT ABD&PLV WO CNTR FLWD CNTR: CPT | Mod: 26

## 2018-10-21 RX ORDER — PANTOPRAZOLE SODIUM 20 MG/1
80 TABLET, DELAYED RELEASE ORAL ONCE
Qty: 0 | Refills: 0 | Status: COMPLETED | OUTPATIENT
Start: 2018-10-21 | End: 2018-10-21

## 2018-10-21 RX ORDER — SODIUM CHLORIDE 9 MG/ML
1000 INJECTION INTRAMUSCULAR; INTRAVENOUS; SUBCUTANEOUS ONCE
Qty: 0 | Refills: 0 | Status: COMPLETED | OUTPATIENT
Start: 2018-10-21 | End: 2018-10-21

## 2018-10-21 RX ADMIN — PANTOPRAZOLE SODIUM 80 MILLIGRAM(S): 20 TABLET, DELAYED RELEASE ORAL at 22:30

## 2018-10-21 RX ADMIN — SODIUM CHLORIDE 1000 MILLILITER(S): 9 INJECTION INTRAMUSCULAR; INTRAVENOUS; SUBCUTANEOUS at 23:14

## 2018-10-21 NOTE — ED ADULT NURSE NOTE - OBJECTIVE STATEMENT
Pt able to independently ambulate into rm 25 with history of colon CA. Pt had recent dilation on tuesday and comes in complaining of blood BM. Pt states 3 days he had dark black stools, and today started having large amounts of bright red bloody stools accompanied with dizziness, lightheadedness, N&V. Pt denies pain with BM.        20G IV inserted into LAC. labs drawn and sent as ordered.   MD Bee at bedside examining patient. Pt able to independently ambulate into rm 25 with history of colon CA. Pt speaks [place]ar[place], Family is at bedside translating. Pt had recent dilation on tuesday and comes in complaining of blood BM. Pt states 3 days he had dark black stools, and today started having large amounts of bright red bloody stools accompanied with dizziness, lightheadedness, N&V. Pt denies pain with BM.   At this time pt denies pain, dizziness, lightheadedness, changes in vision, N&V. ABD is soft and non-tender. bowel sounds are present in all quadrants.    20G IV inserted into LAC. labs drawn and sent as ordered.   MD Bee at bedside examining patient.

## 2018-10-21 NOTE — ED PROVIDER NOTE - PROGRESS NOTE DETAILS
pt no distress. Informed of labs. plan transfuse given BRBPR. pending ct to be done. will be admitted. consult gastroenterology. endorse to Dr Bryant.

## 2018-10-21 NOTE — ED PROVIDER NOTE - NS ED ROS FT
CONST: no fevers, no chills, no trauma  EYES: no pain, no visual disturbances  ENT: no sore throat, no epistaxis, no rhinorrhea, no hearing changes  CV: no chest pain, no palpitations, no orthopnea, no extremity pain or swelling  RESP: no shortness of breath, no cough, no sputum, no pleurisy, no wheezing  ABD: no abdominal pain, no nausea, no vomiting, no diarrhea, + black & bloody stool  : no dysuria, no hematuria, no frequency, no urgency  MSK: no back pain, no neck pain, no extremity pain  NEURO: no headache, no sensory disturbances, no focal weakness, no dizziness  HEME: no easy bleeding or bruising  SKIN: no diaphoresis, no rash

## 2018-10-21 NOTE — ED ADULT NURSE NOTE - CHIEF COMPLAINT QUOTE
pt Macanese speaking, daughter as preferred , c/o blood in stools since today. reports it is bright red accompanied by abdominal pain, lightheadedness, dizziness and n/v. hx of stomach ca with 80% removal, did procedure on tuesday "where they open up the stomach every year." hx anemia, no blood transfusions. as per daughter "pt became a little yellowish today." appears pale.

## 2018-10-21 NOTE — ED ADULT NURSE NOTE - NSIMPLEMENTINTERV_GEN_ALL_ED
Implemented All Fall Risk Interventions:  [place] to call system. Call bell, personal items and telephone within reach. Instruct patient to call for assistance. Room bathroom lighting operational. Non-slip footwear when patient is off stretcher. Physically safe environment: no spills, clutter or unnecessary equipment. Stretcher in lowest position, wheels locked, appropriate side rails in place. Provide visual cue, wrist band, yellow gown, etc. Monitor gait and stability. Monitor for mental status changes and reorient to person, place, and time. Review medications for side effects contributing to fall risk. Reinforce activity limits and safety measures with patient and family.

## 2018-10-21 NOTE — ED PROVIDER NOTE - OBJECTIVE STATEMENT
69 yo M hx A fib, BPH, COPD, DM2, HLD, gastric cancer s/p partial gastrectomy with subsequent EGD strictures requiring monthly dilations, presenting with two days bloody stools s/p dilation 6 days ago. Reports constipation after procedure, with one black tarry bowel movement, took laxative, and subsequently developed brisk bright red blood with stools and associated light headedness. Denies chest pain/shortness of breath. No pain with bowel movements. Daughter reports he initially presented September 2017 with GIB before being diagnosed with gastric cancer. Pt denies EOTH use.

## 2018-10-21 NOTE — ED ADULT TRIAGE NOTE - CHIEF COMPLAINT QUOTE
pt Sudanese speaking, daughter as preferred , c/o blood in stools since today. reports it is bright red accompanied by abdominal pain, lightheadedness, dizziness and n/v. hx of stomach ca with 80% removal, did procedure on tuesday "where they open up the stomach every year." hx anemia, no blood transfusions. as per daughter "pt became a little yellowish today." appears pale.

## 2018-10-21 NOTE — ED PROVIDER NOTE - MEDICAL DECISION MAKING DETAILS
69 yo M with history gastric cancer s/p EGD dilation 5 days ago presenting with GIB, + stool guaiac, labs, ct angio, tba

## 2018-10-21 NOTE — ED PROVIDER NOTE - PHYSICAL EXAMINATION
VITALS: reviewed  GEN: NAD, A & O x 4  HEAD/EYES: NCAT, PERRL, EOMI, anicteric sclerae, + conjunctival pallor  ENT: mucus membranes moist, oropharynx WNL, trachea midline, no JVD  RESP: lungs CTA with equal breath sounds bilaterally, chest wall nontender and atraumatic  CV: heart with reg rhythm S1, S2; distal pulses intact and symmetric bilaterally  ABDOMEN: normoactive bowel sounds, soft, nondistended, nontender, no palpable masses  : no CVAT  MSK: extremities atraumatic and nontender, no edema, no asymmetry.  SKIN: warm, dry, no rash, no bruising, no cyanosis. color pale for ethnicity  NEURO: alert, mentating appropriately, no facial asymmetry. gross sensation, motor, coordination are intact  PSYCH: Affect appropriate

## 2018-10-22 ENCOUNTER — RESULT REVIEW (OUTPATIENT)
Age: 68
End: 2018-10-22

## 2018-10-22 DIAGNOSIS — C16.9 MALIGNANT NEOPLASM OF STOMACH, UNSPECIFIED: ICD-10-CM

## 2018-10-22 DIAGNOSIS — E78.5 HYPERLIPIDEMIA, UNSPECIFIED: ICD-10-CM

## 2018-10-22 DIAGNOSIS — K92.2 GASTROINTESTINAL HEMORRHAGE, UNSPECIFIED: ICD-10-CM

## 2018-10-22 DIAGNOSIS — E11.9 TYPE 2 DIABETES MELLITUS WITHOUT COMPLICATIONS: ICD-10-CM

## 2018-10-22 DIAGNOSIS — Z79.899 OTHER LONG TERM (CURRENT) DRUG THERAPY: ICD-10-CM

## 2018-10-22 DIAGNOSIS — B19.10 UNSPECIFIED VIRAL HEPATITIS B WITHOUT HEPATIC COMA: ICD-10-CM

## 2018-10-22 DIAGNOSIS — Z29.9 ENCOUNTER FOR PROPHYLACTIC MEASURES, UNSPECIFIED: ICD-10-CM

## 2018-10-22 DIAGNOSIS — I48.91 UNSPECIFIED ATRIAL FIBRILLATION: ICD-10-CM

## 2018-10-22 DIAGNOSIS — N40.0 BENIGN PROSTATIC HYPERPLASIA WITHOUT LOWER URINARY TRACT SYMPTOMS: ICD-10-CM

## 2018-10-22 DIAGNOSIS — J44.9 CHRONIC OBSTRUCTIVE PULMONARY DISEASE, UNSPECIFIED: ICD-10-CM

## 2018-10-22 PROBLEM — J18.9 PNEUMONIA, UNSPECIFIED ORGANISM: Chronic | Status: ACTIVE | Noted: 2017-11-17

## 2018-10-22 PROBLEM — R41.3 OTHER AMNESIA: Chronic | Status: ACTIVE | Noted: 2017-08-22

## 2018-10-22 LAB
BASE EXCESS BLDV CALC-SCNC: 1.3 MMOL/L — SIGNIFICANT CHANGE UP
BLD GP AB SCN SERPL QL: NEGATIVE — SIGNIFICANT CHANGE UP
BLOOD GAS VENOUS - CREATININE: 0.87 MG/DL — SIGNIFICANT CHANGE UP (ref 0.5–1.3)
CHLORIDE BLDV-SCNC: 111 MMOL/L — HIGH (ref 96–108)
CHOLEST SERPL-MCNC: 96 MG/DL — LOW (ref 120–199)
GAS PNL BLDV: 137 MMOL/L — SIGNIFICANT CHANGE UP (ref 136–146)
GLUCOSE BLDV-MCNC: 78 — SIGNIFICANT CHANGE UP (ref 70–99)
HCO3 BLDV-SCNC: 25 MMOL/L — SIGNIFICANT CHANGE UP (ref 20–27)
HCT VFR BLD CALC: 29.1 % — LOW (ref 39–50)
HCT VFR BLD CALC: 30 % — LOW (ref 39–50)
HCT VFR BLDV CALC: 31.9 % — LOW (ref 39–51)
HDLC SERPL-MCNC: 33 MG/DL — LOW (ref 35–55)
HGB BLD-MCNC: 9.7 G/DL — LOW (ref 13–17)
HGB BLD-MCNC: 9.9 G/DL — LOW (ref 13–17)
HGB BLDV-MCNC: 10.3 G/DL — LOW (ref 13–17)
LACTATE BLDV-MCNC: 1.1 MMOL/L — SIGNIFICANT CHANGE UP (ref 0.5–2)
LIPID PNL WITH DIRECT LDL SERPL: 52 MG/DL — SIGNIFICANT CHANGE UP
MCHC RBC-ENTMCNC: 30.2 PG — SIGNIFICANT CHANGE UP (ref 27–34)
MCHC RBC-ENTMCNC: 30.5 PG — SIGNIFICANT CHANGE UP (ref 27–34)
MCHC RBC-ENTMCNC: 33 % — SIGNIFICANT CHANGE UP (ref 32–36)
MCHC RBC-ENTMCNC: 33.3 % — SIGNIFICANT CHANGE UP (ref 32–36)
MCV RBC AUTO: 90.7 FL — SIGNIFICANT CHANGE UP (ref 80–100)
MCV RBC AUTO: 92.3 FL — SIGNIFICANT CHANGE UP (ref 80–100)
NRBC # FLD: 0 — SIGNIFICANT CHANGE UP
NRBC # FLD: 0 — SIGNIFICANT CHANGE UP
PCO2 BLDV: 44 MMHG — SIGNIFICANT CHANGE UP (ref 41–51)
PH BLDV: 7.39 PH — SIGNIFICANT CHANGE UP (ref 7.32–7.43)
PLATELET # BLD AUTO: 108 K/UL — LOW (ref 150–400)
PLATELET # BLD AUTO: 110 K/UL — LOW (ref 150–400)
PMV BLD: 10.4 FL — SIGNIFICANT CHANGE UP (ref 7–13)
PMV BLD: 11.3 FL — SIGNIFICANT CHANGE UP (ref 7–13)
PO2 BLDV: 66 MMHG — HIGH (ref 35–40)
POTASSIUM BLDV-SCNC: 3.9 MMOL/L — SIGNIFICANT CHANGE UP (ref 3.4–4.5)
RBC # BLD: 3.21 M/UL — LOW (ref 4.2–5.8)
RBC # BLD: 3.25 M/UL — LOW (ref 4.2–5.8)
RBC # FLD: 14.5 % — SIGNIFICANT CHANGE UP (ref 10.3–14.5)
RBC # FLD: 14.6 % — HIGH (ref 10.3–14.5)
RH IG SCN BLD-IMP: POSITIVE — SIGNIFICANT CHANGE UP
SAO2 % BLDV: 89.7 % — HIGH (ref 60–85)
TRIGL SERPL-MCNC: 89 MG/DL — SIGNIFICANT CHANGE UP (ref 10–149)
WBC # BLD: 2.95 K/UL — LOW (ref 3.8–10.5)
WBC # BLD: 3.66 K/UL — LOW (ref 3.8–10.5)
WBC # FLD AUTO: 2.95 K/UL — LOW (ref 3.8–10.5)
WBC # FLD AUTO: 3.66 K/UL — LOW (ref 3.8–10.5)

## 2018-10-22 PROCEDURE — 99223 1ST HOSP IP/OBS HIGH 75: CPT | Mod: GC

## 2018-10-22 PROCEDURE — 99222 1ST HOSP IP/OBS MODERATE 55: CPT | Mod: GC,25

## 2018-10-22 PROCEDURE — 88305 TISSUE EXAM BY PATHOLOGIST: CPT | Mod: 26

## 2018-10-22 PROCEDURE — 12345: CPT | Mod: NC

## 2018-10-22 PROCEDURE — 43239 EGD BIOPSY SINGLE/MULTIPLE: CPT | Mod: GC

## 2018-10-22 RX ORDER — ASPIRIN/CALCIUM CARB/MAGNESIUM 324 MG
1 TABLET ORAL
Qty: 0 | Refills: 0 | COMMUNITY

## 2018-10-22 RX ORDER — SUCRALFATE 1 G
1 TABLET ORAL
Qty: 0 | Refills: 0 | Status: DISCONTINUED | OUTPATIENT
Start: 2018-10-22 | End: 2018-10-23

## 2018-10-22 RX ORDER — SITAGLIPTIN 50 MG/1
1 TABLET, FILM COATED ORAL
Qty: 0 | Refills: 0 | COMMUNITY

## 2018-10-22 RX ORDER — MEMANTINE HYDROCHLORIDE 10 MG/1
1 TABLET ORAL
Qty: 0 | Refills: 0 | COMMUNITY

## 2018-10-22 RX ORDER — SOTALOL HCL 120 MG
80 TABLET ORAL
Qty: 0 | Refills: 0 | Status: DISCONTINUED | OUTPATIENT
Start: 2018-10-22 | End: 2018-10-23

## 2018-10-22 RX ORDER — TAMSULOSIN HYDROCHLORIDE 0.4 MG/1
1 CAPSULE ORAL
Qty: 0 | Refills: 0 | COMMUNITY

## 2018-10-22 RX ORDER — PANTOPRAZOLE SODIUM 20 MG/1
40 TABLET, DELAYED RELEASE ORAL ONCE
Qty: 0 | Refills: 0 | Status: DISCONTINUED | OUTPATIENT
Start: 2018-10-22 | End: 2018-10-22

## 2018-10-22 RX ORDER — TAMSULOSIN HYDROCHLORIDE 0.4 MG/1
0.4 CAPSULE ORAL AT BEDTIME
Qty: 0 | Refills: 0 | Status: DISCONTINUED | OUTPATIENT
Start: 2018-10-22 | End: 2018-10-23

## 2018-10-22 RX ORDER — PANTOPRAZOLE SODIUM 20 MG/1
40 TABLET, DELAYED RELEASE ORAL
Qty: 0 | Refills: 0 | Status: DISCONTINUED | OUTPATIENT
Start: 2018-10-23 | End: 2018-10-23

## 2018-10-22 RX ORDER — ATORVASTATIN CALCIUM 80 MG/1
20 TABLET, FILM COATED ORAL AT BEDTIME
Qty: 0 | Refills: 0 | Status: DISCONTINUED | OUTPATIENT
Start: 2018-10-22 | End: 2018-10-23

## 2018-10-22 RX ORDER — SUCRALFATE 1 G
1 TABLET ORAL
Qty: 0 | Refills: 0 | Status: DISCONTINUED | OUTPATIENT
Start: 2018-10-22 | End: 2018-10-22

## 2018-10-22 RX ORDER — BUDESONIDE AND FORMOTEROL FUMARATE DIHYDRATE 160; 4.5 UG/1; UG/1
2 AEROSOL RESPIRATORY (INHALATION)
Qty: 0 | Refills: 0 | Status: DISCONTINUED | OUTPATIENT
Start: 2018-10-22 | End: 2018-10-23

## 2018-10-22 RX ORDER — SERTRALINE 25 MG/1
1 TABLET, FILM COATED ORAL
Qty: 0 | Refills: 0 | COMMUNITY

## 2018-10-22 RX ORDER — CYCLOSPORINE 0.5 MG/ML
0 EMULSION OPHTHALMIC
Qty: 60 | Refills: 0 | COMMUNITY

## 2018-10-22 RX ORDER — TIOTROPIUM BROMIDE 18 UG/1
1 CAPSULE ORAL; RESPIRATORY (INHALATION) DAILY
Qty: 0 | Refills: 0 | Status: DISCONTINUED | OUTPATIENT
Start: 2018-10-22 | End: 2018-10-23

## 2018-10-22 RX ORDER — SUCRALFATE 1 G
0 TABLET ORAL
Qty: 0 | Refills: 0 | COMMUNITY

## 2018-10-22 RX ORDER — TIOTROPIUM BROMIDE 18 UG/1
0 CAPSULE ORAL; RESPIRATORY (INHALATION)
Qty: 30 | Refills: 0 | COMMUNITY

## 2018-10-22 RX ORDER — SOTALOL HCL 120 MG
0 TABLET ORAL
Qty: 0 | Refills: 0 | COMMUNITY

## 2018-10-22 RX ORDER — OMEPRAZOLE 10 MG/1
1 CAPSULE, DELAYED RELEASE ORAL
Qty: 0 | Refills: 0 | COMMUNITY

## 2018-10-22 RX ORDER — PANTOPRAZOLE SODIUM 20 MG/1
40 TABLET, DELAYED RELEASE ORAL ONCE
Qty: 0 | Refills: 0 | Status: COMPLETED | OUTPATIENT
Start: 2018-10-22 | End: 2018-10-22

## 2018-10-22 RX ORDER — PANTOPRAZOLE SODIUM 20 MG/1
40 TABLET, DELAYED RELEASE ORAL
Qty: 0 | Refills: 0 | Status: DISCONTINUED | OUTPATIENT
Start: 2018-10-22 | End: 2018-10-22

## 2018-10-22 RX ORDER — LIPASE/PROTEASE/AMYLASE 16-48-48K
1 CAPSULE,DELAYED RELEASE (ENTERIC COATED) ORAL
Qty: 0 | Refills: 0 | COMMUNITY

## 2018-10-22 RX ORDER — SERTRALINE 25 MG/1
0.5 TABLET, FILM COATED ORAL
Qty: 0 | Refills: 0 | COMMUNITY

## 2018-10-22 RX ORDER — BUDESONIDE AND FORMOTEROL FUMARATE DIHYDRATE 160; 4.5 UG/1; UG/1
0 AEROSOL RESPIRATORY (INHALATION)
Qty: 10.2 | Refills: 0 | COMMUNITY

## 2018-10-22 RX ORDER — DEXLANSOPRAZOLE 30 MG/1
0 CAPSULE, DELAYED RELEASE ORAL
Qty: 30 | Refills: 0 | COMMUNITY

## 2018-10-22 RX ORDER — ENTECAVIR 0.5 MG/1
0 TABLET ORAL
Qty: 30 | Refills: 0 | COMMUNITY

## 2018-10-22 RX ORDER — PANTOPRAZOLE SODIUM 20 MG/1
8 TABLET, DELAYED RELEASE ORAL
Qty: 80 | Refills: 0 | Status: DISCONTINUED | OUTPATIENT
Start: 2018-10-22 | End: 2018-10-22

## 2018-10-22 RX ORDER — MONTELUKAST 4 MG/1
0 TABLET, CHEWABLE ORAL
Qty: 30 | Refills: 0 | COMMUNITY

## 2018-10-22 RX ORDER — DILTIAZEM HCL 120 MG
0 CAPSULE, EXT RELEASE 24 HR ORAL
Qty: 30 | Refills: 0 | COMMUNITY

## 2018-10-22 RX ORDER — TENOFOVIR DISOPROXIL FUMARATE 300 MG/1
25 TABLET, FILM COATED ORAL DAILY
Qty: 0 | Refills: 0 | Status: DISCONTINUED | OUTPATIENT
Start: 2018-10-22 | End: 2018-10-23

## 2018-10-22 RX ORDER — IPRATROPIUM/ALBUTEROL SULFATE 18-103MCG
3 AEROSOL WITH ADAPTER (GRAM) INHALATION EVERY 6 HOURS
Qty: 0 | Refills: 0 | Status: DISCONTINUED | OUTPATIENT
Start: 2018-10-22 | End: 2018-10-23

## 2018-10-22 RX ORDER — METFORMIN HYDROCHLORIDE 850 MG/1
0 TABLET ORAL
Qty: 90 | Refills: 0 | COMMUNITY

## 2018-10-22 RX ADMIN — ATORVASTATIN CALCIUM 20 MILLIGRAM(S): 80 TABLET, FILM COATED ORAL at 21:56

## 2018-10-22 RX ADMIN — Medication 80 MILLIGRAM(S): at 21:56

## 2018-10-22 RX ADMIN — SODIUM CHLORIDE 1000 MILLILITER(S): 9 INJECTION INTRAMUSCULAR; INTRAVENOUS; SUBCUTANEOUS at 01:16

## 2018-10-22 RX ADMIN — TAMSULOSIN HYDROCHLORIDE 0.4 MILLIGRAM(S): 0.4 CAPSULE ORAL at 21:56

## 2018-10-22 RX ADMIN — PANTOPRAZOLE SODIUM 40 MILLIGRAM(S): 20 TABLET, DELAYED RELEASE ORAL at 06:39

## 2018-10-22 RX ADMIN — TIOTROPIUM BROMIDE 1 CAPSULE(S): 18 CAPSULE ORAL; RESPIRATORY (INHALATION) at 12:01

## 2018-10-22 RX ADMIN — PANTOPRAZOLE SODIUM 40 MILLIGRAM(S): 20 TABLET, DELAYED RELEASE ORAL at 12:11

## 2018-10-22 RX ADMIN — Medication 1 GRAM(S): at 21:56

## 2018-10-22 RX ADMIN — Medication 80 MILLIGRAM(S): at 06:39

## 2018-10-22 RX ADMIN — PANTOPRAZOLE SODIUM 10 MG/HR: 20 TABLET, DELAYED RELEASE ORAL at 12:01

## 2018-10-22 RX ADMIN — BUDESONIDE AND FORMOTEROL FUMARATE DIHYDRATE 2 PUFF(S): 160; 4.5 AEROSOL RESPIRATORY (INHALATION) at 12:01

## 2018-10-22 NOTE — H&P ADULT - HISTORY OF PRESENT ILLNESS
67 year-old Malaysian-speaking male w/ pmhx of afib not on AC, gastric CA s/p resection with monthly dilations, bph presenting for GI bleed. He states that he had 2 bouts of black tarry stools without associated abdominal pain, fever or chills. He states that he had a gastric dilation on 10/16 which stitches placed and then a transrectal biopsy 3 days ago. He states that he did not have any other symptoms prior but since the BM has had N, presyncope with vertigo and weakness which improved from resting. Patient seen after 1u prbcs and states that he is feeling better, just continues to feel weak.   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   In ED: vitals: 97.8; 92; 98/55; 100% on RA. Given pantoprazole 80mg, 1L NaCl, 1u PRBCs 68 year-old [place]-speaking male w/ pmhx of afib not on AC, gastric CA s/p resection with monthly dilations, bph presenting for GI bleed. He states that he had 2 bouts of black tarry stools without associated abdominal pain, fever or chills. He states that he had a gastric dilation on 10/16 which stitches placed and then a transrectal biopsy 3 days ago. He states that he did not have any other symptoms prior but since the BM has had N, presyncope with vertigo and weakness which improved from resting. Patient seen after 1u prbcs and states that he is feeling better, just continues to feel weak.   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   In ED: vitals: 97.8; 92; 98/55; 100% on RA. Given pantoprazole 80mg, 1L NaCl, 1u PRBCs 68 year-old [place]ian-speaking male w/ pmhx of afib s/p watchman June 2017 now on ASA, gastric CA s/p resection with monthly dilations, BPH, and HBV presenting for GI bleed. He states that he had 2 bouts of black tarry stools without associated abdominal pain, fever or chills. Said that he was in his usual state of health until 10/16 when he went for a dilation procedure, after the procedure, eh was told by his gastroenterologist that a stitch was placed during the procedure, said that he felt a little poorly after the procedure but otherwise denied any major complaints. He was having some constipation and took a cathartic medication and had a large bloody BM at around 3 AM on 10/21. Said that he was dark/black stool with red blood on top. He then had another similar BM at around 6 PM and at that time started to feel very weak and tired and therefore came to the hospital for evaluation. Denies any such bleeding during prior dilations. Of note, patient states that he had a prostate biopsy 2-3 days after his dilation. Currently the patient said that he feels better after receiving 1U pRBC transfusion, denies having any further blood per rectum since yesterday but said that he is still having acid reflux.     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     In ED: vitals: 97.8; 92; 98/55; 100% on RA. Given pantoprazole 80mg, 1L NaCl, 1u PRBCs. He was admitted to medicine.

## 2018-10-22 NOTE — CONSULT NOTE ADULT - ASSESSMENT
Impression:  # Anemia with GI Bleed: Hemoglobin of 9.9, last baseline was 11.0. Suspect bleeding related to known gastric tumor vs trauma from recent dilation. Differential Diagnosis also includes PUD, angioectasias, esophagitis, erosive gastritis and dieulofoy lesion. Can't rule out potential diverticulosis as potential source (less likely, but sigmoid diverticuli seen on CT)    Recommendations:  - NPO  - Protonix 80mg IV x 1, followed by Protonix ggt at 8  - Trend CBC and monitor for signs of bleeding  - Supportive care per primary team     Thuy Mnotano MD  Gastroenterology Fellow  [phone]  Please page on call fellow on weekends and after 5pm on weekdays Impression:  # Anemia with GI Bleed: Hemoglobin of 9.9, last baseline was 11.0. Suspect bleeding related to known gastric tumor vs trauma from recent dilation. Differential Diagnosis also includes PUD, angioectasias, esophagitis, erosive gastritis and dieulofoy lesion. Can't rule out potential diverticulosis as potential source (less likely, but sigmoid diverticuli seen on CT)    Recommendations:  - NPO for EGD today to evaluate source of bleeding  - Protonix 80mg IV x 1, followed by Protonix ggt at 8  - Trend CBC and monitor for signs of bleeding  - Supportive care per primary team     Thuy Montano MD  Gastroenterology Fellow  [phone]  Please page on call fellow on weekends and after 5pm on weekdays Impression:  # Anemia with GI Bleeding (acute posthemorrhagic anemia): Hemoglobin of 9.9, last baseline was 11.0. Suspect bleeding related to known gastric tumor vs trauma from recent dilation.  Differential Diagnosis also includes PUD, angioectasias, esophagitis, erosive gastritis and dieulofoy lesion. Can't rule out potential diverticulosis as potential source (less likely, but sigmoid diverticuli seen on CT)    Recommendations:  - NPO for EGD today to evaluate source of bleeding  - Protonix 80mg IV x 1, followed by Protonix infusion at 8  - Trend CBC and monitor for signs of bleeding  - Supportive care per primary team     Thuy Montano MD  Gastroenterology Fellow  [phone]  Please page on call fellow on weekends and after 5pm on weekdays

## 2018-10-22 NOTE — H&P ADULT - NSHPPHYSICALEXAM_GEN_ALL_CORE
Vital Signs Last 24 Hrs  T(C): 36.5 (22 Oct 2018 05:28), Max: 36.6 (21 Oct 2018 21:10)  T(F): 97.7 (22 Oct 2018 05:28), Max: 97.9 (22 Oct 2018 03:55)  HR: 88 (22 Oct 2018 05:28) (88 - 100)  BP: 132/65 (22 Oct 2018 05:28) (98/55 - 132/65)  BP(mean): --  RR: 18 (22 Oct 2018 05:28) (12 - 18)  SpO2: 100% (22 Oct 2018 05:28) (97% - 100%)  CAPILLARY BLOOD GLUCOSE        I&O's Summary    PHYSICAL EXAM:  GENERAL: NAD, well-developed  HEAD:  Atraumatic, Normocephalic  EYES: EOMI, PERRLA, conjunctiva and sclera clear  NECK: Supple, No JVD  CHEST/LUNG: Clear to auscultation bilaterally; No wheeze  HEART: irregularly irregular No murmurs, rubs, or gallops  ABDOMEN: Soft, Nontender, Nondistended; Bowel sounds present; well healed abdominal scar  EXTREMITIES:  2+ Peripheral Pulses, No clubbing, cyanosis, or edema  PSYCH: AAOx3  NEUROLOGY: non-focal  SKIN: No rashes or lesions Vital Signs Last 24 Hrs  T(C): 36.5 (22 Oct 2018 05:28), Max: 36.6 (21 Oct 2018 21:10)  T(F): 97.7 (22 Oct 2018 05:28), Max: 97.9 (22 Oct 2018 03:55)  HR: 88 (22 Oct 2018 05:28) (88 - 100)  BP: 132/65 (22 Oct 2018 05:28) (98/55 - 132/65)  BP(mean): --  RR: 18 (22 Oct 2018 05:28) (12 - 18)  SpO2: 100% (22 Oct 2018 05:28) (97% - 100%)    PHYSICAL EXAM:  GENERAL: NAD, well-developed  EYES: EOMI, PERRLA, conjunctiva and sclera clear  NECK: Supple, No JVD  CHEST/LUNG: Clear to auscultation bilaterally; No wheeze  HEART: irregularly irregular No murmurs, rubs, or gallops  ABDOMEN: Soft, Nontender, Nondistended; Bowel sounds present; well healed abdominal scar  EXTREMITIES:  2+ Peripheral Pulses, No clubbing, cyanosis, or edema  PSYCH: AAOx3, nl behavior, nl affect  NEUROLOGY: non-focal, strength intact b/l, sensation intact to light touch, WRIGHT x4  SKIN: No rashes or lesions

## 2018-10-22 NOTE — H&P ADULT - PROBLEM SELECTOR PLAN 1
GI bleed potentially in setting of recent EGD vs transrectal bx, potentially with symptomatic anemia given pre-syncope s/p 1 u prbcs.  -continue with pantoprazole 40mg IV BID  -consult GI for potential gastric ca ulcer rebleed  -trend cbc q12, no active signs of bleeding currently GI bleed potentially in setting of recent EGD vs transrectal bx vs diverticulosis, potentially with symptomatic anemia given pre-syncope s/p 1 u prbcs. No evidence of GI bleed on CT ab  -continue with pantoprazole 40mg IV BID  -consult GI for potential gastric ca ulcer rebleed  -trend cbc q12, no active signs of bleeding currently GI bleed potentially in setting of recent EGD vs transrectal bx vs diverticulosis, potentially with symptomatic anemia given pre-syncope s/p 1 u prbcs. No evidence of GI bleed on CT ab, no symp of mesenteric ischemia  -continue with pantoprazole 40mg IV BID  -consult GI for potential gastric ca ulcer rebleed  -trend cbc q12, no active signs of bleeding currently GI bleed potentially in setting of recent EGD vs transrectal bx vs diverticulosis, potentially with symptomatic anemia given pre-syncope now s/p 1 u prbcs with improvement. No evidence of GI bleed on CT ab, no symp of mesenteric ischemia  -continue with pantoprazole 40mg IV BID  -consult GI for eval  -trend cbc q12, no active signs of bleeding currently

## 2018-10-22 NOTE — H&P ADULT - NSHPLABSRESULTS_GEN_ALL_CORE
LABS:                        10.2   4.41  )-----------( 156      ( 21 Oct 2018 21:50 )             31.2     10-21    142  |  107  |  23  ----------------------------<  147<H>  4.5   |  25  |  1.13    Ca    8.8      21 Oct 2018 21:50    TPro  6.3  /  Alb  3.8  /  TBili  0.4  /  DBili  x   /  AST  22  /  ALT  21  /  AlkPhos  132<H>  10-21    PT/INR - ( 21 Oct 2018 21:50 )   PT: 14.5 SEC;   INR: 1.30          PTT - ( 21 Oct 2018 21:50 )  PTT:27.4 SEC LABS:                        10.2   4.41  )-----------( 156      ( 21 Oct 2018 21:50 )             31.2     10-21    142  |  107  |  23  ----------------------------<  147<H>  4.5   |  25  |  1.13    Ca    8.8      21 Oct 2018 21:50    TPro  6.3  /  Alb  3.8  /  TBili  0.4  /  DBili  x   /  AST  22  /  ALT  21  /  AlkPhos  132<H>  10-21    PT/INR - ( 21 Oct 2018 21:50 )   PT: 14.5 SEC;   INR: 1.30          PTT - ( 21 Oct 2018 21:50 )  PTT:27.4 SEC    < from: CT Angio Abdomen and Pelvis w/ IV Cont (10.21.18 @ 23:59) >    IMPRESSION:  No CT evidence of acute gastrointestinal bleed. Status post partial   gastrectomy. Provided history of GI bleed, consider correlation with   nuclear scan or conventional angiography if symptoms persist.     Moderately severe narrowing of the proximal SMA, likely related to soft   tissue plaque which otherwise appears patent distally.     Sigmoid diverticulosis.    Nonspecific scattered hypoattenuation in the spleen, likely related to   intravenous contrast bolus artifact. Splenic infarcts considered in the   differential. LABS:                        10.2   4.41  )-----------( 156      ( 21 Oct 2018 21:50 )             31.2     10-21    142  |  107  |  23  ----------------------------<  147<H>  4.5   |  25  |  1.13    Ca    8.8      21 Oct 2018 21:50    TPro  6.3  /  Alb  3.8  /  TBili  0.4  /  DBili  x   /  AST  22  /  ALT  21  /  AlkPhos  132<H>  10-21    PT/INR - ( 21 Oct 2018 21:50 )   PT: 14.5 SEC;   INR: 1.30     PTT - ( 21 Oct 2018 21:50 )  PTT:27.4 SEC    < from: CT Angio Abdomen and Pelvis w/ IV Cont (10.21.18 @ 23:59) >    IMPRESSION:  No CT evidence of acute gastrointestinal bleed. Status post partial   gastrectomy. Provided history of GI bleed, consider correlation with   nuclear scan or conventional angiography if symptoms persist.     Moderately severe narrowing of the proximal SMA, likely related to soft   tissue plaque which otherwise appears patent distally.     Sigmoid diverticulosis.    Nonspecific scattered hypoattenuation in the spleen, likely related to   intravenous contrast bolus artifact. Splenic infarcts considered in the   differential.    EKG - AFib at 88, QTc 462, no significant ST changes from prior EKG

## 2018-10-22 NOTE — H&P ADULT - PROBLEM SELECTOR PLAN 2
LN+ gastric ca, s/p resection and neoadjuvant potentially leading to current presentation.  -plan as above

## 2018-10-22 NOTE — H&P ADULT - ATTENDING COMMENTS
Patient seen and examined on 10/22/18, case discussed with Dr. Segundo, agree with assessment and plan as above.

## 2018-10-22 NOTE — H&P ADULT - PROBLEM SELECTOR PLAN 10
- Some discrepancies between medications written by family at patient's bedside and medication on OMR, will have hospital pharmacist help with patient's medication reconciliation

## 2018-10-22 NOTE — PROGRESS NOTE ADULT - PROBLEM SELECTOR PLAN 1
GI bleed potentially in setting of recent EGD vs transrectal bx vs diverticulosis, potentially with symptomatic anemia given pre-syncope now s/p 1 u prbcs with inapropriate rise. No evidence of GI bleed on CT A/P  -continue with pantoprazole 40mg IV BID  -consult GI for eval-EGD today  -monitor CBC q12 UGIB  potentially from gastric tumor vs trauma from recent procedure with symptomatic anemia given pre-syncope now s/p 1 u prbcs with inapropriate rise.   -repeat CBC if appropriate rise would hold off on further prbc  No evidence of GI bleed on CT A/P  -continue with pantoprazole drip as per GI recs  -consult GI for eval-EGD today

## 2018-10-22 NOTE — H&P ADULT - ASSESSMENT
67 year-old Bulgarian-speaking male w/ pmhx of afib not on AC, gastric CA s/p resection with monthly dilations, bph presenting for GI bleed potentially in setting of recent EGD vs transrectal bx, potentially with symptomatic anemia given pre-syncope s/p 1 u prbcs. 68 year-old Citizen of [place]-speaking male w/ pmhx of afib not on AC, gastric CA s/p resection with monthly dilations, bph presenting for GI bleed potentially in setting of recent EGD vs transrectal bx, potentially with symptomatic anemia given pre-syncope s/p 1 u prbcs. 68 year-old Malagasy-speaking male  with history as above presenting for GI bleed.

## 2018-10-22 NOTE — H&P ADULT - PROBLEM SELECTOR PLAN 8
DVT: SCDs given bleed  Diet: NPO  Dispo: resolution of bleed Patient off of antihyperglycemics, will recheck a1c  -monitor off of SSI

## 2018-10-22 NOTE — H&P ADULT - NSHPOUTPATIENTPROVIDERS_GEN_ALL_CORE
GI/Referring MD: Dr. Lyle Rodriguez/ Dr. Erasmo Montoya/ Dr. Rina Chopra  PCP: Dr. Jessica Fried  Cardiologist: Dr. Yusuf Rae ([facility])/Dr. Marie  Med Onc: Dr. Prince Lemus

## 2018-10-22 NOTE — H&P ADULT - FAMILY HISTORY
No pertinent family history in first degree relatives No pertinent family history in first degree relatives, No family history significant to his current admission

## 2018-10-22 NOTE — ED ADULT NURSE REASSESSMENT NOTE - NS ED NURSE REASSESS COMMENT FT1
15 in post start  blood transfusion. Pt denies any signs of transfusion reaction such as chills, itchiness, fevers, numbness or tingling, SOB, difficulty breathing, or chest pain at this time. will continue to monitor.

## 2018-10-22 NOTE — H&P ADULT - NSHPREVIEWOFSYSTEMS_GEN_ALL_CORE
REVIEW OF SYSTEMS:  Constitutional: [ X] negative [ ] fevers [ ] chills [ ] weight loss [ ] weight gain  HEENT: [X ] negative [ ] dry eyes [ ] eye irritation [ ] postnasal drip [ ] nasal congestion  CV: [ X] negative  [ ] chest pain [ ] orthopnea [ ] palpitations [ ] murmur  Resp: [X ] negative [ ] cough [ ] shortness of breath [ ] dyspnea [ ] wheezing [ ] sputum [ ] hemoptysis  GI: [ ] negative [X ] nausea [ ] vomiting [ ] diarrhea [ ] constipation [ ] abd pain [ ] dysphagia   : [ X] negative [ ] dysuria [ ] nocturia [ ] hematuria [ ] increased urinary frequency  Musculoskeletal: [X ] negative [ ] back pain [ ] myalgias [ ] arthralgias [ ] fracture  Skin: [X ] negative [ ] rash [ ] itch  Neurological: [ ] negative [ ] headache [ ] dizziness [ ] syncope [X ] weakness [ ] numbness  Psychiatric: [ X] negative [ ] anxiety [ ] depression  Endocrine: [X ] negative [ ] diabetes [ ] thyroid problem  Hematologic/Lymphatic: [X ] negative [ ] anemia [ ] bleeding problem  Allergic/Immunologic: [X ] negative [ ] itchy eyes [ ] nasal discharge [ ] hives [ ] angioedema  [ ] All other systems negative  [ ] Unable to assess ROS because ________ REVIEW OF SYSTEMS:    CONSTITUTIONAL: +Generalized weakness/fatigue; No fevers or chills  EYES: No visual changes or eye discharge  ENT: No rhinorrhea or sore throat  NECK: No pain or stiffness  RESPIRATORY: No cough, wheezing, hemoptysis; No shortness of breath  CARDIOVASCULAR: No chest pain or palpitations; No lower extremity edema  GASTROINTESTINAL: +Acid reflux but no abd pain. No nausea, vomiting, or hematemesis; No diarrhea or constipation. +Melena/BRBPR  BACK: No back pain  GENITOURINARY: No dysuria, frequency or hematuria  NEUROLOGICAL: No numbness or weakness  SKIN: No itching, burning, rashes, or lesions

## 2018-10-22 NOTE — ED ADULT NURSE REASSESSMENT NOTE - NS ED NURSE REASSESS COMMENT FT1
Blood transfusion complete. Pt denies any signs of transfusion reaction such as chills, itchiness, numbness, fevers, SOB, difficulty breathing, etc. at this time.   Pt waiting for transport for bed upstairs. Pt offered pillows for comfort.

## 2018-10-22 NOTE — PROGRESS NOTE ADULT - SUBJECTIVE AND OBJECTIVE BOX
Patient is a 68y old  Male who presents with a chief complaint of GI bleed (22 Oct 2018 08:26)      SUBJECTIVE / OVERNIGHT EVENTS:    MEDICATIONS  (STANDING):  atorvastatin 20 milliGRAM(s) Oral at bedtime  buDESOnide 160 MICROgram(s)/formoterol 4.5 MICROgram(s) Inhaler 2 Puff(s) Inhalation two times a day  pantoprazole  Injectable 40 milliGRAM(s) IV Push once  pantoprazole Infusion 8 mG/Hr (10 mL/Hr) IV Continuous <Continuous>  sotalol 80 milliGRAM(s) Oral two times a day  tamsulosin 0.4 milliGRAM(s) Oral at bedtime  tenofovir alafenamide (VEMLIDY) 25 milliGRAM(s) Oral daily  tiotropium 18 MICROgram(s) Capsule 1 Capsule(s) Inhalation daily    MEDICATIONS  (PRN):  ALBUTerol/ipratropium for Nebulization 3 milliLiter(s) Nebulizer every 6 hours PRN Shortness of Breath and/or Wheezing        CAPILLARY BLOOD GLUCOSE        I&O's Summary      T(C): 36.5 (10-22-18 @ 05:28), Max: 36.6 (10-21-18 @ 21:10)  HR: 92 (10-22-18 @ 06:38) (88 - 100)  BP: 128/84 (10-22-18 @ 06:38) (98/55 - 132/65)  RR: 18 (10-22-18 @ 06:38) (12 - 18)  SpO2: 100% (10-22-18 @ 06:38) (97% - 100%)    PHYSICAL EXAM:  GENERAL: NAD, well-developed  HEAD:  Atraumatic, Normocephalic  EYES: EOMI, PERRLA, conjunctiva and sclera clear  NECK: Supple, No JVD  CHEST/LUNG: Clear to auscultation bilaterally; No wheeze  HEART: Regular rate and rhythm; No murmurs, rubs, or gallops  ABDOMEN: Soft, Nontender, Nondistended; Bowel sounds present  EXTREMITIES:  2+ Peripheral Pulses, No clubbing, cyanosis, or edema  PSYCH: AAOx3  NEUROLOGY: non-focal  SKIN: No rashes or lesions    LABS:                        9.9    2.95  )-----------( 110      ( 22 Oct 2018 06:30 )             30.0     10-21    142  |  107  |  23  ----------------------------<  147<H>  4.5   |  25  |  1.13    Ca    8.8      21 Oct 2018 21:50    TPro  6.3  /  Alb  3.8  /  TBili  0.4  /  DBili  x   /  AST  22  /  ALT  21  /  AlkPhos  132<H>  10-21    PT/INR - ( 21 Oct 2018 21:50 )   PT: 14.5 SEC;   INR: 1.30          PTT - ( 21 Oct 2018 21:50 )  PTT:27.4 SEC            RADIOLOGY & ADDITIONAL TESTS:    Imaging Personally Reviewed:    Consultant(s) Notes Reviewed:      Care Discussed with Consultants/Other Providers: Patient is a 68y old  Male who presents with a chief complaint of GI bleed (22 Oct 2018 08:26)      SUBJECTIVE / OVERNIGHT EVENTS:    MEDICATIONS  (STANDING):  atorvastatin 20 milliGRAM(s) Oral at bedtime  buDESOnide 160 MICROgram(s)/formoterol 4.5 MICROgram(s) Inhaler 2 Puff(s) Inhalation two times a day  pantoprazole  Injectable 40 milliGRAM(s) IV Push once  pantoprazole Infusion 8 mG/Hr (10 mL/Hr) IV Continuous <Continuous>  sotalol 80 milliGRAM(s) Oral two times a day  tamsulosin 0.4 milliGRAM(s) Oral at bedtime  tenofovir alafenamide (VEMLIDY) 25 milliGRAM(s) Oral daily  tiotropium 18 MICROgram(s) Capsule 1 Capsule(s) Inhalation daily    MEDICATIONS  (PRN):  ALBUTerol/ipratropium for Nebulization 3 milliLiter(s) Nebulizer every 6 hours PRN Shortness of Breath and/or Wheezing        CAPILLARY BLOOD GLUCOSE        I&O's Summary      T(C): 36.5 (10-22-18 @ 05:28), Max: 36.6 (10-21-18 @ 21:10)  HR: 92 (10-22-18 @ 06:38) (88 - 100)  BP: 128/84 (10-22-18 @ 06:38) (98/55 - 132/65)  RR: 18 (10-22-18 @ 06:38) (12 - 18)  SpO2: 100% (10-22-18 @ 06:38) (97% - 100%)    PHYSICAL EXAM:  GENERAL: NAD, well-developed  HEAD:  Atraumatic, Normocephalic  EYES: EOMI, PERRLA, conjunctiva and sclera clear  NECK: Supple, No JVD  CHEST/LUNG: Clear to auscultation bilaterally; No wheeze  HEART: Regular rate and rhythm; No murmurs, rubs, or gallops  ABDOMEN: Soft, Nontender, Nondistended; Bowel sounds present  EXTREMITIES:  2+ Peripheral Pulses, No clubbing, cyanosis, or edema  PSYCH: AAOx3  NEUROLOGY: non-focal  SKIN: No rashes or lesions    LABS:                        9.9    2.95  )-----------( 110      ( 22 Oct 2018 06:30 )             30.0     10-21    142  |  107  |  23  ----------------------------<  147<H>  4.5   |  25  |  1.13    Ca    8.8      21 Oct 2018 21:50    TPro  6.3  /  Alb  3.8  /  TBili  0.4  /  DBili  x   /  AST  22  /  ALT  21  /  AlkPhos  132<H>  10-21    PT/INR - ( 21 Oct 2018 21:50 )   PT: 14.5 SEC;   INR: 1.30          PTT - ( 21 Oct 2018 21:50 )  PTT:27.4 SEC            RADIOLOGY & ADDITIONAL TESTS:    Imaging Personally Reviewed: < from: CT Angio Abdomen and Pelvis w/ IV Cont (10.21.18 @ 23:59) >  EXAM:  CT ANGIO ABD PELV (W)AW IC        PROCEDURE DATE:  Oct 21 2018         INTERPRETATION:  CLINICAL INFORMATION: Bloody stools, GI bleed, history   of gastric cancer status post partial gastrectomy, strictures requiring   dilations    COMPARISON: CT chest/abdomen/pelvis 11/17/2017.    PROCEDURE:   CT of the Abdomen and Pelvis was performed with and without intravenous   contrast.  Precontrast, Arterial and Delayed phases were performed.  Intravenous contrast: 90 ml Omnipaque 350. 10 ml discarded.  Oral contrast: None.  Sagittal and coronal reformats were performed.    FINDINGS:    LOWER CHEST: Partially imaged central venous catheter with tip in the   right atrium. Unchanged 3 mm pulmonary nodule in the right lower lobe   (5:7).    LIVER: Within normal limits.  BILE DUCTS: Normal caliber.  GALLBLADDER: Within normal limits.  SPLEEN: Nonspecific scattered hypoattenuation in the spleen, likely   related to intravenous contrast bolus artifact. Splenic infarcts   considered in the differential.  PANCREAS: Within normal limits.  ADRENALS: Within normal limits.  KIDNEYS/URETERS: Within normal limits.    BLADDER: Within normal limits.  REPRODUCTIVE ORGANS: Prostatic calcifications.    BOWEL: Status post partial gastrectomy. Sigmoid diverticulosis. No bowel   obstruction. Appendix is normal.  PERITONEUM: No ascites.  VESSELS:  Atherosclerotic disease of the aorta and its branches. 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 Moderately severe narrowing of the proximal SMA (image   41 of series 602), likely related to soft tissue plaque which otherwise   appears patent distally. DONAL is patent.Distal SMA and DONAL branches are   not well assessed by CT technique.   RETROPERITONEUM: No lymphadenopathy.    ABDOMINAL WALL: Tiny supraumbilical ventral hernia.  BONES: Degenerative changes of the spine.    IMPRESSION:  No CT evidence of acute gastrointestinal bleed. Status post partial   gastrectomy. Provided history of GI bleed, consider correlation with   nuclear scan or conventional angiography if symptoms persist.     Moderately severe narrowing of the proximal SMA, likely related to soft   tissue plaque which otherwise appears patent distally.     Sigmoid diverticulosis.    Nonspecific scattered hypoattenuation in the spleen, likely related to   intravenous contrast bolus artifact. Splenic infarcts considered in the   differential.    < end of copied text >      Consultant(s) Notes Reviewed:      Care Discussed with Consultants/Other Providers: Patient is a 68y old  Male who presents with a chief complaint of GI bleed (22 Oct 2018 08:26)      SUBJECTIVE / OVERNIGHT EVENTS: Pt denies any further BM's denies CP/SOB     MEDICATIONS  (STANDING):  atorvastatin 20 milliGRAM(s) Oral at bedtime  buDESOnide 160 MICROgram(s)/formoterol 4.5 MICROgram(s) Inhaler 2 Puff(s) Inhalation two times a day  pantoprazole  Injectable 40 milliGRAM(s) IV Push once  pantoprazole Infusion 8 mG/Hr (10 mL/Hr) IV Continuous <Continuous>  sotalol 80 milliGRAM(s) Oral two times a day  tamsulosin 0.4 milliGRAM(s) Oral at bedtime  tenofovir alafenamide (VEMLIDY) 25 milliGRAM(s) Oral daily  tiotropium 18 MICROgram(s) Capsule 1 Capsule(s) Inhalation daily    MEDICATIONS  (PRN):  ALBUTerol/ipratropium for Nebulization 3 milliLiter(s) Nebulizer every 6 hours PRN Shortness of Breath and/or Wheezing        CAPILLARY BLOOD GLUCOSE        I&O's Summary      T(C): 36.5 (10-22-18 @ 05:28), Max: 36.6 (10-21-18 @ 21:10)  HR: 92 (10-22-18 @ 06:38) (88 - 100)  BP: 128/84 (10-22-18 @ 06:38) (98/55 - 132/65)  RR: 18 (10-22-18 @ 06:38) (12 - 18)  SpO2: 100% (10-22-18 @ 06:38) (97% - 100%)    PHYSICAL EXAM:  GENERAL: NAD, well-developed  HEAD:  Atraumatic, Normocephalic  EYES: EOMI, conjunctiva and sclera clear  NECK: Supple, No JVD  CHEST/LUNG: Clear to auscultation bilaterally; No wheeze  HEART: Regular rate and rhythm;  ABDOMEN: Soft, Nontender, Nondistended; Bowel sounds present  EXTREMITIES:  2+ Peripheral Pulses, No clubbing, cyanosis, or edema  PSYCH: AAOx3      LABS:                        9.9    2.95  )-----------( 110      ( 22 Oct 2018 06:30 )             30.0     10-21    142  |  107  |  23  ----------------------------<  147<H>  4.5   |  25  |  1.13    Ca    8.8      21 Oct 2018 21:50    TPro  6.3  /  Alb  3.8  /  TBili  0.4  /  DBili  x   /  AST  22  /  ALT  21  /  AlkPhos  132<H>  10-21    PT/INR - ( 21 Oct 2018 21:50 )   PT: 14.5 SEC;   INR: 1.30          PTT - ( 21 Oct 2018 21:50 )  PTT:27.4 SEC            RADIOLOGY & ADDITIONAL TESTS:    Imaging Personally Reviewed: < from: CT Angio Abdomen and Pelvis w/ IV Cont (10.21.18 @ 23:59) >  EXAM:  CT ANGIO ABD PELV (W)AW IC        PROCEDURE DATE:  Oct 21 2018         INTERPRETATION:  CLINICAL INFORMATION: Bloody stools, GI bleed, history   of gastric cancer status post partial gastrectomy, strictures requiring   dilations    COMPARISON: CT chest/abdomen/pelvis 11/17/2017.    PROCEDURE:   CT of the Abdomen and Pelvis was performed with and without intravenous   contrast.  Precontrast, Arterial and Delayed phases were performed.  Intravenous contrast: 90 ml Omnipaque 350. 10 ml discarded.  Oral contrast: None.  Sagittal and coronal reformats were performed.    FINDINGS:    LOWER CHEST: Partially imaged central venous catheter with tip in the   right atrium. Unchanged 3 mm pulmonary nodule in the right lower lobe   (5:7).    LIVER: Within normal limits.  BILE DUCTS: Normal caliber.  GALLBLADDER: Within normal limits.  SPLEEN: Nonspecific scattered hypoattenuation in the spleen, likely   related to intravenous contrast bolus artifact. Splenic infarcts   considered in the differential.  PANCREAS: Within normal limits.  ADRENALS: Within normal limits.  KIDNEYS/URETERS: Within normal limits.    BLADDER: Within normal limits.  REPRODUCTIVE ORGANS: Prostatic calcifications.    BOWEL: Status post partial gastrectomy. Sigmoid diverticulosis. No bowel   obstruction. Appendix is normal.  PERITONEUM: No ascites.  VESSELS:  Atherosclerotic disease of the aorta and its branches. 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 Moderately severe narrowing of the proximal SMA (image   41 of series 602), likely related to soft tissue plaque which otherwise   appears patent distally. DONAL is patent.Distal SMA and DONAL branches are   not well assessed by CT technique.   RETROPERITONEUM: No lymphadenopathy.    ABDOMINAL WALL: Tiny supraumbilical ventral hernia.  BONES: Degenerative changes of the spine.    IMPRESSION:  No CT evidence of acute gastrointestinal bleed. Status post partial   gastrectomy. Provided history of GI bleed, consider correlation with   nuclear scan or conventional angiography if symptoms persist.     Moderately severe narrowing of the proximal SMA, likely related to soft   tissue plaque which otherwise appears patent distally.     Sigmoid diverticulosis.    Nonspecific scattered hypoattenuation in the spleen, likely related to   intravenous contrast bolus artifact. Splenic infarcts considered in the   differential.    < end of copied text >      Consultant(s) Notes Reviewed:      Care Discussed with Consultants/Other Providers:

## 2018-10-22 NOTE — PROGRESS NOTE ADULT - ASSESSMENT
69 yo M  with history gastric ca , afib s/p watchman on ASA, gastric ca with resection with monthly dilatation, BPH, HBV on tenofovir p/w black stools. +recent dilatation 10/16 complicated by constipation baseline H/H 11-->9.9 69 yo M  with history gastric ca , afib s/p watchman on ASA, gastric ca with resection with monthly dilatation, BPH, HBV on tenofovir p/w black stools. +recent dilatation 10/16 complicated by constipation baseline H/H 11-->9.9. cta A/P NEG for source of bleed

## 2018-10-22 NOTE — CONSULT NOTE ADULT - SUBJECTIVE AND OBJECTIVE BOX
Chief Complaint:  Patient is a 68y old  Male who presents with a chief complaint of GI bleed (22 Oct 2018 05:24)    HPI:  68 year old man with hx of gastric cancer s/p resection (diagnosed 2017, requiring 4-5 dilations, most recently in 10/16 requiring stich placement), Hepatitis B, Afib s/p watchman (on ASA), COPD and hyperlipidemia presents with one day of melena and BRBPR. Patient reports he was doing relatively well since his last dilation. One day prior to admission he noted an episode of diarrhea with black stool surrounded by red stool. He refused to go to the ED despite his daughter's wishes, however another episode happened in the afternoon which was associated with dizziness and weakness prompting them to bring him to the ED. Patient denies any other complaints at this time. He denies any fevers, chills, chest pain, abdominal pain, nausea, vomiting, headache and cough. Of note, patient underwent an transrectal biopsy 3 days prior to admission. Patient reports feeling constipated after the biopsy so he took medications to help him go (does not recall name of medications)    Allergies:  sulfa drugs (Unknown)    Home Medications (patient can't recall all medications):  Omeprazole  Aripiprazole  Linaclotide  Sertraline  Memantine  Sitagliptin  Atorvastatin  Sotalol    Hospital Medications:  ALBUTerol/ipratropium for Nebulization 3 milliLiter(s) Nebulizer every 6 hours PRN  atorvastatin 20 milliGRAM(s) Oral at bedtime  buDESOnide 160 MICROgram(s)/formoterol 4.5 MICROgram(s) Inhaler 2 Puff(s) Inhalation two times a day  pantoprazole  Injectable 40 milliGRAM(s) IV Push two times a day  sotalol 80 milliGRAM(s) Oral two times a day  tamsulosin 0.4 milliGRAM(s) Oral at bedtime  tenofovir alafenamide (VEMLIDY) 25 milliGRAM(s) Oral daily  tiotropium 18 MICROgram(s) Capsule 1 Capsule(s) Inhalation daily    PMHX/PSHX:  PNA (pneumonia)  Memory change  Afib  BPH (benign prostatic hyperplasia)  Hepatitis B  COPD (chronic obstructive pulmonary disease)  Diabetes  Gastric cancer  Dyslipidemia  DVT (deep venous thrombosis)  History of cholecystectomy  History of hypertension  History of gastrectomy  S/P gastric surgery  History of embolic filter insertion  History of tonsillectomy  No significant past surgical history    Family history:  No pertinent family history in first degree relatives  Denies family history of PUD, IBD, colon cancer/polyps, stomach cancer/polyps, pancreatic cancer/masses, liver cancer/disease, breast/ovarian cancer and endometrial cancer.     Social History:   Former smoker  Denies EtoH or drug use    ROS:   General:  No fevers, chills  ENT:  No sore throat or dysphagia  CV:  No pain or palpitations  Resp:  No dyspnea, cough, wheezing  GI:  + rectal bleeding, + tarry stools, No pain, No nausea, No vomiting, No diarrhea, No constipation, No weight loss, No pruritis,   Skin:  No rash or edema      PHYSICAL EXAM:   GENERAL:  NAD, Appears stated age  HEENT:  NC/AT,  conjunctivae clear and pink, sclera -anicteric  CHEST:  CTA B/L, Normal effort  HEART:  RRR S1/S2, No murmurs  ABDOMEN:  Soft, non-tender, non-distended, normoactive bowel sounds,  no masses ,no hepato-splenomegaly, no signs of chronic liver disease  EXTEREMITIES:  No cyanosis or Edema  SKIN:  Warm & Dry. No rash or erythema  NEURO:  Alert, oriented    Vital Signs:  Vital Signs Last 24 Hrs  T(C): 36.5 (22 Oct 2018 05:28), Max: 36.6 (21 Oct 2018 21:10)  T(F): 97.7 (22 Oct 2018 05:28), Max: 97.9 (22 Oct 2018 03:55)  HR: 92 (22 Oct 2018 06:38) (88 - 100)  BP: 128/84 (22 Oct 2018 06:38) (98/55 - 132/65)  BP(mean): --  RR: 18 (22 Oct 2018 06:38) (12 - 18)  SpO2: 100% (22 Oct 2018 06:38) (97% - 100%)  Daily Height in cm: 170.18 (22 Oct 2018 05:28)    Daily     LABS:                        9.9    2.95  )-----------( 110      ( 22 Oct 2018 06:30 )             30.0     Mean Cell Volume: 92.3 fL (10-22-18 @ 06:30)    10-21    142  |  107  |  23  ----------------------------<  147<H>  4.5   |  25  |  1.13    Ca    8.8      21 Oct 2018 21:50    TPro  6.3  /  Alb  3.8  /  TBili  0.4  /  DBili  x   /  AST  22  /  ALT  21  /  AlkPhos  132<H>  10-21    LIVER FUNCTIONS - ( 21 Oct 2018 21:50 )  Alb: 3.8 g/dL / Pro: 6.3 g/dL / ALK PHOS: 132 u/L / ALT: 21 u/L / AST: 22 u/L / GGT: x           PT/INR - ( 21 Oct 2018 21:50 )   PT: 14.5 SEC;   INR: 1.30       PTT - ( 21 Oct 2018 21:50 )  PTT:27.4 SEC    Hemoglobin: 9.9 g/dL (10-22-18 @ 06:30)  Hemoglobin: 10.2 g/dL (10-21-18 @ 21:50) Chief Complaint:  Patient is a 68y old  Male who presents with a chief complaint of GI bleed (22 Oct 2018 05:24)    HPI:  68 year old man with hx of gastric cancer s/p resection (diagnosed 2017, requiring 4-5 dilations, most recently in 10/16 requiring stich placement), Hepatitis B, Afib s/p watchman (on ASA), COPD and hyperlipidemia presents with one day of melena and BRBPR. Patient reports he was doing relatively well since his last dilation. One day prior to admission he noted an episode of diarrhea with black stool surrounded by red stool. He refused to go to the ED despite his daughter's wishes, however another episode happened in the afternoon which was associated with dizziness and weakness prompting them to bring him to the ED. Patient denies any other complaints at this time. He denies any fevers, chills, chest pain, abdominal pain, nausea, vomiting, headache and cough. Of note, patient underwent an transrectal biopsy 3 days prior to admission. Patient reports feeling constipated after the biopsy so he took medications to help him go (does not recall name of medications)    Allergies:  sulfa drugs (Unknown)    Home Medications (patient can't recall all medications):  Omeprazole  Aripiprazole  Linaclotide  Sertraline  Memantine  Sitagliptin  Atorvastatin  Sotalol    Hospital Medications:  ALBUTerol/ipratropium for Nebulization 3 milliLiter(s) Nebulizer every 6 hours PRN  atorvastatin 20 milliGRAM(s) Oral at bedtime  buDESOnide 160 MICROgram(s)/formoterol 4.5 MICROgram(s) Inhaler 2 Puff(s) Inhalation two times a day  pantoprazole  Injectable 40 milliGRAM(s) IV Push two times a day  sotalol 80 milliGRAM(s) Oral two times a day  tamsulosin 0.4 milliGRAM(s) Oral at bedtime  tenofovir alafenamide (VEMLIDY) 25 milliGRAM(s) Oral daily  tiotropium 18 MICROgram(s) Capsule 1 Capsule(s) Inhalation daily    PMHX/PSHX:  PNA (pneumonia)  Memory change  Afib  BPH (benign prostatic hyperplasia)  Hepatitis B  COPD (chronic obstructive pulmonary disease)  Diabetes  Gastric cancer  Dyslipidemia  DVT (deep venous thrombosis)  History of cholecystectomy  History of hypertension  History of gastrectomy  S/P gastric surgery  History of embolic filter insertion  History of tonsillectomy  No significant past surgical history    Family history:  No pertinent family history in first degree relatives  Denies family history of PUD, IBD, colon cancer/polyps, stomach cancer/polyps, pancreatic cancer/masses, liver cancer/disease, breast/ovarian cancer and endometrial cancer.     Social History:   Former smoker  Denies EtoH or drug use    ROS:   General:  No fevers, chills  ENT:  No sore throat or dysphagia  CV:  No pain or palpitations  Resp:  No dyspnea, cough, wheezing  GI:  + rectal bleeding, + tarry stools, No pain, No nausea, No vomiting, No diarrhea, No constipation, No weight loss, No pruritis,   Skin:  No rash or edema    PHYSICAL EXAM:   GENERAL:  NAD, Appears stated age  HEENT:  NC/AT,  conjunctivae clear and pink, sclera -anicteric  CHEST:  CTA B/L, Normal effort  HEART:  RRR S1/S2, No murmurs  ABDOMEN:  Soft, non-tender, non-distended, normoactive bowel sounds  EXTEREMITIES:  No cyanosis or Edema  SKIN:  Warm & Dry.  NEURO:  Alert, oriented    Vital Signs:  Vital Signs Last 24 Hrs  T(C): 36.5 (22 Oct 2018 05:28), Max: 36.6 (21 Oct 2018 21:10)  T(F): 97.7 (22 Oct 2018 05:28), Max: 97.9 (22 Oct 2018 03:55)  HR: 92 (22 Oct 2018 06:38) (88 - 100)  BP: 128/84 (22 Oct 2018 06:38) (98/55 - 132/65)  BP(mean): --  RR: 18 (22 Oct 2018 06:38) (12 - 18)  SpO2: 100% (22 Oct 2018 06:38) (97% - 100%)  Daily Height in cm: 170.18 (22 Oct 2018 05:28)    Daily     LABS:                        9.9    2.95  )-----------( 110      ( 22 Oct 2018 06:30 )             30.0     Mean Cell Volume: 92.3 fL (10-22-18 @ 06:30)    10-21    142  |  107  |  23  ----------------------------<  147<H>  4.5   |  25  |  1.13    Ca    8.8      21 Oct 2018 21:50    TPro  6.3  /  Alb  3.8  /  TBili  0.4  /  DBili  x   /  AST  22  /  ALT  21  /  AlkPhos  132<H>  10-21    LIVER FUNCTIONS - ( 21 Oct 2018 21:50 )  Alb: 3.8 g/dL / Pro: 6.3 g/dL / ALK PHOS: 132 u/L / ALT: 21 u/L / AST: 22 u/L / GGT: x           PT/INR - ( 21 Oct 2018 21:50 )   PT: 14.5 SEC;   INR: 1.30       PTT - ( 21 Oct 2018 21:50 )  PTT:27.4 SEC    Hemoglobin: 9.9 g/dL (10-22-18 @ 06:30)  Hemoglobin: 10.2 g/dL (10-21-18 @ 21:50) Chief Complaint:  Patient is a 68y old  Male who presents with a chief complaint of GI bleed (22 Oct 2018 05:24)    HPI:  68 year old man with hx of gastric cancer s/p resection (diagnosed 2017, requiring 4-5 dilations, most recently in 10/16 requiring stitch placement), Hepatitis B, Afib s/p watchman (on ASA), COPD and hyperlipidemia presents with one day of melena and BRBPR. Patient reports he was doing relatively well since his last dilation. One day prior to admission he noted an episode of diarrhea with black stool surrounded by red stool. He refused to go to the ED despite his daughter's wishes, however another episode happened in the afternoon which was associated with dizziness and weakness prompting them to bring him to the ED. Patient denies any other complaints at this time. He denies any fevers, chills, chest pain, abdominal pain, nausea, vomiting, headache and cough. Of note, patient underwent an transrectal biopsy 3 days prior to admission. Patient reports feeling constipated after the biopsy so he took medications to help him have a bowel movement (does not recall name of medications)    Allergies:  sulfa drugs (Unknown)    Home Medications (patient can't recall all medications):  Omeprazole  Aripiprazole  Linaclotide  Sertraline  Memantine  Sitagliptin  Atorvastatin  Sotalol    Hospital Medications:  ALBUTerol/ipratropium for Nebulization 3 milliLiter(s) Nebulizer every 6 hours PRN  atorvastatin 20 milliGRAM(s) Oral at bedtime  buDESOnide 160 MICROgram(s)/formoterol 4.5 MICROgram(s) Inhaler 2 Puff(s) Inhalation two times a day  pantoprazole  Injectable 40 milliGRAM(s) IV Push two times a day  sotalol 80 milliGRAM(s) Oral two times a day  tamsulosin 0.4 milliGRAM(s) Oral at bedtime  tenofovir alafenamide (VEMLIDY) 25 milliGRAM(s) Oral daily  tiotropium 18 MICROgram(s) Capsule 1 Capsule(s) Inhalation daily    PMHX/PSHX:  PNA (pneumonia)  Memory change  Afib  BPH (benign prostatic hyperplasia)  Hepatitis B  COPD (chronic obstructive pulmonary disease)  Diabetes  Gastric cancer  Dyslipidemia  DVT (deep venous thrombosis)  History of cholecystectomy  History of hypertension  History of gastrectomy  S/P gastric surgery  History of embolic filter insertion  History of tonsillectomy  No significant past surgical history    Family history:  No pertinent family history in first degree relatives  Denies family history of PUD, IBD, colon cancer/polyps, stomach cancer/polyps, pancreatic cancer/masses, liver cancer/disease, breast/ovarian cancer and endometrial cancer.     Social History:   Former smoker  Denies EtoH or drug use    ROS:   General:  No fevers, chills  ENT:  No sore throat or dysphagia  CV:  No pain or palpitations  Resp:  No dyspnea, cough, wheezing  GI:  + rectal bleeding, + tarry stools, No pain, No nausea, No vomiting, No diarrhea, No constipation, No weight loss, No pruritis,   Skin:  No rash or edema    PHYSICAL EXAM:   GENERAL:  NAD, Appears stated age  HEENT:  NC/AT,  conjunctivae clear and pink, sclera -anicteric  CHEST:  CTA B/L, Normal effort  HEART:  RRR S1/S2, No murmurs  ABDOMEN:  Soft, non-tender, non-distended, normoactive bowel sounds  EXTEREMITIES:  No cyanosis or Edema  SKIN:  Warm & Dry.  NEURO:  Alert, oriented    Vital Signs:  Vital Signs Last 24 Hrs  T(C): 36.5 (22 Oct 2018 05:28), Max: 36.6 (21 Oct 2018 21:10)  T(F): 97.7 (22 Oct 2018 05:28), Max: 97.9 (22 Oct 2018 03:55)  HR: 92 (22 Oct 2018 06:38) (88 - 100)  BP: 128/84 (22 Oct 2018 06:38) (98/55 - 132/65)  BP(mean): --  RR: 18 (22 Oct 2018 06:38) (12 - 18)  SpO2: 100% (22 Oct 2018 06:38) (97% - 100%)  Daily Height in cm: 170.18 (22 Oct 2018 05:28)    Daily     LABS:                        9.9    2.95  )-----------( 110      ( 22 Oct 2018 06:30 )             30.0     Mean Cell Volume: 92.3 fL (10-22-18 @ 06:30)    10-21    142  |  107  |  23  ----------------------------<  147<H>  4.5   |  25  |  1.13    Ca    8.8      21 Oct 2018 21:50    TPro  6.3  /  Alb  3.8  /  TBili  0.4  /  DBili  x   /  AST  22  /  ALT  21  /  AlkPhos  132<H>  10-21    LIVER FUNCTIONS - ( 21 Oct 2018 21:50 )  Alb: 3.8 g/dL / Pro: 6.3 g/dL / ALK PHOS: 132 u/L / ALT: 21 u/L / AST: 22 u/L / GGT: x           PT/INR - ( 21 Oct 2018 21:50 )   PT: 14.5 SEC;   INR: 1.30       PTT - ( 21 Oct 2018 21:50 )  PTT:27.4 SEC    Hemoglobin: 9.9 g/dL (10-22-18 @ 06:30)  Hemoglobin: 10.2 g/dL (10-21-18 @ 21:50)

## 2018-10-22 NOTE — H&P ADULT - PROBLEM SELECTOR PLAN 5
Not on home O2.   -continue home budesonide, tiotropium  -duoneb prn Has mod-severe SMA narrowing on CT ab  -will check lipid, likely needs high dose of statin

## 2018-10-22 NOTE — H&P ADULT - PROBLEM SELECTOR PLAN 3
Patient with rate controlled afib  -continue home medicaitons, sotalol EKG qd, no prolongation currently Patient with rate controlled afib  -continue home medications, sotalol EKG qd, no prolongation currently

## 2018-10-23 ENCOUNTER — TRANSCRIPTION ENCOUNTER (OUTPATIENT)
Age: 68
End: 2018-10-23

## 2018-10-23 VITALS
DIASTOLIC BLOOD PRESSURE: 53 MMHG | OXYGEN SATURATION: 99 % | HEART RATE: 64 BPM | SYSTOLIC BLOOD PRESSURE: 124 MMHG | TEMPERATURE: 98 F | RESPIRATION RATE: 17 BRPM

## 2018-10-23 LAB
BUN SERPL-MCNC: 18 MG/DL — SIGNIFICANT CHANGE UP (ref 7–23)
CALCIUM SERPL-MCNC: 8.9 MG/DL — SIGNIFICANT CHANGE UP (ref 8.4–10.5)
CHLORIDE SERPL-SCNC: 106 MMOL/L — SIGNIFICANT CHANGE UP (ref 98–107)
CO2 SERPL-SCNC: 25 MMOL/L — SIGNIFICANT CHANGE UP (ref 22–31)
CREAT SERPL-MCNC: 1.02 MG/DL — SIGNIFICANT CHANGE UP (ref 0.5–1.3)
GLUCOSE SERPL-MCNC: 94 MG/DL — SIGNIFICANT CHANGE UP (ref 70–99)
HBA1C BLD-MCNC: 5.3 % — SIGNIFICANT CHANGE UP (ref 4–5.6)
HCT VFR BLD CALC: 29.4 % — LOW (ref 39–50)
HGB BLD-MCNC: 9.9 G/DL — LOW (ref 13–17)
MCHC RBC-ENTMCNC: 30.6 PG — SIGNIFICANT CHANGE UP (ref 27–34)
MCHC RBC-ENTMCNC: 33.7 % — SIGNIFICANT CHANGE UP (ref 32–36)
MCV RBC AUTO: 90.7 FL — SIGNIFICANT CHANGE UP (ref 80–100)
NRBC # FLD: 0 — SIGNIFICANT CHANGE UP
PLATELET # BLD AUTO: 130 K/UL — LOW (ref 150–400)
PMV BLD: 11.1 FL — SIGNIFICANT CHANGE UP (ref 7–13)
POTASSIUM SERPL-MCNC: 4.2 MMOL/L — SIGNIFICANT CHANGE UP (ref 3.5–5.3)
POTASSIUM SERPL-SCNC: 4.2 MMOL/L — SIGNIFICANT CHANGE UP (ref 3.5–5.3)
RBC # BLD: 3.24 M/UL — LOW (ref 4.2–5.8)
RBC # FLD: 14.6 % — HIGH (ref 10.3–14.5)
SODIUM SERPL-SCNC: 142 MMOL/L — SIGNIFICANT CHANGE UP (ref 135–145)
WBC # BLD: 3.38 K/UL — LOW (ref 3.8–10.5)
WBC # FLD AUTO: 3.38 K/UL — LOW (ref 3.8–10.5)

## 2018-10-23 PROCEDURE — 99239 HOSP IP/OBS DSCHRG MGMT >30: CPT

## 2018-10-23 PROCEDURE — 99232 SBSQ HOSP IP/OBS MODERATE 35: CPT | Mod: GC

## 2018-10-23 RX ORDER — BUDESONIDE AND FORMOTEROL FUMARATE DIHYDRATE 160; 4.5 UG/1; UG/1
2 AEROSOL RESPIRATORY (INHALATION)
Qty: 0 | Refills: 0 | COMMUNITY

## 2018-10-23 RX ORDER — MESALAMINE 400 MG
1 TABLET, DELAYED RELEASE (ENTERIC COATED) ORAL
Qty: 0 | Refills: 0 | COMMUNITY

## 2018-10-23 RX ORDER — CYCLOSPORINE 0.5 MG/ML
1 EMULSION OPHTHALMIC
Qty: 0 | Refills: 0 | COMMUNITY

## 2018-10-23 RX ORDER — SUCRALFATE 1 G
10 TABLET ORAL
Qty: 0 | Refills: 0 | COMMUNITY

## 2018-10-23 RX ORDER — DEXLANSOPRAZOLE 30 MG/1
1 CAPSULE, DELAYED RELEASE ORAL
Qty: 0 | Refills: 0 | COMMUNITY

## 2018-10-23 RX ORDER — NYSTATIN/TRIAMCINOLONE ACET
1 OINTMENT (GRAM) TOPICAL
Qty: 0 | Refills: 0 | COMMUNITY

## 2018-10-23 RX ORDER — ASPIRIN/CALCIUM CARB/MAGNESIUM 324 MG
1 TABLET ORAL
Qty: 0 | Refills: 0 | COMMUNITY

## 2018-10-23 RX ORDER — TAMSULOSIN HYDROCHLORIDE 0.4 MG/1
1 CAPSULE ORAL
Qty: 0 | Refills: 0 | COMMUNITY

## 2018-10-23 RX ORDER — SUCRALFATE 1 G
10 TABLET ORAL
Qty: 1200 | Refills: 0 | OUTPATIENT
Start: 2018-10-23 | End: 2018-11-21

## 2018-10-23 RX ORDER — MIRTAZAPINE 45 MG/1
1 TABLET, ORALLY DISINTEGRATING ORAL
Qty: 0 | Refills: 0 | COMMUNITY

## 2018-10-23 RX ORDER — PANTOPRAZOLE SODIUM 20 MG/1
1 TABLET, DELAYED RELEASE ORAL
Qty: 0 | Refills: 0 | COMMUNITY
Start: 2018-10-23

## 2018-10-23 RX ORDER — ATORVASTATIN CALCIUM 80 MG/1
1 TABLET, FILM COATED ORAL
Qty: 0 | Refills: 0 | COMMUNITY

## 2018-10-23 RX ORDER — MEMANTINE HYDROCHLORIDE 10 MG/1
1 TABLET ORAL
Qty: 0 | Refills: 0 | COMMUNITY

## 2018-10-23 RX ORDER — TIOTROPIUM BROMIDE 18 UG/1
1 CAPSULE ORAL; RESPIRATORY (INHALATION)
Qty: 0 | Refills: 0 | COMMUNITY

## 2018-10-23 RX ORDER — ARIPIPRAZOLE 15 MG/1
1 TABLET ORAL
Qty: 0 | Refills: 0 | COMMUNITY

## 2018-10-23 RX ORDER — SERTRALINE 25 MG/1
0.5 TABLET, FILM COATED ORAL
Qty: 0 | Refills: 0 | COMMUNITY

## 2018-10-23 RX ORDER — DICLOFENAC SODIUM 30 MG/G
1 GEL TOPICAL
Qty: 0 | Refills: 0 | COMMUNITY

## 2018-10-23 RX ORDER — AZELASTINE 137 UG/1
2 SPRAY, METERED NASAL
Qty: 0 | Refills: 0 | COMMUNITY

## 2018-10-23 RX ORDER — LIPASE/PROTEASE/AMYLASE 16-48-48K
1 CAPSULE,DELAYED RELEASE (ENTERIC COATED) ORAL
Qty: 0 | Refills: 0 | COMMUNITY

## 2018-10-23 RX ORDER — ENTECAVIR 0.5 MG/1
1 TABLET ORAL
Qty: 0 | Refills: 0 | COMMUNITY

## 2018-10-23 RX ORDER — SITAGLIPTIN 50 MG/1
1 TABLET, FILM COATED ORAL
Qty: 0 | Refills: 0 | COMMUNITY

## 2018-10-23 RX ORDER — SOTALOL HCL 120 MG
1 TABLET ORAL
Qty: 0 | Refills: 0 | COMMUNITY

## 2018-10-23 RX ORDER — PANTOPRAZOLE SODIUM 20 MG/1
1 TABLET, DELAYED RELEASE ORAL
Qty: 30 | Refills: 0 | OUTPATIENT
Start: 2018-10-23 | End: 2018-11-21

## 2018-10-23 RX ORDER — TENOFOVIR DISOPROXIL FUMARATE 300 MG/1
1 TABLET, FILM COATED ORAL
Qty: 0 | Refills: 0 | COMMUNITY

## 2018-10-23 RX ORDER — ERGOCALCIFEROL 1.25 MG/1
1 CAPSULE ORAL
Qty: 0 | Refills: 0 | COMMUNITY

## 2018-10-23 RX ADMIN — Medication 80 MILLIGRAM(S): at 05:22

## 2018-10-23 RX ADMIN — PANTOPRAZOLE SODIUM 40 MILLIGRAM(S): 20 TABLET, DELAYED RELEASE ORAL at 05:22

## 2018-10-23 RX ADMIN — BUDESONIDE AND FORMOTEROL FUMARATE DIHYDRATE 2 PUFF(S): 160; 4.5 AEROSOL RESPIRATORY (INHALATION) at 08:58

## 2018-10-23 RX ADMIN — Medication 1 GRAM(S): at 12:41

## 2018-10-23 RX ADMIN — Medication 1 GRAM(S): at 05:22

## 2018-10-23 RX ADMIN — TIOTROPIUM BROMIDE 1 CAPSULE(S): 18 CAPSULE ORAL; RESPIRATORY (INHALATION) at 08:58

## 2018-10-23 NOTE — DISCHARGE NOTE ADULT - CARE PROVIDER_API CALL
Darren Levy), Gastroenterology; Internal Medicine  [address] [place] [address]  Phone: [phone]  Fax: [phone]

## 2018-10-23 NOTE — PROGRESS NOTE ADULT - SUBJECTIVE AND OBJECTIVE BOX
Patient is a 68y old  Male who presents with a chief complaint of GI bleed (23 Oct 2018 09:53)      SUBJECTIVE / OVERNIGHT EVENTS: Pt denies any BM. denies N/V    MEDICATIONS  (STANDING):  atorvastatin 20 milliGRAM(s) Oral at bedtime  buDESOnide 160 MICROgram(s)/formoterol 4.5 MICROgram(s) Inhaler 2 Puff(s) Inhalation two times a day  pantoprazole    Tablet 40 milliGRAM(s) Oral before breakfast  sotalol 80 milliGRAM(s) Oral two times a day  sucralfate suspension 1 Gram(s) Oral four times a day  tamsulosin 0.4 milliGRAM(s) Oral at bedtime  tenofovir alafenamide (VEMLIDY) 25 milliGRAM(s) Oral daily  tiotropium 18 MICROgram(s) Capsule 1 Capsule(s) Inhalation daily    MEDICATIONS  (PRN):  ALBUTerol/ipratropium for Nebulization 3 milliLiter(s) Nebulizer every 6 hours PRN Shortness of Breath and/or Wheezing        CAPILLARY BLOOD GLUCOSE      POCT Blood Glucose.: 110 mg/dL (23 Oct 2018 08:26)  POCT Blood Glucose.: 135 mg/dL (22 Oct 2018 21:51)  POCT Blood Glucose.: 82 mg/dL (22 Oct 2018 18:57)    I&O's Summary      T(C): 36.9 (10-23-18 @ 05:21), Max: 36.9 (10-23-18 @ 05:21)  HR: 88 (10-23-18 @ 05:21) (88 - 98)  BP: 113/74 (10-23-18 @ 05:21) (103/97 - 113/74)  RR: 16 (10-23-18 @ 05:21) (16 - 18)  SpO2: 100% (10-23-18 @ 05:21) (100% - 100%)    PHYSICAL EXAM:  GENERAL: NAD, well-developed  HEAD:  Atraumatic, Normocephalic  EYES: EOMI, PERRLA, conjunctiva and sclera clear  NECK: Supple, No JVD  CHEST/LUNG: Clear to auscultation bilaterally; No wheeze  HEART: Regular rate and rhythm; No murmurs, rubs, or gallops  ABDOMEN: Soft, Nontender, Nondistended; Bowel sounds present  EXTREMITIES:  2+ Peripheral Pulses, No clubbing, cyanosis, or edema  PSYCH: AAOx3    LABS:                        9.9    3.38  )-----------( 130      ( 23 Oct 2018 00:44 )             29.4     10-23    142  |  106  |  18  ----------------------------<  94  4.2   |  25  |  1.02    Ca    8.9      23 Oct 2018 05:45    TPro  6.3  /  Alb  3.8  /  TBili  0.4  /  DBili  x   /  AST  22  /  ALT  21  /  AlkPhos  132<H>  10-21    PT/INR - ( 21 Oct 2018 21:50 )   PT: 14.5 SEC;   INR: 1.30          PTT - ( 21 Oct 2018 21:50 )  PTT:27.4 SEC            RADIOLOGY & ADDITIONAL TESTS:    Imaging Personally Reviewed:< from: Upper Endoscopy (10.22.18 @ 16:25) >  Findings:       The oropharynx was normal.       One esophageal ulcer with no bleeding and no stigmata of recent bleeding        was found 40 cm from the incisors.       The exam of the esophagus was otherwise normal. Bilious contents were        seen refluxing intho the esophagus.       Evidence of a proximal gastrectomy was seen. A blind pouch parralel to        the gastric lumen was seen at the area of the gastroesophageal        anastomosis. The antrum appeared intact and a gastroenterostomy was        found there. This was characterized by visible sutures.       The examined small bowel (duodenum or jejunum).       One esophageal ulcer and no stigmata of recent bleeding was found.The        lesion was 1 mm in largest dimension. Biopsies were taken with a cold        forceps for histology.                                                                                   Impression:          - Non-bleeding esophageal ulcer, likely the source of                        the patient's bleeding, possibly due to bile acid reflux.                       - A proximal gastrectomy was seen, with a blind pouch                        parallel to the gastric lumen.  Recommendation:      - [facility] patient to [facility] for [facility] care.                       - Use sucralfate suspension 1 gram 4 times per day    < end of copied text >      Consultant(s) Notes Reviewed:      Care Discussed with Consultants/Other Providers:

## 2018-10-23 NOTE — DISCHARGE NOTE ADULT - PATIENT PORTAL LINK FT
You can access the [facility] Portal, offered by [facility], by registering with the following website: [email or web address]

## 2018-10-23 NOTE — PROGRESS NOTE ADULT - ASSESSMENT
Impression:  # Anemia with GI Bleeding (acute posthemorrhagic anemia): Hemoglobin of 9.9 on admission and is stable today. The source is likely the esophageal ulcer seen on EGD yesterday. This appears to be healing and without high risk stigmata.    # Status post proximal gastrectomy: it was difficult to delineate the surgical anatomy on endocopy and a blind gastric pouch was noted    #Likely bile acid reflux    #afib s/p Watchman procedure, on ASA    Recommendations:  - sucralfate suspension 1 gram QID  - no further inpatient GI workup planned  -  no GI team objection to the patient's home ASA, avoid other NSAIDs Impression:  # Anemia with GI Bleeding (acute posthemorrhagic anemia): Hemoglobin of 9.9 on admission and is stable today. The source is likely the esophageal ulcer seen on EGD yesterday. This appears to be healing and without high risk stigmata.    # Status post proximal gastrectomy: it was difficult to delineate the surgical anatomy on endocopy and a blind gastric pouch was noted    #Likely bile acid reflux    #afib s/p Watchman procedure, on ASA    Recommendations:  - sucralfate suspension 1 gram QID  - no further inpatient GI workup planned  -  no GI team objection to the patient's home ASA, avoid other NSAIDs  - repeat EGD in 8 weeks to assess ulcer healing Impression:  # Anemia with GI Bleeding (acute posthemorrhagic anemia): Hemoglobin of 9.9 on admission and is stable today. The source is likely the esophageal ulcer seen on EGD yesterday which may have been exacerbated by recent dilatation. This appears to be healing and without high risk stigmata.    # Status post proximal gastrectomy: it was difficult to delineate the surgical anatomy on endocopy and a blind gastric pouch was noted    #Likely bile acid reflux    #afib s/p Watchman procedure, on ASA    Recommendations:  - sucralfate suspension 1 gram QID to adsorb bile reflux plus daily PPI  - no further inpatient GI workup planned  -  no GI team objection to the patient's home ASA, avoid other NSAIDs  - repeat EGD in 8 weeks to assess ulcer healing

## 2018-10-23 NOTE — DISCHARGE NOTE ADULT - HOSPITAL COURSE
Dx: GI bleed  67 year-old [place]ger-speaking male w/ pmhx of afib not on AC, gastric CA s/p resection with monthly dilations, bph presenting for GI bleed potentially in setting of recent EGD vs transrectal bx, potentially with symptomatic anemia given pre-syncope s/p 1 u prbcs.    Hospital course:    GI bleed.   -GI consulted for potential gastric ca ulcer rebleed  -continue with pantoprazole 40mg IV BID  -s/p 1 u prbc  -pantoprazole gtt - changed to po as tolerating diet  -trend cbc q12, no active signs of bleeding currently   -S/p EGD: esophageal ulcer - resume usual diet  -Carafate suspension QID    Gastric cancer.    -LN+ gastric ca, s/p resection and neoadjuvant potentially leading to current presentation.  -plan as above.     Afib.    -Patient with rate controlled afib  -continue home medicaitons, sotalol EKG qd, no prolongation currently.     BPH (benign prostatic hyperplasia).    -Continue home tamsulosin.     Dyslipidemia.   -Has mod-severe SMA narrowing on CT ab  -lipid panel - wnl    COPD (chronic obstructive pulmonary disease).    -Not on home O2.   -continue home budesonide, tiotropium  -duoneb prn.     Hepatitis B.   -Continue home tenofovir.    Diabetes.    -Patient off of antihyperglycemics  -will recheck a1c---  -monitor off of SSI.     Need for prophylactic measure.    -DVT: SCDs given bleed  -Diet: NPO  -Dispo: resolution of bleed.     Dispo- stable for discharge home.  Follow up with GI in 8 weeks for repeat EGD

## 2018-10-23 NOTE — PROGRESS NOTE ADULT - SUBJECTIVE AND OBJECTIVE BOX
Patient is a 68y old  Male who presents with a chief complaint of GI bleed (22 Oct 2018 12:02)      SUBJECTIVE / OVERNIGHT EVENTS:  The patient denies any bowel movements overnight.  MEDICATIONS  (STANDING):  atorvastatin 20 milliGRAM(s) Oral at bedtime  buDESOnide 160 MICROgram(s)/formoterol 4.5 MICROgram(s) Inhaler 2 Puff(s) Inhalation two times a day  pantoprazole    Tablet 40 milliGRAM(s) Oral before breakfast  sotalol 80 milliGRAM(s) Oral two times a day  sucralfate suspension 1 Gram(s) Oral four times a day  tamsulosin 0.4 milliGRAM(s) Oral at bedtime  tenofovir alafenamide (VEMLIDY) 25 milliGRAM(s) Oral daily  tiotropium 18 MICROgram(s) Capsule 1 Capsule(s) Inhalation daily    MEDICATIONS  (PRN):  ALBUTerol/ipratropium for Nebulization 3 milliLiter(s) Nebulizer every 6 hours PRN Shortness of Breath and/or Wheezing              PHYSICAL EXAM:  GENERAL: NAD, well-developed  HEAD:  Atraumatic, Normocephalic  EYES: EOMI, PERRLA, conjunctiva and sclera anicteric  NECK: Supple, No JVD  CHEST/LUNG: Clear to auscultation bilaterally; No wheeze  HEART: Regular rate and rhythm; No murmurs, rubs, or gallops  ABDOMEN: Soft, Nontender, Nondistended; Bowel sounds present, no hepatosplenomegaly, no rebound or guarding  EXTREMITIES:  2+ Peripheral Pulses, No clubbing, cyanosis, or edema  PSYCH: AAOx3  NEUROLOGY: non-focal, no asterixis  SKIN: No rashes or lesion    LABS:                        9.9    3.38  )-----------( 130      ( 23 Oct 2018 00:44 )             29.4     10-23    142  |  106  |  18  ----------------------------<  94  4.2   |  25  |  1.02    Ca    8.9      23 Oct 2018 05:45    TPro  6.3  /  Alb  3.8  /  TBili  0.4  /  DBili  x   /  AST  22  /  ALT  21  /  AlkPhos  132<H>  10-21    LIVER FUNCTIONS - ( 21 Oct 2018 21:50 )  Alb: 3.8 g/dL / Pro: 6.3 g/dL / ALK PHOS: 132 u/L / ALT: 21 u/L / AST: 22 u/L / GGT: x           PT/INR - ( 21 Oct 2018 21:50 )   PT: 14.5 SEC;   INR: 1.30          PTT - ( 21 Oct 2018 21:50 )  PTT:27.4 SEC          RADIOLOGY & ADDITIONAL TESTS:

## 2018-10-23 NOTE — PROGRESS NOTE ADULT - ASSESSMENT
67 yo M  with history gastric ca , afib s/p watchman on ASA, gastric ca with resection with monthly dilatation, BPH, HBV on tenofovir p/w black stools. +recent dilatation 10/16 complicated by constipation baseline H/H 11-->9.9. cta A/P NEG for source of bleed

## 2018-10-23 NOTE — DISCHARGE NOTE ADULT - MEDICATION SUMMARY - MEDICATIONS TO STOP TAKING
I will STOP taking the medications listed below when I get home from the hospital:    Creon 24,000 units oral delayed release capsule  -- 1 cap(s) by mouth 2 times a day    RESTASIS 0.05% EYE EMULSION    DEXILANT DR 60 MG CAPSULE    DILTIAZEM 24HR  MG CAP    ENTECAVIR 0.5 MG TABLET    MONTELUKAST SOD 10 MG TABLET    SOTALOL 80 MG TABLET  -- orally 2 times a day    Augmentin 500 mg-125 mg oral tablet  -- 1 tab(s) by mouth 2 times a day   -- Finish all this medication unless otherwise directed by prescriber.  Take with food or milk.    Flagyl 500 mg oral tablet  -- 1 tab(s) by mouth 3 times a day   -- Do not drink alcoholic beverages when taking this medication.  Finish all this medication unless otherwise directed by prescriber.  May discolor urine or feces.    Vemlidy 25 mg oral tablet  -- 1 tab(s) by mouth once a day    Xifaxan 550 mg oral tablet  -- 1 tab(s) by mouth 3 times a day    sotalol AF 80 mg oral tablet  -- 1 tab(s) by mouth 2 times a day    Dexilant 60 mg oral delayed release capsule  -- 1 cap(s) by mouth once a day    Creon 24,000 units oral delayed release capsule  -- 1 cap(s) by mouth 2 times a day (with meals)    Voltaren 1% topical gel  -- Apply on skin to affected area 3 times a day, As Needed    Canasa 1000 mg rectal suppository  -- 1 suppository(ies) rectally once a day (at bedtime)    azelastine 137 mcg/inh (0.1%) nasal spray  -- 2 spray(s) into nose 2 times a day    ARIPiprazole 5 mg oral tablet  -- 1 tab(s) by mouth once a day    entecavir 0.5 mg oral tablet  -- 1 tab(s) by mouth once a day    mirtazapine 7.5 mg oral tablet  -- 1 tab(s) by mouth once a day (at bedtime)    nystatin-triamcinolone 100,000 units/g-0.1% topical cream  -- Apply on skin to affected area 2 times a day

## 2018-10-23 NOTE — DISCHARGE NOTE ADULT - CARE PLAN
Principal Discharge DX:	GI bleed  Goal:	Resolved  Assessment and plan of treatment:	You had an upper Endoscopy which revealed a non bleeding esophageal ulcer.  You will need to follow up with Gastroenterology in 8 weeks for follow up Upper Endoscopy.  Continue Sucralate to help helping process.  Secondary Diagnosis:	BPH (benign prostatic hyperplasia)  Assessment and plan of treatment:	Continue Flomax as prescribed.  Secondary Diagnosis:	COPD (chronic obstructive pulmonary disease)  Assessment and plan of treatment:	Follow up with your pulmonologist within 2 weeks of discharge.  Secondary Diagnosis:	Diabetes  Assessment and plan of treatment:	Monitor blood sugars as recommended.    Eat a consistent carbohydrate diet.

## 2018-10-23 NOTE — DISCHARGE NOTE ADULT - PLAN OF CARE
Resolved You had an upper Endoscopy which revealed a non bleeding esophageal ulcer.  You will need to follow up with Gastroenterology in 8 weeks for follow up Upper Endoscopy.  Continue Sucralate to help helping process. Continue Flomax as prescribed. Follow up with your pulmonologist within 2 weeks of discharge. Monitor blood sugars as recommended.    Eat a consistent carbohydrate diet.

## 2018-10-23 NOTE — DISCHARGE NOTE ADULT - MEDICATION SUMMARY - MEDICATIONS TO TAKE
I will START or STAY ON the medications listed below when I get home from the hospital:    aspirin 81 mg oral delayed release tablet  -- 1 tab(s) by mouth once a day  -- Indication: For Cad    Flomax 0.4 mg oral capsule  -- 1 cap(s) by mouth 2 times a day  -- Indication: For BPH (benign prostatic hyperplasia)    sotalol AF 80 mg oral tablet  -- 1 tab(s) by mouth 2 times a day  -- Indication: For Antiarrythmic    Zoloft 50 mg oral tablet  -- 0.5 tab(s) (25mg) by mouth once a day  -- Indication: For Depression    Januvia 100 mg oral tablet  -- 1 tab(s) by mouth once a day  -- Indication: For DM    atorvastatin 20 mg oral tablet  -- 1 tab(s) by mouth once a day  -- Indication: For HLD    Vemlidy 25 mg oral tablet  -- 1 tab(s) by mouth once a day  -- Indication: For Hepatitis B    Symbicort 160 mcg-4.5 mcg/inh inhalation aerosol  -- 2 puff(s) inhaled 2 times a day  -- Indication: For COPD (chronic obstructive pulmonary disease)    Spiriva 18 mcg inhalation capsule  -- 1 cap(s) inhaled once a day  -- Indication: For COPD (chronic obstructive pulmonary disease)    Namenda XR 28 mg oral capsule, extended release  -- 1 cap(s) by mouth once a day  -- Indication: For Dementia    Carafate 1 g/10 mL oral suspension  -- 10 milliliter(s) by mouth 4 times a day (before meals and at bedtime)  -- Indication: For ulcers    pantoprazole 40 mg oral delayed release tablet  -- 1 tab(s) by mouth once a day (before a meal)  -- Indication: For Gerd

## 2018-10-23 NOTE — PROGRESS NOTE ADULT - ATTENDING COMMENTS
I have seen and evaluated the patient with the GI Fellow and GI Team.  I agree with the findings, formulation and plan of care as documented in the  fellow's note, except as noted.
discharge if tolerating diet 37 min

## 2018-10-24 LAB — SURGICAL PATHOLOGY STUDY: SIGNIFICANT CHANGE UP

## 2019-06-18 ENCOUNTER — EMERGENCY (EMERGENCY)
Facility: HOSPITAL | Age: 69
LOS: 1 days | Discharge: HOME | End: 2019-06-18
Attending: EMERGENCY MEDICINE
Payer: MEDICARE

## 2019-06-18 VITALS
OXYGEN SATURATION: 98 % | DIASTOLIC BLOOD PRESSURE: 89 MMHG | HEART RATE: 84 BPM | WEIGHT: 160.06 LBS | TEMPERATURE: 98 F | HEIGHT: 67 IN | RESPIRATION RATE: 16 BRPM | SYSTOLIC BLOOD PRESSURE: 126 MMHG

## 2019-06-18 DIAGNOSIS — Z90.3 ACQUIRED ABSENCE OF STOMACH [PART OF]: Chronic | ICD-10-CM

## 2019-06-18 DIAGNOSIS — Z90.89 ACQUIRED ABSENCE OF OTHER ORGANS: Chronic | ICD-10-CM

## 2019-06-18 DIAGNOSIS — Z98.890 OTHER SPECIFIED POSTPROCEDURAL STATES: Chronic | ICD-10-CM

## 2019-06-18 PROCEDURE — 99283 EMERGENCY DEPT VISIT LOW MDM: CPT | Mod: 25

## 2019-06-18 PROCEDURE — 99283 EMERGENCY DEPT VISIT LOW MDM: CPT

## 2019-06-19 RX ORDER — SODIUM CHLORIDE 9 MG/ML
3 INJECTION INTRAMUSCULAR; INTRAVENOUS; SUBCUTANEOUS ONCE
Refills: 0 | Status: DISCONTINUED | OUTPATIENT
Start: 2019-06-19 | End: 2019-06-19

## 2019-06-19 RX ORDER — TRANEXAMIC ACID 100 MG/ML
5 INJECTION, SOLUTION INTRAVENOUS ONCE
Refills: 0 | Status: DISCONTINUED | OUTPATIENT
Start: 2019-06-19 | End: 2019-06-19

## 2019-06-19 NOTE — ED PROVIDER NOTE - CLINICAL SUMMARY MEDICAL DECISION MAKING FREE TEXT BOX
69 M with gingival bleeding after dental implant x today. Currently with no bleeding. Discussed with pt about obtaining bloodwork to check H&H and platelet count. Pt declined and preferred to go home and f/u with his dentist tomorrow. Will d/c home.

## 2019-06-19 NOTE — ED PROVIDER NOTE - OBJECTIVE STATEMENT
70 y/o male with PMHx of A-Fib (on ASA, not on anticoags), COPD, DM presents to the ED c/o dental bleeding s/p dental implant procedure x today. Pt notes today at 4PM, pt had 2 dental implants placed in his L lower jaw. Pt notes after procedure, persistent bleeding from the site of the implant. In ED, bleeding has stopped. Pt denies fever, chills, or any other complaints. No Hx bleeding disorder. Pt notes he stopped taking ASA x 2 days ago prior to dental procedure. Pt allergic to Sulfa drugs (unknown).

## 2019-10-14 NOTE — ASU PREOP CHECKLIST - BETA DATE TIME
04-Sep-2017 22:00 Complex Repair And V-Y Plasty Text: The defect edges were debeveled with a #15 scalpel blade.  The primary defect was closed partially with a complex linear closure.  Given the location of the remaining defect, shape of the defect and the proximity to free margins a V-Y plasty was deemed most appropriate for complete closure of the defect.  Using a sterile surgical marker, an appropriate advancement flap was drawn incorporating the defect and placing the expected incisions within the relaxed skin tension lines where possible.    The area thus outlined was incised deep to adipose tissue with a #15 scalpel blade.  The skin margins were undermined to an appropriate distance in all directions utilizing iris scissors.

## 2020-07-11 ENCOUNTER — APPOINTMENT (OUTPATIENT)
Dept: DISASTER EMERGENCY | Facility: CLINIC | Age: 70
End: 2020-07-11

## 2020-07-11 DIAGNOSIS — Z01.818 ENCOUNTER FOR OTHER PREPROCEDURAL EXAMINATION: ICD-10-CM

## 2020-07-13 LAB — SARS-COV-2 N GENE NPH QL NAA+PROBE: NOT DETECTED

## 2020-07-14 ENCOUNTER — OUTPATIENT (OUTPATIENT)
Dept: OUTPATIENT SERVICES | Facility: HOSPITAL | Age: 70
LOS: 1 days | Discharge: HOME | End: 2020-07-14
Payer: MEDICARE

## 2020-07-14 ENCOUNTER — RESULT REVIEW (OUTPATIENT)
Age: 70
End: 2020-07-14

## 2020-07-14 ENCOUNTER — APPOINTMENT (OUTPATIENT)
Dept: GASTROENTEROLOGY | Facility: HOSPITAL | Age: 70
End: 2020-07-14

## 2020-07-14 VITALS
TEMPERATURE: 97 F | WEIGHT: 171.96 LBS | DIASTOLIC BLOOD PRESSURE: 77 MMHG | SYSTOLIC BLOOD PRESSURE: 106 MMHG | HEART RATE: 75 BPM | OXYGEN SATURATION: 99 %

## 2020-07-14 VITALS
OXYGEN SATURATION: 100 % | DIASTOLIC BLOOD PRESSURE: 91 MMHG | HEART RATE: 88 BPM | SYSTOLIC BLOOD PRESSURE: 144 MMHG | RESPIRATION RATE: 15 BRPM

## 2020-07-14 DIAGNOSIS — Z90.3 ACQUIRED ABSENCE OF STOMACH [PART OF]: Chronic | ICD-10-CM

## 2020-07-14 DIAGNOSIS — Z98.890 OTHER SPECIFIED POSTPROCEDURAL STATES: Chronic | ICD-10-CM

## 2020-07-14 DIAGNOSIS — Z90.89 ACQUIRED ABSENCE OF OTHER ORGANS: Chronic | ICD-10-CM

## 2020-07-14 DIAGNOSIS — K21.9 GASTRO-ESOPHAGEAL REFLUX DISEASE WITHOUT ESOPHAGITIS: ICD-10-CM

## 2020-07-14 PROCEDURE — 88341 IMHCHEM/IMCYTCHM EA ADD ANTB: CPT | Mod: 26

## 2020-07-14 PROCEDURE — 88342 IMHCHEM/IMCYTCHM 1ST ANTB: CPT | Mod: 26

## 2020-07-14 PROCEDURE — 43242 EGD US FINE NEEDLE BX/ASPIR: CPT | Mod: GC

## 2020-07-14 PROCEDURE — 88305 TISSUE EXAM BY PATHOLOGIST: CPT | Mod: 26

## 2020-07-14 RX ORDER — SODIUM CHLORIDE 9 MG/ML
1000 INJECTION, SOLUTION INTRAVENOUS
Refills: 0 | Status: DISCONTINUED | OUTPATIENT
Start: 2020-07-14 | End: 2020-07-29

## 2020-07-14 RX ADMIN — SODIUM CHLORIDE 30 MILLILITER(S): 9 INJECTION, SOLUTION INTRAVENOUS at 14:13

## 2020-07-19 ENCOUNTER — OUTPATIENT (OUTPATIENT)
Dept: OUTPATIENT SERVICES | Facility: HOSPITAL | Age: 70
LOS: 1 days | End: 2020-07-19
Payer: MEDICARE

## 2020-07-19 ENCOUNTER — APPOINTMENT (OUTPATIENT)
Dept: CT IMAGING | Facility: IMAGING CENTER | Age: 70
End: 2020-07-19
Payer: MEDICARE

## 2020-07-19 DIAGNOSIS — C16.9 MALIGNANT NEOPLASM OF STOMACH, UNSPECIFIED: ICD-10-CM

## 2020-07-19 DIAGNOSIS — Z98.890 OTHER SPECIFIED POSTPROCEDURAL STATES: Chronic | ICD-10-CM

## 2020-07-19 DIAGNOSIS — Z90.89 ACQUIRED ABSENCE OF OTHER ORGANS: Chronic | ICD-10-CM

## 2020-07-19 DIAGNOSIS — Z90.3 ACQUIRED ABSENCE OF STOMACH [PART OF]: Chronic | ICD-10-CM

## 2020-07-19 PROCEDURE — 71260 CT THORAX DX C+: CPT

## 2020-07-19 PROCEDURE — 74177 CT ABD & PELVIS W/CONTRAST: CPT | Mod: 26

## 2020-07-19 PROCEDURE — 74177 CT ABD & PELVIS W/CONTRAST: CPT

## 2020-07-19 PROCEDURE — 71260 CT THORAX DX C+: CPT | Mod: 26

## 2020-07-19 PROCEDURE — 82565 ASSAY OF CREATININE: CPT

## 2020-07-21 ENCOUNTER — APPOINTMENT (OUTPATIENT)
Dept: SURGICAL ONCOLOGY | Facility: CLINIC | Age: 70
End: 2020-07-21
Payer: MEDICARE

## 2020-07-21 VITALS
DIASTOLIC BLOOD PRESSURE: 66 MMHG | HEIGHT: 67 IN | BODY MASS INDEX: 26.06 KG/M2 | OXYGEN SATURATION: 98 % | HEART RATE: 61 BPM | RESPIRATION RATE: 15 BRPM | WEIGHT: 166 LBS | SYSTOLIC BLOOD PRESSURE: 120 MMHG

## 2020-07-21 DIAGNOSIS — K21.9 GASTRO-ESOPHAGEAL REFLUX DISEASE W/OUT ESOPHAGITIS: ICD-10-CM

## 2020-07-21 PROCEDURE — 99215 OFFICE O/P EST HI 40 MIN: CPT

## 2020-07-22 PROBLEM — K21.9 GERD (GASTROESOPHAGEAL REFLUX DISEASE): Status: ACTIVE | Noted: 2020-07-06

## 2020-07-22 NOTE — REASON FOR VISIT
[Follow-Up Visit] : a follow-up visit for [Other: _____] : [unfilled] [FreeTextEntry2] : recurrent gastric cancer

## 2020-07-22 NOTE — ASSESSMENT
[FreeTextEntry1] : Recurrent, likely metastatic gastric cancer.\par Recurrent lesion appears resectable, but will require completion gastrectomy and left hepatic lobectomy.  However, surgical resection is contraindicated in the presence of peritoneal carcinomatosis.\par Left peritoneal nodularity is more prominent compared to prior imaging suggesting carcinomatosis.\par Will repeat PET/CT for evaluation and possible ultrasound FNA to establish diagnosis.\par Discussed with the patient, his son and daughter in detail.  All questions answered.

## 2020-07-22 NOTE — HISTORY OF PRESENT ILLNESS
[de-identified] : 67 year-old Mauritian-speaking male presents follow up. He was s/p x-lap, proximal gastrectomy, feeding jejunostomy tube placement, umbilical hernia repair and liver biopsy on 9/5/17. 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 Resection margins and liver tissue were negative. He did experience rapid A-fib in the postop setting which was managed to resolution by cardiology.\par \par He was initially having symptoms of bile reflux for which he was prescribed Carafate which seems to have helped, however, he began to develop symptoms of post prandial vomiting and was ultimately found to have a stricture at the GEJ. He has been undergoing interval dilations under the care of Dr. Rina Chopra (most recently on 1/16/18). His symptoms do improve post dilation, however, tend to re occur given that he is also receiving adjuvant chemotherapy under the care of Dr. Prince Lemus. Chemotherapy is scheduled to conclude in early February 2018, after which he will consult with radiation oncology to determine potential role of RT.\par \par He also presented to the ED on 1/18/18 with complaints of vague dizziness. Work up was negative and he was hydrated and discharged home. \par \par He also completed a CT of the abdomen and pelvis on 1/11/18 which showed no evidence of recurrent or metastatic disease. There is a small amount of pelvic ascites fluid for which continued follow up is recommended.\par \par Today he is feeling well. He is tolerating diet generally well but does experience vomiting following chemotherapy. He denies abdominal pain or fever and his weight remains stable. He also reports recurrence of his umbilical hernia. \par \par Previous pertinent history is as follows:\par \par He was initially seen on 6/5/17 having been referred by Dr. Lyle Rodriguez. [place] endorsed 30 lb unintentional weight loss over the past 6 months, as well a few episodes of black stools, decreased appetite, early satiety, abdominal discomfort and bloating. \par \par On 5/21/17 Dr. Rodriguez performed an EGD which revealed a large ulcerated mass in the fundus along the greater curvature and extending to the cardia. Biopsy at the fundus was positive for poorly differentiated and ulcerated adenocarcinoma, H. pylori negative. On 5/22/17 he completed a CT of the abdomen and pelvis which revealed a 1.6 cm lesion in the left hepatic dome, which was likely present on a prior chest CT performed in September 2015. There is mild wall thickening and enhancement of the lesser curvature of the gastric fundus with central ulcerations consistent with endoscopic findings, as well as shotty lymph nodes along the gastrophepatic ligament, and trace fluid in the pelvis. \par \par He completed a PET/CT on 6/1/17 which showed hypermetabolism associated with the primary mass but otherwise no evidence of yeny or distant mets.\par \par On 6/2/17 Dr. Montoya performed an EUS which demonstrated a large, fungating mass in the cardia extending to the lesser curvature with poorly defined borders. The distal aspect of the mass was tattooed. In addition, there were several small lymph nodes measuring up to 0.6 cm noted along the gastrohepatic ligament. The lesion was staged T3N2 by EUS criteria.\par \par He completed neoadjuvant chemotherapy on 7/21/17 under the care of Dr. Chen. His local oncologist Dr. Prince Lemus referred him for re staging CT of the abdomen and pelvis on 7/27/17, which showed no evidence of developing metastatic disease int he abdomen and pelvis. There is no residual gastric wall thickening or focal lesion. There is a tiny lesion in the left hepatic lobe stable from 6/4/14 favoring benign etiology. There are new lung nodules from prior exam for which infectious/inflammatory etiology is suggested and short term follow up chest CT is recommended. \par \par His PMH is otherwise notable for a cardiac arrythymia (? A-fib- currently off anticoagulation- recently s/p MICHAEL closure on 6/14/17), HTN, HLD, DM II (on oral medication), GERD, thyroid nodules, and COPD (well-controlled). He has no prior surgical history and no family history of malignancies. \par \par GI/Referring MD: Dr. Lyle Rodriguez/ Dr. Erasmo Montoya/ Dr. Rina Chopra\par PCP: Dr. Jessica Fried\[facility] Cardiologist: Dr. Yusuf Rae ([facility])/Dr. Marie\par Med Onc: Dr. Prince Lemus\par \par Emiliana Wilkerson (daughter) [phone]\par Saulo (son) [phone]. \par ----------------------------------------------------------------------------------------------------------------\par 07/21/2020: Patient returns for follow up after he underwent MRI abdomen for increasing tumor marker.  He also reports worsening reflux symptoms.  MRI 07/01/2020 shows a 4 cm mass involving the gastric remnant and left liver lobe.  He underwent an EUS/FNA by Dr. Montoya 07/14/2020.  FNA results showed metastatic adenocarcinoma, consistent with gastric origin.\par A restaging CT chest/abdomen/pelvis 07/19/2020 re-demonstrated recurrent tumor, but also showed possible development of peritoneal disease.

## 2020-07-27 ENCOUNTER — APPOINTMENT (OUTPATIENT)
Dept: NUCLEAR MEDICINE | Facility: IMAGING CENTER | Age: 70
End: 2020-07-27

## 2020-07-27 ENCOUNTER — OUTPATIENT (OUTPATIENT)
Dept: OUTPATIENT SERVICES | Facility: HOSPITAL | Age: 70
LOS: 1 days | End: 2020-07-27
Payer: MEDICARE

## 2020-07-27 DIAGNOSIS — Z98.890 OTHER SPECIFIED POSTPROCEDURAL STATES: Chronic | ICD-10-CM

## 2020-07-27 DIAGNOSIS — Z90.3 ACQUIRED ABSENCE OF STOMACH [PART OF]: Chronic | ICD-10-CM

## 2020-07-27 DIAGNOSIS — Z90.89 ACQUIRED ABSENCE OF OTHER ORGANS: Chronic | ICD-10-CM

## 2020-07-27 DIAGNOSIS — C16.9 MALIGNANT NEOPLASM OF STOMACH, UNSPECIFIED: ICD-10-CM

## 2020-07-27 PROCEDURE — 78815 PET IMAGE W/CT SKULL-THIGH: CPT | Mod: 26,PS

## 2020-07-27 PROCEDURE — 78815 PET IMAGE W/CT SKULL-THIGH: CPT

## 2020-07-27 PROCEDURE — A9552: CPT

## 2020-09-19 NOTE — DISCHARGE NOTE ADULT - ABILITY TO HEAR (WITH HEARING AID OR HEARING APPLIANCE IF NORMALLY USED):
Adequate: hears normal conversation without difficulty Attending Attestation (For Attendings USE Only)...

## 2020-11-23 ENCOUNTER — RX RENEWAL (OUTPATIENT)
Age: 70
End: 2020-11-23

## 2020-11-23 RX ORDER — PANTOPRAZOLE 40 MG/1
40 TABLET, DELAYED RELEASE ORAL DAILY
Qty: 30 | Refills: 2 | Status: ACTIVE | COMMUNITY
Start: 2020-07-21 | End: 1900-01-01

## 2020-12-21 ENCOUNTER — APPOINTMENT (OUTPATIENT)
Dept: SURGICAL ONCOLOGY | Facility: CLINIC | Age: 70
End: 2020-12-21

## 2021-01-18 ENCOUNTER — APPOINTMENT (OUTPATIENT)
Dept: SURGICAL ONCOLOGY | Facility: CLINIC | Age: 71
End: 2021-01-18

## 2021-01-18 DIAGNOSIS — C16.9 MALIGNANT NEOPLASM OF STOMACH, UNSPECIFIED: ICD-10-CM

## 2021-01-18 NOTE — CONSULT LETTER
[Dear  ___] : Dear  [unfilled], [Consult Letter:] : I had the pleasure of evaluating your patient, [unfilled]. [Consult Closing:] : Thank you very much for allowing me to participate in the care of this patient.  If you have any questions, please do not hesitate to contact me. [Sincerely,] : Sincerely, [DrRomel  ___] : Dr. GUERIN [DrRomel ___] : Dr. GUERIN [___] : [unfilled] [FreeTextEntry2] : Lyle Rodriguez MD [FreeTextEntry1] : A&P:\par - Recurrent metastatic gastric cancer\par \par GI/Referring MD: Dr. Lyle Rodriguez/ Dr. Erasmo Montoya/ Dr. Rina Chopra\par PCP: Dr. Jessica Fried\par Cardiologist: Dr. Yusuf Rae ([facility])/Dr. Marie\par Med Onc: Dr. Prince Lemus\par  [FreeTextEntry3] : Max Martinez MD, FACS, FASCRS\par , Department of Surgery\par Director of the [facility] [place]\par , Minimally Invasive/Robotic Cancer Surgery, Central & [facility] Divisions\par Division of Surgical Oncology

## 2021-01-18 NOTE — ASSESSMENT
[FreeTextEntry1] : 70 year-old Thai-speaking male presents follow up. He was s/p x-lap, proximal gastrectomy, feeding jejunostomy tube placement, umbilical hernia repair and liver biopsy on 9/5/17. 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 Resection margins and liver tissue were negative. He did experience rapid A-fib in the postop setting which was managed to resolution by cardiology.\par \par  Patient was seen by Dr. Pruitt on 7/21/2020 after he underwent MRI abdomen for increasing tumor marker.  He also reports worsening reflux symptoms.  MRI 07/01/2020 shows a 4 cm mass involving the gastric remnant and left liver lobe.  He underwent an EUS/FNA by Dr. Montoya 07/14/2020.  FNA results showed metastatic adenocarcinoma, consistent with gastric origin.\par A restaging CT chest/abdomen/pelvis 07/19/2020 re-demonstrated recurrent tumor, but also showed possible development of peritoneal disease.\par \par PET/CT 7/27/20: \par 1. FDG avid soft tissue mass adjacent to the gastric remnant and inseparable from the adjacent liver, slightly more extensive than on PET/CT from 4/7/2020 with additional new FDG avid nodule at the level of the gastric pylorus. These is suspicious for recurrent disease.\par 2. Nonspecific mild hypermetabolism in the bilateral perihilar regions without CT correlate, new from prior study, may represent small lymph nodes.\par 3. Nonspecific mild hypermetabolism in the distal esophagus without CT correlate.\par 4. FDG avid periumbilical soft tissue density. This may represent postsurgical change. Please correlate clinically or with follow-up study to assess for resolution.\par 5. FDG avid soft tissue at the L4 spinous process, more prominent, probably posttraumatic. Please correlate with history of trauma and with physical examination.\par \par Chemo with Dr. Prince Lemus?????\par \par PLAN:\par \par

## 2021-01-18 NOTE — PHYSICAL EXAM
[Normal] : supple, no neck mass and thyroid not enlarged [Normal Neck Lymph Nodes] : normal neck lymph nodes  [Normal Supraclavicular Lymph Nodes] : normal supraclavicular lymph nodes [Normal] : oriented to person, place and time, with appropriate affect [de-identified] : vertical midline incision healing well with no evidence of infection.  Tiny reducible umbilical hernia.

## 2021-01-18 NOTE — HISTORY OF PRESENT ILLNESS
[de-identified] : 70 year-old Bahraini-speaking male presents follow up. He was s/p x-lap, proximal gastrectomy, feeding jejunostomy tube placement, umbilical hernia repair and liver biopsy on 9/5/17. 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 Resection margins and liver tissue were negative. He did experience rapid A-fib in the postop setting which was managed to resolution by cardiology.\par \par Patient was seen by Dr. Pruitt on 7/21/2020 after he underwent MRI abdomen for increasing tumor marker.  He also reports worsening reflux symptoms.  MRI 07/01/2020 shows a 4 cm mass involving the gastric remnant and left liver lobe.  A few peritoneal nodules are seen in the left upper quadrant, not seen on prior CT and likely representing peritoneal implants.  He underwent an EUS/FNA by Dr. Montoya 07/14/2020.  A 4 cm perigastric/hepatic hypoechoic mass was seen.  FNA results showed metastatic adenocarcinoma, consistent with gastric origin.\par \par A restaging CT chest/abdomen/pelvis 07/19/2020 re-demonstrated recurrent tumor, but also showed possible development of peritoneal disease.\par \par PET/CT 7/27/20: \par 1. FDG avid soft tissue mass adjacent to the gastric remnant and inseparable from the adjacent liver, slightly more extensive than on PET/CT from 4/7/2020 with additional new FDG avid nodule at the level of the gastric pylorus. These is suspicious for recurrent disease.\par 2. Nonspecific mild hypermetabolism in the bilateral perihilar regions without CT correlate, new from prior study, may represent small lymph nodes.\par 3. Nonspecific mild hypermetabolism in the distal esophagus without CT correlate.\par 4. FDG avid periumbilical soft tissue density. This may represent postsurgical change. Please correlate clinically or with follow-up study to assess for resolution.\par 5. FDG avid soft tissue at the L4 spinous process, more prominent, probably posttraumatic. Please correlate with history of trauma and with physical examination.\par \par Chemo with Dr. Prince Lemsu?????\par \par Previous pertinent history is as follows:\par \par He was initially seen on 6/5/17 having been referred by Dr. Lyle Rodriguez. [place] endorsed 30 lb unintentional weight loss over the past 6 months, as well a few episodes of black stools, decreased appetite, early satiety, abdominal discomfort and bloating. \par \par On 5/21/17 Dr. Rodriguez performed an EGD which revealed a large ulcerated mass in the fundus along the greater curvature and extending to the cardia. Biopsy at the fundus was positive for poorly differentiated and ulcerated adenocarcinoma, H. pylori negative. On 5/22/17 he completed a CT of the abdomen and pelvis which revealed a 1.6 cm lesion in the left hepatic dome, which was likely present on a prior chest CT performed in September 2015. There is mild wall thickening and enhancement of the lesser curvature of the gastric fundus with central ulcerations consistent with endoscopic findings, as well as shotty lymph nodes along the gastrophepatic ligament, and trace fluid in the pelvis. \par \par He completed a PET/CT on 6/1/17 which showed hypermetabolism associated with the primary mass but otherwise no evidence of yeny or distant mets.\par \par On 6/2/17 Dr. Montoya performed an EUS which demonstrated a large, fungating mass in the cardia extending to the lesser curvature with poorly defined borders. The distal aspect of the mass was tattooed. In addition, there were several small lymph nodes measuring up to 0.6 cm noted along the gastrohepatic ligament. The lesion was staged T3N2 by EUS criteria.\par \par He completed neoadjuvant chemotherapy on 7/21/17 under the care of Dr. Chen. His local oncologist Dr. Prince Lemus referred him for re staging CT of the abdomen and pelvis on 7/27/17, which showed no evidence of developing metastatic disease int he abdomen and pelvis. There is no residual gastric wall thickening or focal lesion. There is a tiny lesion in the left hepatic lobe stable from 6/4/14 favoring benign etiology. There are new lung nodules from prior exam for which infectious/inflammatory etiology is suggested and short term follow up chest CT is recommended. \par \par His PMH is otherwise notable for a cardiac arrythymia (? A-fib- currently off anticoagulation- recently s/p MICHAEL closure on 6/14/17), HTN, HLD, DM II (on oral medication), GERD, thyroid nodules, and COPD (well-controlled). He has no prior surgical history and no family history of malignancies. \par \par He was initially having symptoms of bile reflux for which he was prescribed Carafate which seems to have helped, however, he began to develop symptoms of post prandial vomiting and was ultimately found to have a stricture at the GEJ. He has been undergoing interval dilations under the care of Dr. Rina Chopra (most recently on 1/16/18). His symptoms do improve post dilation, however, tend to re occur given that he is also receiving adjuvant chemotherapy under the care of Dr. Prince Lemus. Chemotherapy is scheduled to conclude in early February 2018, after which he will consult with radiation oncology to determine potential role of RT.\par \par He also presented to the ED on 1/18/18 with complaints of vague dizziness. Work up was negative and he was hydrated and discharged home. \par \par He also completed a CT of the abdomen and pelvis on 1/11/18 which showed no evidence of recurrent or metastatic disease. There is a small amount of pelvic ascites fluid for which continued follow up is recommended.\par \par GI/Referring MD: Dr. Lyle Rodriguez/ Dr. Erasmo Montoya/ Dr. Rina Chopra\par PCP: Dr. Jessica Fried\par Cardiologist: Dr. Yusuf Rae ([facility])/Dr. Marie\par Med Onc: Dr. Prince Lemus\par \par Emiliana Wilkerson (daughter) [phone]\par Saulo (son) [phone]. \par ----------------------------------------------------------------------------------------------------------------\par

## 2021-03-29 NOTE — ED PROVIDER NOTE - RATE
130 Dorsal Nasal Flap Text: The defect edges were debeveled with a #15 scalpel blade.  Given the location of the defect and the proximity to free margins a dorsal nasal flap was deemed most appropriate.  Using a sterile surgical marker, an appropriate dorsal nasal flap was drawn around the defect.    The area thus outlined was incised deep to adipose tissue with a #15 scalpel blade.  The skin margins were undermined to an appropriate distance in all directions utilizing iris scissors.

## 2021-06-10 NOTE — ED PROVIDER NOTE - CONSTITUTIONAL, MLM
Increase Amitiza to 24 mcg twice a day.    Please call the office with questions or concerns, [phone].    Follow up in 2-3 months or sooner if needed.   
- - -

## 2021-09-28 NOTE — ED PROVIDER NOTE - CROS ED ROS STATEMENT
Reason for Disposition  • [1] COVID-19 infection suspected by caller or triager AND [2] mild symptoms (cough, fever, or others) AND [3] no complications or SOB    Protocols used: CORONAVIRUS (COVID-19) DIAGNOSED OR [id number]     all other ROS negative except as per HPI

## 2022-04-15 NOTE — PROVIDER CONTACT NOTE (OTHER) - SITUATION
Patient's anion gap closed. Was able to tolerate jello/fluids without nausea/vomiting. Orders received for consistent carb diet and lantus. Insulin gtt can be discontinued in 2 hours if patient is able to tolerate after lantus is given. Spoke with Dr. Fairchild. Orders received for sliding scale insulin with meals and to DC fluids when insulin gtt is stopped. Patient had a full meal this evening without difficulty. Will continue to monitor.    Pt was complaining of nausea

## 2022-05-25 NOTE — DISCHARGE NOTE ADULT - NS MD DC PLAN IMMU FLU PROVIDE INFO
Patient is in the lateral left side position.   
Specimens verified per policy.  
Risks/benefits discussed with patient or patient surrogate

## 2023-04-10 NOTE — ASU PATIENT PROFILE, ADULT - DOES PATIENT HAVE ADVANCE DIRECTIVE
Incubation End Time: 3:30 pm Show Medical Necessity In Plan?: Yes Debridement Text (Will Only Render In Visit Note If You Select Debridement Option Under Who Performed The Pdt Field): Prior to application of the photodynamic medication the hyperkeratotic lesions were curetted to make them more amenable to therapy. Anesthesia Type: 1% lidocaine with epinephrine Treatment Number: 2 Lot # (Optional): [id number] Show Inventory Tab: Hide Pre-Procedure Text: The treatment areas were cleaned and prepped in the usual fashion. Was Levulan/Ameluz Applied On A Previous Day?: No Medical Necessity: Precancerous Lesions Expiration Date (Optional): 2026- 06 Illumination Time: 00:16:40 Detail Level: Zone Incubation Time: 01:00:00 Light Source: Anmol-U Ndc# (Optional): 48045-308-84 Who Performed The Pdt (Provider): Louis Craig Consent: Written consent obtained. The risks were reviewed with the patient including but not limited to: pigmentary changes, pain, blistering, scabbing, redness, and the remote possibility of scarring. Who Performed The Pdt?: Performed by MD LEIDY, YVES or DANII ([id number]) Number Of Kerasticks/Tubes Billed For: 1 Which Photosensitizer Was Used: Levulan Post-Care Instructions: I reviewed with the patient in detail post-care instructions. Patient is to avoid sunlight for the next 2 days, and wear sun protection. Patients may expect sunburn like redness, discomfort and scabbing. Total Number Of Aks Treated (Optional To Report): 0 Incubation Start Time: 2:30 pm No/pt given HCP form pt given HCP form/Yes

## 2023-04-13 NOTE — DISCHARGE NOTE ADULT - PATIENT PORTAL LINK FT
“You can access the [facility] Patient Portal, offered by [facility], by registering with the following website: [email or web address]”
4 = No assist / stand by assistance

## 2024-04-07 NOTE — ED ADULT NURSE NOTE - PAIN: PRESENCE, MLM
Bed/Stretcher in lowest position, wheels locked, appropriate side rails in place/Call bell, personal items and telephone in reach/Instruct patient to call for assistance before getting out of bed/chair/stretcher/Non-slip footwear applied when patient is off stretcher/[place] to call system/Physically safe environment - no spills, clutter or unnecessary equipment/Purposeful proactive rounding/Room/bathroom lighting operational, light cord in reach
denies pain/discomfort

## 2024-05-10 NOTE — PHYSICAL THERAPY INITIAL EVALUATION ADULT - PATIENT/FAMILY AGREES WITH PLAN
Pt notes that he had some discomfort with urination on Monday night and did not say much about it to wife and noted yesterday ws passing some blood in his urine that was more of a dark red color without clots. Had another episode yesterday and nothing this morning but again started this afternoon. No fever noted per wife and is not on any blood thinners except for baby aspirin.Is not eating any foods with a lot of red food dyes  Denies any flank pain but thinks he is uncomfortable passing urine. Hard to tell her wife due to early onset dementia. Is not having difficulty with urine flow. Pts wife can not leave to bring him due to at work. Suggested she bring him to the  after work. Hours of operation given. She will comply   yes

## 2024-09-04 NOTE — PATIENT PROFILE ADULT. - NS PRO PT REFERRAL QUES 2 YN
Wendy from [facility] pharmacy in [place] calling to clarify insulin dosage.    Confirmed with MYESHA Nielsen that patient needs to be on 26 units since had been taking 24 units.  
no

## 2025-05-17 NOTE — ED PROVIDER NOTE - CONSTITUTIONAL, MLM
- - - I have re-evaluated the patient's fluid status and reviewed vital signs. Clinical perfusion assessment was performed.